# Patient Record
Sex: MALE | Race: WHITE | NOT HISPANIC OR LATINO | Employment: OTHER | URBAN - METROPOLITAN AREA
[De-identification: names, ages, dates, MRNs, and addresses within clinical notes are randomized per-mention and may not be internally consistent; named-entity substitution may affect disease eponyms.]

---

## 2019-01-10 PROBLEM — Z98.890 S/P COLONOSCOPIC POLYPECTOMY: Chronic | Status: ACTIVE | Noted: 2019-01-10

## 2020-02-19 PROBLEM — E11.9 NON-INSULIN DEPENDENT TYPE 2 DIABETES MELLITUS: Chronic | Status: ACTIVE | Noted: 2020-02-19

## 2020-03-03 ENCOUNTER — TELEPHONE (OUTPATIENT)
Dept: DIABETES | Facility: CLINIC | Age: 75
End: 2020-03-03

## 2020-03-03 NOTE — TELEPHONE ENCOUNTER
Spoke with patients wife and informed her that a diabetes referral coordinator should be contacting her to schedule an appointment with Janette Huerta, the DM Educator.

## 2020-03-03 NOTE — TELEPHONE ENCOUNTER
----- Message from Randall Langston sent at 3/2/2020 12:33 PM CST -----  Contact: pt: [phone]  Pt would like to speak with staff to receive information on the diabetes education class      Please contact pt: [phone]

## 2020-11-24 PROBLEM — Z01.818 PREOP TESTING: Status: ACTIVE | Noted: 2020-11-24

## 2020-11-24 PROBLEM — Z85.038 HISTORY OF COLON CANCER: Status: ACTIVE | Noted: 2020-11-24

## 2021-01-09 ENCOUNTER — IMMUNIZATION (OUTPATIENT)
Dept: PRIMARY CARE CLINIC | Facility: CLINIC | Age: 76
End: 2021-01-09
Payer: MEDICARE

## 2021-01-09 DIAGNOSIS — Z23 NEED FOR VACCINATION: ICD-10-CM

## 2021-01-09 PROCEDURE — 91300 COVID-19, MRNA, LNP-S, PF, 30 MCG/0.3 ML DOSE VACCINE: CPT | Mod: PBBFAC | Performed by: FAMILY MEDICINE

## 2021-01-30 ENCOUNTER — IMMUNIZATION (OUTPATIENT)
Dept: PRIMARY CARE CLINIC | Facility: CLINIC | Age: 76
End: 2021-01-30
Payer: MEDICARE

## 2021-01-30 DIAGNOSIS — Z23 NEED FOR VACCINATION: Primary | ICD-10-CM

## 2021-01-30 PROCEDURE — 91300 COVID-19, MRNA, LNP-S, PF, 30 MCG/0.3 ML DOSE VACCINE: CPT | Mod: PBBFAC | Performed by: EMERGENCY MEDICINE

## 2021-01-30 PROCEDURE — 0002A COVID-19, MRNA, LNP-S, PF, 30 MCG/0.3 ML DOSE VACCINE: CPT | Mod: PBBFAC | Performed by: EMERGENCY MEDICINE

## 2021-02-15 PROBLEM — N18.30 STAGE 3 CHRONIC KIDNEY DISEASE: Status: ACTIVE | Noted: 2021-02-15

## 2021-02-22 ENCOUNTER — TELEPHONE (OUTPATIENT)
Dept: PODIATRY | Facility: CLINIC | Age: 76
End: 2021-02-22

## 2021-10-22 ENCOUNTER — IMMUNIZATION (OUTPATIENT)
Dept: PRIMARY CARE CLINIC | Facility: CLINIC | Age: 76
End: 2021-10-22
Payer: MEDICARE

## 2021-10-22 DIAGNOSIS — Z23 NEED FOR VACCINATION: Primary | ICD-10-CM

## 2021-10-22 PROCEDURE — 0003A COVID-19, MRNA, LNP-S, PF, 30 MCG/0.3 ML DOSE VACCINE: CPT | Mod: PBBFAC | Performed by: EMERGENCY MEDICINE

## 2021-10-22 PROCEDURE — 91300 COVID-19, MRNA, LNP-S, PF, 30 MCG/0.3 ML DOSE VACCINE: CPT | Mod: PBBFAC | Performed by: EMERGENCY MEDICINE

## 2022-02-17 ENCOUNTER — TELEPHONE (OUTPATIENT)
Dept: ORTHOPEDICS | Facility: CLINIC | Age: 77
End: 2022-02-17
Payer: MEDICARE

## 2022-02-17 DIAGNOSIS — M79.642 BILATERAL HAND PAIN: Primary | ICD-10-CM

## 2022-02-17 DIAGNOSIS — M79.641 BILATERAL HAND PAIN: Primary | ICD-10-CM

## 2022-02-17 NOTE — TELEPHONE ENCOUNTER
Spoke with pt. Advised pt his appt would need to be rescheduled due to Dr Edward being out of clinic. appt rescheduled with PA. Advised pt that he will need xrays prior to his appt. Images ordered and scheduled. All questions answered. Pt verbalized understanding.

## 2022-02-17 NOTE — TELEPHONE ENCOUNTER
----- Message from Mya Chao sent at 2/17/2022 10:20 AM CST -----  Contact: pt  Pt returning call to [facility].    Confirmed contact below:  Contact Name:Nael Mistry  Phone Number: [phone]

## 2022-02-22 ENCOUNTER — OFFICE VISIT (OUTPATIENT)
Dept: ORTHOPEDICS | Facility: CLINIC | Age: 77
End: 2022-02-22
Payer: MEDICARE

## 2022-02-22 VITALS
SYSTOLIC BLOOD PRESSURE: 144 MMHG | RESPIRATION RATE: 18 BRPM | HEART RATE: 75 BPM | WEIGHT: 251.63 LBS | BODY MASS INDEX: 36.02 KG/M2 | HEIGHT: 70 IN | DIASTOLIC BLOOD PRESSURE: 83 MMHG

## 2022-02-22 DIAGNOSIS — M79.642 BILATERAL HAND PAIN: ICD-10-CM

## 2022-02-22 DIAGNOSIS — R20.0 FINGER NUMBNESS: Primary | ICD-10-CM

## 2022-02-22 DIAGNOSIS — M79.641 BILATERAL HAND PAIN: ICD-10-CM

## 2022-02-22 PROCEDURE — 3008F PR BODY MASS INDEX (BMI) DOCUMENTED: ICD-10-PCS | Mod: CPTII,S$GLB,, | Performed by: PHYSICIAN ASSISTANT

## 2022-02-22 PROCEDURE — 3077F SYST BP >= 140 MM HG: CPT | Mod: CPTII,S$GLB,, | Performed by: PHYSICIAN ASSISTANT

## 2022-02-22 PROCEDURE — 1101F PT FALLS ASSESS-DOCD LE1/YR: CPT | Mod: CPTII,S$GLB,, | Performed by: PHYSICIAN ASSISTANT

## 2022-02-22 PROCEDURE — 1125F AMNT PAIN NOTED PAIN PRSNT: CPT | Mod: CPTII,S$GLB,, | Performed by: PHYSICIAN ASSISTANT

## 2022-02-22 PROCEDURE — 3288F FALL RISK ASSESSMENT DOCD: CPT | Mod: CPTII,S$GLB,, | Performed by: PHYSICIAN ASSISTANT

## 2022-02-22 PROCEDURE — 3044F HG A1C LEVEL LT 7.0%: CPT | Mod: CPTII,S$GLB,, | Performed by: PHYSICIAN ASSISTANT

## 2022-02-22 PROCEDURE — 3079F PR MOST RECENT DIASTOLIC BLOOD PRESSURE 80-89 MM HG: ICD-10-PCS | Mod: CPTII,S$GLB,, | Performed by: PHYSICIAN ASSISTANT

## 2022-02-22 PROCEDURE — 1160F PR REVIEW ALL MEDS BY PRESCRIBER/CLIN PHARMACIST DOCUMENTED: ICD-10-PCS | Mod: CPTII,S$GLB,, | Performed by: PHYSICIAN ASSISTANT

## 2022-02-22 PROCEDURE — 1160F RVW MEDS BY RX/DR IN RCRD: CPT | Mod: CPTII,S$GLB,, | Performed by: PHYSICIAN ASSISTANT

## 2022-02-22 PROCEDURE — 1159F MED LIST DOCD IN RCRD: CPT | Mod: CPTII,S$GLB,, | Performed by: PHYSICIAN ASSISTANT

## 2022-02-22 PROCEDURE — 3044F PR MOST RECENT HEMOGLOBIN A1C LEVEL <7.0%: ICD-10-PCS | Mod: CPTII,S$GLB,, | Performed by: PHYSICIAN ASSISTANT

## 2022-02-22 PROCEDURE — 3288F PR FALLS RISK ASSESSMENT DOCUMENTED: ICD-10-PCS | Mod: CPTII,S$GLB,, | Performed by: PHYSICIAN ASSISTANT

## 2022-02-22 PROCEDURE — 99999 PR PBB SHADOW E&M-EST. PATIENT-LVL V: ICD-10-PCS | Mod: PBBFAC,,, | Performed by: PHYSICIAN ASSISTANT

## 2022-02-22 PROCEDURE — 99203 PR OFFICE/OUTPT VISIT, NEW, LEVL III, 30-44 MIN: ICD-10-PCS | Mod: S$GLB,,, | Performed by: PHYSICIAN ASSISTANT

## 2022-02-22 PROCEDURE — 3008F BODY MASS INDEX DOCD: CPT | Mod: CPTII,S$GLB,, | Performed by: PHYSICIAN ASSISTANT

## 2022-02-22 PROCEDURE — 1159F PR MEDICATION LIST DOCUMENTED IN MEDICAL RECORD: ICD-10-PCS | Mod: CPTII,S$GLB,, | Performed by: PHYSICIAN ASSISTANT

## 2022-02-22 PROCEDURE — 1125F PR PAIN SEVERITY QUANTIFIED, PAIN PRESENT: ICD-10-PCS | Mod: CPTII,S$GLB,, | Performed by: PHYSICIAN ASSISTANT

## 2022-02-22 PROCEDURE — 99999 PR PBB SHADOW E&M-EST. PATIENT-LVL V: CPT | Mod: PBBFAC,,, | Performed by: PHYSICIAN ASSISTANT

## 2022-02-22 PROCEDURE — 1101F PR PT FALLS ASSESS DOC 0-1 FALLS W/OUT INJ PAST YR: ICD-10-PCS | Mod: CPTII,S$GLB,, | Performed by: PHYSICIAN ASSISTANT

## 2022-02-22 PROCEDURE — 99203 OFFICE O/P NEW LOW 30 MIN: CPT | Mod: S$GLB,,, | Performed by: PHYSICIAN ASSISTANT

## 2022-02-22 PROCEDURE — 3079F DIAST BP 80-89 MM HG: CPT | Mod: CPTII,S$GLB,, | Performed by: PHYSICIAN ASSISTANT

## 2022-02-22 PROCEDURE — 3077F PR MOST RECENT SYSTOLIC BLOOD PRESSURE >= 140 MM HG: ICD-10-PCS | Mod: CPTII,S$GLB,, | Performed by: PHYSICIAN ASSISTANT

## 2022-02-22 NOTE — PROGRESS NOTES
Subjective:      Patient ID: Nael Mistry is a 76 y.o. male.    Chief Complaint: Pain and Numbness of the Left Hand and Pain and Numbness of the Right Hand    77 yo RHD M presents to clinic with c/o intermittent bilateral wrist pain and numbness x years.  Patient denies any trauma or injury   Patient states the pain and numbness are worse at night, he is having difficulty sleeping and is waking up in the middle of the night with pain.  Patient is right-handed, and states his right wrist is slightly worse than his left.  States numbness and tingling radiates to the elbow at times.  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  Patient has been taking meloxicam once daily as prescribed by PCP, with minimal improvement in his symptoms.    Pt states that he read an article about carpal tunnel written by Dr. Moralez in a magazine that he received in the mail.  He has similar sx to what was mentioned in the article, so he thinks that he has carpal tunnel.      Review of Systems   Constitutional: Negative for chills and fever.   HENT: Negative for ear pain.    Eyes: Negative for pain.   Cardiovascular: Negative for chest pain.   Respiratory: Negative for cough and shortness of breath.    Skin: Negative for color change and rash.   Musculoskeletal: Positive for joint pain (bilateral wrists). Negative for back pain, joint swelling and neck pain.   Gastrointestinal: Negative for abdominal pain.   Neurological: Positive for numbness (bilateral hands) and paresthesias (bilateral hands).         Objective:            General    Constitutional: He is oriented to person, place, and time. He appears well-developed and well-nourished. No distress.   HENT:   Head: Normocephalic and atraumatic.   Eyes: EOM are normal.   Neck: Neck supple.   Cardiovascular: Normal rate.    Pulmonary/Chest: Effort normal. No respiratory distress.   Neurological: He is alert and oriented to person, place, and time.    Psychiatric: He has a normal mood and affect. His behavior is normal. Judgment and thought content normal.             Right Hand/Wrist Exam     Inspection   Scars: Wrist - absent Hand -  absent  Effusion: Wrist - absent Hand -  absent  Bruising: Wrist - absent Hand -  absent  Deformity: Wrist - deformity Hand -  deformity    Range of Motion     Wrist   Extension: normal   Flexion: normal     Tests   Phalens Sign: negative  Tinel's sign (median nerve): negative  Finkelstein's test: negative  Carpal Tunnel Compression Test: negative  Cubital Tunnel Compression Test: negative      Other     Neuorologic Exam    Median Distribution: normal  Ulnar Distribution: normal  Radial Distribution: normal      Left Hand/Wrist Exam     Inspection   Scars: Wrist - absent Hand -  absent  Effusion: Wrist - absent Hand -  absent  Bruising: Wrist - absent Hand -  absent  Deformity: Wrist - absent Hand -  absent    Range of Motion     Wrist   Extension: normal   Flexion: normal     Tests   Phalens Sign: negative  Tinel's sign (median nerve): negative  Finkelstein's test: negative  Carpal Tunnel Compression Test: negative  Cubital Tunnel Compression Test: negative      Other     Sensory Exam  Median Distribution: normal  Ulnar Distribution: normal  Radial Distribution: normal      Right Elbow Exam     Tests   Tinel's sign (cubital tunnel): negative      Left Elbow Exam     Tests   Tinel's sign (cubital tunnel): negative        Muscle Strength   Right Upper Extremity   Wrist extension: 5/5   Wrist flexion: 5/5   Left Upper Extremity  Wrist extension: 5/5   Wrist flexion: 5/5     Vascular Exam       Capillary Refill  Right Hand: normal capillary refill  Left Hand: normal capillary refill              Assessment:       Encounter Diagnoses   Name Primary?    Bilateral hand pain     Finger numbness Yes          Plan:       Nael was seen today for pain, numbness, pain and numbness.    Diagnoses and all orders for this visit:    Finger  numbness  -     EMG W/ ULTRASOUND AND NERVE CONDUCTION TEST 2 Extremities; Future    Bilateral hand pain  -     Ambulatory referral/consult to Orthopedics  -     EMG W/ ULTRASOUND AND NERVE CONDUCTION TEST 2 Extremities; Future        I made the decision to obtain old records of the patient including previous notes and imaging. New imaging was ordered today of the extremity or extremities evaluated. I independently reviewed and interpreted the radiographs today as well as prior imaging.    The total face-to-face encounter time with this patient was 30 minutes and greater than 50% of of the encounter time was spent counseling the patient, coordinating care, and education regarding the pathology of his/her diagnosis. We have discussed a variety of treatment options including medications, bracing, nerve glide exercises, injections, occupational therapy and surgery. Pt is requesting braces and home exercises at this time. He would also like to proceed with EMG.    1. Continue Mobic as prescribed by PCP as needed.  2. Carpal tunnel nerve glide exercises as demonstrated. [facility] handout of exercises provided to patient.  3. Wrist braces to be worn while sleeping and as needed during the day.  [id number] - Belinda Sage LPN, performed a custom orthotic / brace adjustment, fitting and training with the patient. The patient demonstrated understanding and proper care. This was performed for 10 minutes.  4. Ice compress to the affected area 2-3x a day for 15-20 minutes as needed for pain management.  5. RTC after EMG for follow up, sooner if needed.      Patient voices understanding of and agreement with treatment plan. All of the patient's questions were answered and the patient will contact us if he has any questions or concerns in the interim.

## 2022-04-06 ENCOUNTER — PROCEDURE VISIT (OUTPATIENT)
Dept: PHYSICAL MEDICINE AND REHAB | Facility: CLINIC | Age: 77
End: 2022-04-06
Payer: MEDICARE

## 2022-04-06 DIAGNOSIS — M79.641 BILATERAL HAND PAIN: ICD-10-CM

## 2022-04-06 DIAGNOSIS — M79.642 BILATERAL HAND PAIN: ICD-10-CM

## 2022-04-06 DIAGNOSIS — R20.0 FINGER NUMBNESS: ICD-10-CM

## 2022-04-06 PROCEDURE — 95911 PR NERVE CONDUCTION STUDY; 9-10 STUDIES: ICD-10-PCS | Mod: S$GLB,,, | Performed by: PHYSICAL MEDICINE & REHABILITATION

## 2022-04-06 PROCEDURE — 95885 PR MUSC TST DONE W/NERV TST LIM: ICD-10-PCS | Mod: 59,S$GLB,, | Performed by: PHYSICAL MEDICINE & REHABILITATION

## 2022-04-06 PROCEDURE — 95886 MUSC TEST DONE W/N TEST COMP: CPT | Mod: S$GLB,,, | Performed by: PHYSICAL MEDICINE & REHABILITATION

## 2022-04-06 PROCEDURE — 95885 MUSC TST DONE W/NERV TST LIM: CPT | Mod: 59,S$GLB,, | Performed by: PHYSICAL MEDICINE & REHABILITATION

## 2022-04-06 PROCEDURE — 95911 NRV CNDJ TEST 9-10 STUDIES: CPT | Mod: S$GLB,,, | Performed by: PHYSICAL MEDICINE & REHABILITATION

## 2022-04-06 PROCEDURE — 95886 PR EMG COMPLETE, W/ NERVE CONDUCTION STUDIES, 5+ MUSCLES: ICD-10-PCS | Mod: S$GLB,,, | Performed by: PHYSICAL MEDICINE & REHABILITATION

## 2022-04-06 NOTE — PROCEDURES
Test Date:  2022    Patient: Nael Mistry : 1945 Physician: Enrique Van D.O.   ID#:  SEX: Male Ref. Phys: Rosalva Lozoya PA-C     HPI: Nael Mistry is a 76 y.o.male who presents for NCS/EMG to evaluate CTS bilaterally.      NCV & EMG Findings:   Evaluation of the left median motor and the right median motor nerves showed prolonged distal onset latency.   The left median sensory and the right median sensory nerves showed no response.   All remaining nerves (as indicated in the following tables) were within normal limits.   Needle evaluation of the left Triceps Brachii muscle showed diminished recruitment.   All remaining muscles (as indicated in the following table) showed no evidence of electrical instability.    Impression:  1.  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 Carpal tunnel syndrome).  There is no motor axonal loss.  There is no active denervation.  This is graded as Severe in severity bilaterally.    2. On the left, the median nerve response was significantly smaller at the elbow (95% smaller than the more distal wrist response).  There is no evidence of an anomalous innervation to explain this.  Needle EMG showed normal proximal median muscles.  I suspect this to be technical, but a second median mononeuropathy in the forearm on the left cannot be excluded either.     3. The left triceps muscle showed reduced recruitment.  In isolation to one muscle, this is of limited diagnostic significance.    ___________________________  Enrique Van D.O.        NCS+  Motor Nerve Results      Latency Amplitude F-Lat Segment Distance CV Comment   Site (ms) Norm (mV) Norm (ms)  (cm) (m/s) Norm    Left Median (APB)   Palm 1.75 - 7.6 -         Wrist *6.9  < 4.7 4.1  > 3.8  Wrist-Palm 8 15 -    Wrist (U) 3.0 - 2.4 -         Elbow 10.3 - 0.20 -  Elbow-Wrist 23 68  > 47    Elbow (U) 7.3 - 2.4 -  Elbow (U)-Wrist  (U) - - -    Right Median (APB)   Wrist *7.5  < 4.7 4.6  > 3.8  Wrist-Palm - - -    Elbow 12.3 - 4.1 -  Elbow-Wrist 24 50  > 47    Left Ulnar (ADM)   Wrist 3.2  < 3.7 9.8  > 3.0         Bel Elbow 7.6 - 6.4 -  Bel Elbow-Wrist 25 57  > 52    Abv Elbow 9.0 - 7.6 -  Abv Elbow-Bel Elbow 11 79  > 43    Right Ulnar (ADM)   Wrist 3.5  < 3.7 9.1  > 3.0         Bel Elbow 7.8 - 6.2 -  Bel Elbow-Wrist 24 56  > 52    Abv Elbow 9.5 - 5.9 -  Abv Elbow-Bel Elbow 11 65  > 43      Sensory Nerve Results      Latency (Peak) Amplitude (P-P) Segment Distance CV Comment   Site (ms) Norm (µV) Norm  (cm) (m/s) Norm    Left Median   Wrist-Dig II *NR  < 4.0 *NR  > 8 Wrist-Dig II 14 *NR  > 39    Right Median   Wrist-Dig II *NR  < 4.0 *NR  > 8 Wrist-Dig II 14 *NR  > 39    Left Ulnar   Wrist-Dig V 3.6  < 4.0 30  > 4 Wrist-Dig V 14 39  > 38    Right Ulnar   Wrist-Dig V 3.5  < 4.0 22  > 4 Wrist-Dig V 14 40  > 38    Right Radial   Forearm-Wrist 2.2  < 2.8 19  > 11 Forearm-Wrist 10 45 -      EMG+     Side Muscle Nerve Root Ins Act Fibs Psw Amp Dur Poly Recrt Int Pat Comment   Left Deltoid Axillary C5-C6 Nml Nml Nml Nml Nml 0 Nml Nml    Left FCR Median C6-C7 Nml Nml Nml Nml Nml 0 Nml Nml    Left Pronator Teres Median C6-C7 Nml Nml Nml Nml Nml 0 Nml Nml    Left Triceps Radial C6-C8 Nml Nml Nml Nml Nml 0 *Reduced Nml 1 unit at 20 hz   Left EDC Radial,  Posterior In... C7-C8 Nml Nml Nml Nml Nml 0 Nml Nml    Left Cervical Parasp (Lower) Rami C7-C8 Nml Nml Nml         Left APB Median C8-T1 Nml Nml Nml Nml Nml 0 Nml Nml    Right APB Median C8-T1 Nml Nml Nml Nml Nml 0 Nml Nml            Waveforms:    Motor              Sensory

## 2022-04-13 ENCOUNTER — OFFICE VISIT (OUTPATIENT)
Dept: ORTHOPEDICS | Facility: CLINIC | Age: 77
End: 2022-04-13
Payer: MEDICARE

## 2022-04-13 VITALS
HEART RATE: 85 BPM | SYSTOLIC BLOOD PRESSURE: 146 MMHG | BODY MASS INDEX: 35.63 KG/M2 | DIASTOLIC BLOOD PRESSURE: 74 MMHG | WEIGHT: 248.88 LBS | HEIGHT: 70 IN

## 2022-04-13 DIAGNOSIS — G56.03 CARPAL TUNNEL SYNDROME, BILATERAL: Primary | ICD-10-CM

## 2022-04-13 PROCEDURE — 99999 PR PBB SHADOW E&M-EST. PATIENT-LVL III: ICD-10-PCS | Mod: PBBFAC,,, | Performed by: ORTHOPAEDIC SURGERY

## 2022-04-13 PROCEDURE — 99213 OFFICE O/P EST LOW 20 MIN: CPT | Mod: PBBFAC,PN | Performed by: ORTHOPAEDIC SURGERY

## 2022-04-13 PROCEDURE — 99999 PR PBB SHADOW E&M-EST. PATIENT-LVL III: CPT | Mod: PBBFAC,,, | Performed by: ORTHOPAEDIC SURGERY

## 2022-04-13 PROCEDURE — 99213 OFFICE O/P EST LOW 20 MIN: CPT | Mod: S$GLB,,, | Performed by: ORTHOPAEDIC SURGERY

## 2022-04-13 PROCEDURE — 99213 PR OFFICE/OUTPT VISIT, EST, LEVL III, 20-29 MIN: ICD-10-PCS | Mod: S$GLB,,, | Performed by: ORTHOPAEDIC SURGERY

## 2022-04-13 NOTE — PROGRESS NOTES
"Subjective:    Patient ID:  Nael Mistry is a 76 y.o. y.o. male who presents for f/u visit for Results of the Left Hand and Results of the Right Hand      Patient returns to review recent BUE EMG/NCV study results. He eas seen previously by CARLY Mcfarland and his history is well-documented in the 2/22/2022 office note. Notes symptom improvement with nocturnal bracing.         Objective:     BP (!) 146/74 (BP Location: Left arm, Patient Position: Sitting, BP Method: Large (Automatic))   Pulse 85   Ht 5' 10" (1.778 m)   Wt 112.9 kg (248 lb 14.4 oz)   BMI 35.71 kg/m²     Ortho Exam     77 yo male in NAD; alert, oriented x 3    Head: atraumatic  Eyes: EOM are normal. Right eye exhibits no discharge. Left eye exhibits no discharge  Cardiovascular: normal rate    Pulmonary/Chest: effort normal; no respiratory distress  Abdominal: soft    Bilateral hand/wrist: N/V intact; no swelling, gross motor atrophy or focal tenderness; FROM; positive Durkan's        Assessment & Plan:     1. Carpal tunnel syndrome, bilateral      1.  BUE EMG/NCV study results reviewed. Treatment options discussed. Patient elects to continue non-operative management at this time.  2.  Follow-up prn  "

## 2022-04-20 ENCOUNTER — OFFICE VISIT (OUTPATIENT)
Dept: PRIMARY CARE CLINIC | Facility: CLINIC | Age: 77
End: 2022-04-20
Payer: MEDICARE

## 2022-04-20 VITALS
DIASTOLIC BLOOD PRESSURE: 80 MMHG | OXYGEN SATURATION: 99 % | RESPIRATION RATE: 18 BRPM | WEIGHT: 248.56 LBS | SYSTOLIC BLOOD PRESSURE: 124 MMHG | BODY MASS INDEX: 35.58 KG/M2 | HEART RATE: 79 BPM | HEIGHT: 70 IN

## 2022-04-20 DIAGNOSIS — N18.31 STAGE 3A CHRONIC KIDNEY DISEASE: ICD-10-CM

## 2022-04-20 DIAGNOSIS — E78.5 TYPE 2 DIABETES MELLITUS WITH HYPERLIPIDEMIA: Primary | ICD-10-CM

## 2022-04-20 DIAGNOSIS — Z23 NEED FOR VACCINATION: ICD-10-CM

## 2022-04-20 DIAGNOSIS — Z85.038 HISTORY OF COLON CANCER: ICD-10-CM

## 2022-04-20 DIAGNOSIS — E11.69 TYPE 2 DIABETES MELLITUS WITH HYPERLIPIDEMIA: Primary | ICD-10-CM

## 2022-04-20 DIAGNOSIS — E78.1 HYPERTRIGLYCERIDEMIA: ICD-10-CM

## 2022-04-20 DIAGNOSIS — E03.8 SUBCLINICAL HYPOTHYROIDISM: ICD-10-CM

## 2022-04-20 DIAGNOSIS — Z85.51 HISTORY OF BLADDER CANCER: ICD-10-CM

## 2022-04-20 DIAGNOSIS — E55.9 VITAMIN D DEFICIENCY: ICD-10-CM

## 2022-04-20 PROBLEM — R79.89 ELEVATED TSH: Status: ACTIVE | Noted: 2022-04-20

## 2022-04-20 PROCEDURE — 3079F PR MOST RECENT DIASTOLIC BLOOD PRESSURE 80-89 MM HG: ICD-10-PCS | Mod: CPTII,S$GLB,, | Performed by: FAMILY MEDICINE

## 2022-04-20 PROCEDURE — 99214 OFFICE O/P EST MOD 30 MIN: CPT | Mod: S$GLB,,, | Performed by: FAMILY MEDICINE

## 2022-04-20 PROCEDURE — 1126F AMNT PAIN NOTED NONE PRSNT: CPT | Mod: CPTII,S$GLB,, | Performed by: FAMILY MEDICINE

## 2022-04-20 PROCEDURE — 1160F PR REVIEW ALL MEDS BY PRESCRIBER/CLIN PHARMACIST DOCUMENTED: ICD-10-PCS | Mod: CPTII,S$GLB,, | Performed by: FAMILY MEDICINE

## 2022-04-20 PROCEDURE — 3044F PR MOST RECENT HEMOGLOBIN A1C LEVEL <7.0%: ICD-10-PCS | Mod: CPTII,S$GLB,, | Performed by: FAMILY MEDICINE

## 2022-04-20 PROCEDURE — 99999 PR PBB SHADOW E&M-EST. PATIENT-LVL III: CPT | Mod: PBBFAC,,, | Performed by: FAMILY MEDICINE

## 2022-04-20 PROCEDURE — 3008F PR BODY MASS INDEX (BMI) DOCUMENTED: ICD-10-PCS | Mod: CPTII,S$GLB,, | Performed by: FAMILY MEDICINE

## 2022-04-20 PROCEDURE — 99999 PR PBB SHADOW E&M-EST. PATIENT-LVL III: ICD-10-PCS | Mod: PBBFAC,,, | Performed by: FAMILY MEDICINE

## 2022-04-20 PROCEDURE — 1160F RVW MEDS BY RX/DR IN RCRD: CPT | Mod: CPTII,S$GLB,, | Performed by: FAMILY MEDICINE

## 2022-04-20 PROCEDURE — 3074F SYST BP LT 130 MM HG: CPT | Mod: CPTII,S$GLB,, | Performed by: FAMILY MEDICINE

## 2022-04-20 PROCEDURE — 3008F BODY MASS INDEX DOCD: CPT | Mod: CPTII,S$GLB,, | Performed by: FAMILY MEDICINE

## 2022-04-20 PROCEDURE — 3288F FALL RISK ASSESSMENT DOCD: CPT | Mod: CPTII,S$GLB,, | Performed by: FAMILY MEDICINE

## 2022-04-20 PROCEDURE — 1101F PR PT FALLS ASSESS DOC 0-1 FALLS W/OUT INJ PAST YR: ICD-10-PCS | Mod: CPTII,S$GLB,, | Performed by: FAMILY MEDICINE

## 2022-04-20 PROCEDURE — 3074F PR MOST RECENT SYSTOLIC BLOOD PRESSURE < 130 MM HG: ICD-10-PCS | Mod: CPTII,S$GLB,, | Performed by: FAMILY MEDICINE

## 2022-04-20 PROCEDURE — 1101F PT FALLS ASSESS-DOCD LE1/YR: CPT | Mod: CPTII,S$GLB,, | Performed by: FAMILY MEDICINE

## 2022-04-20 PROCEDURE — 1159F MED LIST DOCD IN RCRD: CPT | Mod: CPTII,S$GLB,, | Performed by: FAMILY MEDICINE

## 2022-04-20 PROCEDURE — 1159F PR MEDICATION LIST DOCUMENTED IN MEDICAL RECORD: ICD-10-PCS | Mod: CPTII,S$GLB,, | Performed by: FAMILY MEDICINE

## 2022-04-20 PROCEDURE — 99214 PR OFFICE/OUTPT VISIT, EST, LEVL IV, 30-39 MIN: ICD-10-PCS | Mod: S$GLB,,, | Performed by: FAMILY MEDICINE

## 2022-04-20 PROCEDURE — 3288F PR FALLS RISK ASSESSMENT DOCUMENTED: ICD-10-PCS | Mod: CPTII,S$GLB,, | Performed by: FAMILY MEDICINE

## 2022-04-20 PROCEDURE — 3079F DIAST BP 80-89 MM HG: CPT | Mod: CPTII,S$GLB,, | Performed by: FAMILY MEDICINE

## 2022-04-20 PROCEDURE — 1126F PR PAIN SEVERITY QUANTIFIED, NO PAIN PRESENT: ICD-10-PCS | Mod: CPTII,S$GLB,, | Performed by: FAMILY MEDICINE

## 2022-04-20 PROCEDURE — 3044F HG A1C LEVEL LT 7.0%: CPT | Mod: CPTII,S$GLB,, | Performed by: FAMILY MEDICINE

## 2022-04-20 RX ORDER — ZOSTER VACCINE RECOMBINANT, ADJUVANTED 50 MCG/0.5
0.5 KIT INTRAMUSCULAR ONCE
Qty: 1 EACH | Refills: 1 | Status: SHIPPED | OUTPATIENT
Start: 2022-04-20 | End: 2022-04-20

## 2022-04-20 NOTE — PROGRESS NOTES
"Subjective:       Patient ID: Nael Mistry is a 76 y.o. male.    Chief Complaint: Establish Care and Results    Here to establish care.  Recent labs confirm diagnosis of new onset type 2 diabetes.  TSH also minimally elevated.  Has never been on any oral hypoglycemic, and would like to avoid medication if at all possible.  Has never had formal diabetes education.  Admits that he eats a moderate amount of carbohydrates in his diet.    Review of Systems   Constitutional: Negative for chills, fatigue and fever.   HENT: Negative for congestion.    Eyes: Negative for visual disturbance.   Respiratory: Negative for cough and shortness of breath.    Cardiovascular: Negative for chest pain.   Gastrointestinal: Negative for abdominal pain, nausea and vomiting.   Endocrine: Negative for polydipsia and polyuria.   Genitourinary: Negative for difficulty urinating.   Musculoskeletal: Negative for arthralgias.   Skin: Negative for rash.   Allergic/Immunologic: Negative for immunocompromised state.   Neurological: Negative for dizziness.   Psychiatric/Behavioral: Negative for sleep disturbance.       Objective:      Vitals:    04/20/22 0917   BP: 124/80   BP Location: Right arm   Patient Position: Sitting   BP Method: Large (Manual)   Pulse: 79   Resp: 18   SpO2: 99%   Weight: 112.8 kg (248 lb 9.1 oz)   Height: 5' 10" (1.778 m)     Physical Exam  Vitals and nursing note reviewed.   Constitutional:       Appearance: He is well-developed.   HENT:      Head: Normocephalic and atraumatic.   Neck:      Vascular: No carotid bruit.   Cardiovascular:      Rate and Rhythm: Normal rate and regular rhythm.      Pulses:           Dorsalis pedis pulses are 1+ on the right side and 1+ on the left side.      Heart sounds: Normal heart sounds.   Pulmonary:      Effort: Pulmonary effort is normal.      Breath sounds: Normal breath sounds.   Feet:      Right foot:      Protective Sensation: 10 sites tested. 10 sites sensed.      Skin " integrity: Skin integrity normal.      Left foot:      Protective Sensation: 10 sites tested. 10 sites sensed.      Skin integrity: Skin integrity normal.   Skin:     General: Skin is warm and dry.   Neurological:      Mental Status: He is alert and oriented to person, place, and time.         Lab Results   Component Value Date    WBC 6.3 02/08/2022    HGB 12.9 (L) 02/08/2022    HCT 38.3 (L) 02/08/2022     02/08/2022    CHOL 162 02/08/2022    TRIG 178 (H) 02/08/2022    HDL 49 02/08/2022    ALT 30 02/08/2022    AST 23 02/08/2022     02/08/2022    K 4.6 02/08/2022     02/08/2022    CREATININE 1.23 (H) 02/08/2022    BUN 16 02/08/2022    CO2 27 02/08/2022    TSH 8.36 (H) 02/08/2022    PSA 1.6 02/01/2019    HGBA1C 6.8 (H) 02/08/2022    MICROALBUR 0.3 08/09/2021      Assessment:       1. Type 2 diabetes mellitus with hyperlipidemia    2. Hypertriglyceridemia    3. Stage 3a chronic kidney disease    4. Vitamin D deficiency    5. Subclinical hypothyroidism    6. History of bladder cancer    7. History of colon cancer    8. Need for vaccination        Plan:       Type 2 diabetes mellitus with hyperlipidemia  -     Foot Exam Performed  -     Ambulatory referral/consult to Diabetes Education; Future; Expected date: 04/27/2022  Patient would like to try to control with diet, avoid medications.  Needs to meet with diabetes educator.  Reduce carbohydrate intake.  Will need repeat A1c in 3 months  Hypertriglyceridemia  Stable on Vytorin  Stage 3a chronic kidney disease  Avoid nephrotoxins  Vitamin D deficiency  -     PTH, intact; Future; Expected date: 04/20/2022    Subclinical hypothyroidism  -     TSH; Future; Expected date: 04/20/2022  -     T4, Free; Future; Expected date: 04/20/2022  -     T3; Future; Expected date: 04/20/2022  Full TFTs today  History of bladder cancer  Has been released by Urology  History of colon cancer  Needs colonoscopy every 5 years  Need for vaccination  -     varicella-zoster  [id number]-[id number], PF, (SHINGRIX, PF,) 50 mcg/0.5 mL injection; Inject 0.5 mLs into the muscle once. for 1 dose  Dispense: 1 each; Refill: 1      Medication List with Changes/Refills   New Medications    VARICELLA-ZOSTER GE-AS01B, PF, (SHINGRIX, PF,) 50 MCG/0.5 ML INJECTION    Inject 0.5 mLs into the muscle once. for 1 dose   Current Medications    ASPIRIN (ECOTRIN) 81 MG EC TABLET    Take 1 tablet (81 mg total) by mouth once daily.    EZETIMIBE-SIMVASTATIN 10-40 MG (VYTORIN) 10-40 MG PER TABLET    Take 1 tablet by mouth every evening.

## 2022-04-21 LAB
PTH-INTACT SERPL-MCNC: 35 PG/ML (ref 16–77)
T3 SERPL-MCNC: 102 NG/DL (ref 76–181)
T4 FREE SERPL-MCNC: 0.9 NG/DL (ref 0.8–1.8)
TSH SERPL-ACNC: 3.82 MIU/L (ref 0.4–4.5)

## 2022-05-02 ENCOUNTER — CLINICAL SUPPORT (OUTPATIENT)
Dept: DIABETES | Facility: CLINIC | Age: 77
End: 2022-05-02
Payer: MEDICARE

## 2022-05-02 DIAGNOSIS — E11.69 TYPE 2 DIABETES MELLITUS WITH HYPERLIPIDEMIA: ICD-10-CM

## 2022-05-02 DIAGNOSIS — E78.5 TYPE 2 DIABETES MELLITUS WITH HYPERLIPIDEMIA: ICD-10-CM

## 2022-05-02 PROCEDURE — G0108 DIAB MANAGE TRN  PER INDIV: HCPCS | Mod: S$GLB,,, | Performed by: DIETITIAN, REGISTERED

## 2022-05-02 PROCEDURE — G0108 PR DIAB MANAGE TRN  PER INDIV: ICD-10-PCS | Mod: S$GLB,,, | Performed by: DIETITIAN, REGISTERED

## 2022-05-02 PROCEDURE — 99999 PR PBB SHADOW E&M-EST. PATIENT-LVL III: ICD-10-PCS | Mod: PBBFAC,,, | Performed by: DIETITIAN, REGISTERED

## 2022-05-02 PROCEDURE — 99999 PR PBB SHADOW E&M-EST. PATIENT-LVL III: CPT | Mod: PBBFAC,,, | Performed by: DIETITIAN, REGISTERED

## 2022-05-03 NOTE — PROGRESS NOTES
Diabetes Care Specialist Progress Note  Author: Janette Huerta RD, CDE  Date: 5/3/2022    Program Intake  Reason for Diabetes Program Visit:: Initial Diabetes Assessment  Current diabetes risk level:: low (HgbA1c 6.8 - new diagnosis)  In the last 12 months, have you:: none  Permission to speak with others about care:: no    Lab Results   Component Value Date    LABA1C 5.7 (H) 01/31/2018    HGBA1C 6.8 (H) 02/08/2022       Clinical    Patient Health Rating  Compared to other people your age, how would you rate your health?: Good    Problem Review  Reviewed Problem List with Patient: yes  Active comorbidities affecting diabetes self-care.: yes  Comorbidities: Chronic Kidney Disease, Cancer, Cardiovascular Disease  Reviewed health maintenance: yes    Clinical Assessment  Current Diabetes Treatment: Diet, Exercise  Have you ever experienced hypoglycemia (low blood sugar)?: no  Have you ever experienced hyperglycemia (high blood sugar)?: no    Medication Information  How do you obtain your medications?: Patient drives  How many days a week do you miss your medications?: Never  Do you use a pill box or medication chart to help you manage your medications?: No  Do you sometimes have difficulty refilling your medications?: No  Medication adherence impacting ability to self-manage diabetes?: No    Labs  Do you have regular lab work to monitor your medications?: Yes  Type of Regular Lab Work: A1c, Cholesterol, Microalbumin, CBC, BMP  Where do you get your labs drawn?: [facility]  Lab Compliance Barriers: No    Nutritional Status  Diet: Regular  Meal Plan 24 Hour Recall: Breakfast, Lunch, Dinner, Snack  Meal Plan 24 Hour Recall - Breakfast: 1/2 serving of grits, coffee with 1 tsp sugar  Meal Plan 24 Hour Recall - Lunch: 1/2 serving of grits  Meal Plan 24 Hour Recall - Dinner: Red beans, no rice or chicken breast with sweet potato butter, 1 tsp sugar and cinnamon  Meal Plan 24 Hour Recall - Snack: chocolate, beverage: crystal  light, water, and coffee with sugar  Change in appetite?: Yes  Dentation:: Intact  Recent Changes in Weight: Weight Loss  Was weight loss intentional or unintentional?: Intentional  Current nutritional status an area of need that is impacting patient's ability to self-manage diabetes?: Yes    Additional Social History    Support  Does anyone support you with your diabetes care?: yes  Who supports you?: spouse, self  Who takes you to your medical appointments?: self  Does the current support meet the patient's needs?: Yes  Is Support an area impacting ability to self-manage diabetes?: No    Access to Mass Media & Technology  Does the patient have access to any of the following devices or technologies?: Smart phone  Media or technology needs impacting ability to self-manage diabetes?: No    Cognitive/Behavioral Health  Alert and Oriented: Yes  Difficulty Thinking: No  Requires Prompting: No  Requires assistance for routine expression?: No  Cognitive or behavioral barriers impacting ability to self-manage diabetes?: No    Culture/Scientologist  Culture or Synagogue beliefs that may impact ability to access healthcare: No    Communication  Language preference: English  Hearing Problems: Yes  Vision Problems: Yes  Vision problem type:: Decreased Vision  Vision Assistance: Glasses  Communication needs impacting ability to self-manage diabetes?: No    Health Literacy  Preferred Learning Method: Face to Face, Reading Materials  How often do you need to have someone help you read instructions, pamphlets, or written material from your doctor or pharmacy?: Never  Health literacy needs impacting ability to self-manage diabetes?: No      Diabetes Self-Management Skills Assessment    Diabetes Disease Process/Treatment Options  Patient/caregiver able to state what happens when someone has diabetes.: no  Patient/caregiver knows what type of diabetes they have.: no  Patient/caregiver able to identify at least three signs and symptoms of  diabetes.: no  Patient able to identify at least three risk factors for diabetes.: no  Diabetes Disease Process/Treatment Options: Skills Assessment Completed: Yes  Assessment indicates:: Instruction Needed  Area of need?: Yes    Nutrition/Healthy Eating  Challenges to healthy eating:: snacking between meals and at night, portion control (has improved)  Method of carbohydrate measurement:: measuring cups/spoons, plate method  Patient can identify foods that impact blood sugar.: yes  Patient-identified foods:: sweets, starches (bread, pasta, rice, cereal)  Nutrition/Healthy Eating Skills Assessment Completed:: Yes  Assessment indicates:: Instruction Needed  Area of need?: Yes    Physical Activity/Exercise  Patient's daily activity level:: moderately active  Patient formally exercises outside of work.: yes  Exercise Type: walking  Intensity: Moderate  Frequency: four or more times a week  Duration: 45 min  Patient can identify forms of physical activity.: yes  Stated forms of physical activity:: manual activity at work, yardwork, moving to burn calories  Patient can identify reasons why exercise/physical activity is important in diabetes management.: yes  Identified reasons:: strengthens heart, muscles, and bones, tones muscles, lowers risk of heart disease and stroke, lowers blood glucose, blood pressure, and cholesterol  Physical Activity/Exercise Skills Assessment Completed: : Yes  Assessment indicates:: Adequate understanding  Area of need?: No    Medications  Medication Skills Assessment Completed:: No  Deffered due to:: Other (comment) (patient currently diet and exercise controlled)  Area of need?: No    Home Blood Glucose Monitoring  Patient states that blood sugar is checked at home daily.: no  Reasons for not monitoring:: new diabetes diagnosis  Home Blood Glucose Monitoring Skills Assessment Completed: : Yes  Assessment indicates:: Instruction Needed  Area of need?: Yes    Acute Complications  Patient is  able to identify types of acute complications: No  Acute Complications Skills Assessment Completed: : Yes  Assessment indicates:: Instruction Needed  Area of need?: Yes    Chronic Complications  Patient can identify major chronic complications of diabetes.: no  Patient can identify ways to prevent or delay diabetes complications.: no  Patient is aware that having diabetes increases risk of heart disease?: No  Patient is aware that heart disease is the leading cause of death and disability in people with diabetes?: No  Patient able to state risk factors for heart disease?: Yes  Patient stated risk factors for heart disease:: High blood pressure, High cholesterol  Patient is taking statin?: Yes  Chronic Complications Skills Assessment Completed: : Yes  Assessment indicates:: Instruction Needed  Area of need?: Yes    Psychosocial/Coping  Patient can identify ways of coping with chronic disease.: yes  Patient-stated ways of coping with chronic disease:: support from loved ones  Psychosocial/Coping Skills Assessment Completed: : Yes  Assessment indicates:: Adequate understanding  Area of need?: No      Diabetes Self Support Plan         Assessment Summary and Plan    Based on today's diabetes care assessment, the following areas of need were identified:      Social 5/2/2022   Support No   Access to Mass Media/Tech No   Cognitive/Behavioral Health No   Culture/Anabaptism No   Communication No   Health Literacy No        Clinical 5/2/2022   Medication Adherence No   Lab Compliance No   Nutritional Status Yes        Diabetes Self-Management Skills 5/2/2022   Diabetes Disease Process/Treatment Options Yes   Nutrition/Healthy Eating Yes   Physical Activity/Exercise No   Medication No   Home Blood Glucose Monitoring Yes   Acute Complications Yes   Chronic Complications Yes   Psychosocial/Coping No          Today's interventions were provided through individual discussion, instruction, and written materials were provided.       Patient verbalized understanding of instruction and written materials.  Pt was able to return back demonstration of instructions today. Patient understood key points, needs reinforcement and further instruction.     Diabetes Self-Management Care Plan:    Today's Diabetes Self-Management Care Plan was developed with Nael's input. Nael has agreed to work toward the following goal(s) to improve his/her overall diabetes control.      Care Plan: Diabetes Management   Updates made since 4/3/2022 12:00 AM      Problem: Healthy Eating       Goal: Eat 3 meals daily with 30-60g/2-4 servings of Carbohydrate per meal.    Start Date: 5/2/2022   Expected End Date: 8/2/2022   This Visit's Progress: Deferred   Priority: High   Barriers: No Barriers Identified   Note:    Reviewed carb counting, portion control, importance of spacing meals throughout the day to prevent post prandial elevations.  Recommended low saturated fat, low sodium diet to aid in control of hypertension and cholesterol. Reviewed plate method and portion control, dining out tips, meal planning, reading a food label, healthy snack options, benefits of physical activity       Task: Reviewed the sources and role of Carbohydrate, Protein, and Fat and how each nutrient impacts blood sugar. Completed 5/3/2022      Task: Provided visual examples using dry measuring cups, food models, and other familiar objects such as computer mouse, deck or cards, tennis ball etc. to help with visualization of portions. Completed 5/3/2022      Task: Explained how to count carbohydrates using the food label and the use of dry measuring cups for accurate carb counting. Completed 5/3/2022      Task: Discussed strategies for choosing healthier menu options when dining out. Completed 5/3/2022      Task: Recommended replacing beverages containing high sugar content with noncaloric/sugar free options and/or water. Completed 5/3/2022      Task: Review the importance of balancing  carbohydrates with each meal using portion control techniques to count servings of carbohydrate and label reading to identify serving size and amount of total carbs per serving. Completed 5/3/2022      Task: Provided Sample plate method and reviewed the use of the plate to estimate amounts of carbohydrate per meal. Completed 5/3/2022      Problem: Acute Complications       Goal: Patient agrees to identify and manage signs and symptoms of high/low blood sugar (hyper/hypoglycemia) by keeping a log of events and using proper treatment.    Start Date: 5/2/2022   Expected End Date: 5/2/2023   This Visit's Progress: Deferred   Priority: Medium   Barriers: Lack of Supplies   Note:    Hyperglycemia  ABC's of Diabetes    Discussed importance of A1c less than 6.0 to reduce risk of micro and macro complications, controlled Blood Pressure 130/80 and Cholesterol Lab Values--Total Cholesterol <200mg, LDL < 100, HDL >45 men and >50 women, Triglycerides <150mg for prevention heart disease, heart attack, stroke.   how to use a glucometer   reviewed understanding diabetes distress   reviewed current level and goal level for HgbA1c, blood glucose, microalbumin, and lipids   reviewed signs/symptoms of hyperglycemia   reviewed what level blood sugar is considered high    reviewed at what level to contact the doctor and/or go to the emergency room.    Reviewed what patient can do to decrease blood sugar (ie drink more water, exercise, take medication as prescribed, monitor blood glucose)     Hypoglycemia  Reviewed blood glucose goals, prevention, detection, and treatment of hypoglycemia, and when to contact the clinic.   reviewed signs/symptoms of hypoglycemia   reviewed what level blood sugar is considered low    reviewed at what level to contact the doctor and/or go to the emergency room.    Reviewed what patient can do to increase blood sugar (ie drink juice or ½ can soda, eat 5-6 pieces of candy, 4 glucose tablets, 1 tbsp  sugar or honey, glucagon)   Reviewed when glucagon should be used   Reviewed how to use glucagon device (injections and inhaled)       Task: Reviewed proper treatment of hypoglycemia with the rule of 15--patient to eat 15g simple carbohydrate (4 glucose tablets, 1 glucose gel, 5 pieces hard candy, ½ cup fruit juice, ½ can regular soda, etc) and wait 15 minutes and recheck home glucose.       Task: Reviewed common causes and precautions to help prevent hyper/hypoglycemic events. Completed 5/3/2022      Task: Reviewed signs and symptoms of hyper/hypoglycemia, what range is considered to be hyper/hypoglycemia, and when to seek further medical attention. Completed 5/3/2022      Task: Discussed, sick day planning, natural disaster planning, and/or travel planning to prevent hyper/hypoglycemia.       Task: Discussed risk factors for developing diabetic ketoacidosis (DKA), strategies for reducing risk, testing with ketone test strips if BG is >240mg/dl, basic protocol for managing DKA, and when to seek further medical attention.           Follow Up Plan     Follow up in about 4 weeks (around 5/30/2022) for General Follow-up, Blood Sugar Log Review and F/U MNT.    Today's care plan and follow up schedule was discussed with patient.  Nael verbalized understanding of the care plan, goals, and agrees to follow up plan.        The patient was encouraged to communicate with his/her health care provider/physician and care team regarding his/her condition(s) and treatment.  I provided the patient with my contact information today and encouraged to contact me via phone or [facility] Patient Portal as needed.     Length of Visit   Total Time: 60 Minutes

## 2022-05-24 DIAGNOSIS — E78.5 TYPE 2 DIABETES MELLITUS WITH HYPERLIPIDEMIA: Primary | ICD-10-CM

## 2022-05-24 DIAGNOSIS — E11.69 TYPE 2 DIABETES MELLITUS WITH HYPERLIPIDEMIA: Primary | ICD-10-CM

## 2022-05-24 RX ORDER — LANCETS 33 GAUGE
1 EACH MISCELLANEOUS 2 TIMES DAILY
Qty: 100 EACH | Refills: 11 | Status: SHIPPED | OUTPATIENT
Start: 2022-05-24 | End: 2022-06-07 | Stop reason: SDUPTHER

## 2022-05-24 RX ORDER — BLOOD-GLUCOSE METER
1 EACH MISCELLANEOUS 2 TIMES DAILY
Qty: 100 STRIP | Refills: 11 | Status: SHIPPED | OUTPATIENT
Start: 2022-05-24 | End: 2022-06-07 | Stop reason: SDUPTHER

## 2022-05-30 ENCOUNTER — TELEPHONE (OUTPATIENT)
Dept: ORTHOPEDICS | Facility: CLINIC | Age: 77
End: 2022-05-30
Payer: MEDICARE

## 2022-05-30 NOTE — TELEPHONE ENCOUNTER
Spoke to Pt @ 1:48pm to make him a sooner appointment. Appointment is scheduled for Jessa 10 th @ 8:30am

## 2022-06-03 ENCOUNTER — TELEPHONE (OUTPATIENT)
Dept: ORTHOPEDICS | Facility: CLINIC | Age: 77
End: 2022-06-03
Payer: MEDICARE

## 2022-06-03 NOTE — TELEPHONE ENCOUNTER
CALL TO RESCHEDULE PT TIME FOR HIS APPT ON June 21 WITH DR. AYERS DIDN'T GET AN ANSWER LVM FOR PT TO RETURN MY CALL.

## 2022-06-07 ENCOUNTER — PATIENT MESSAGE (OUTPATIENT)
Dept: PRIMARY CARE CLINIC | Facility: CLINIC | Age: 77
End: 2022-06-07
Payer: MEDICARE

## 2022-06-07 DIAGNOSIS — E78.5 TYPE 2 DIABETES MELLITUS WITH HYPERLIPIDEMIA: ICD-10-CM

## 2022-06-07 DIAGNOSIS — E11.69 TYPE 2 DIABETES MELLITUS WITH HYPERLIPIDEMIA: ICD-10-CM

## 2022-06-07 RX ORDER — LANCETS 33 GAUGE
1 EACH MISCELLANEOUS 2 TIMES DAILY
Qty: 100 EACH | Refills: 11 | Status: SHIPPED | OUTPATIENT
Start: 2022-06-07

## 2022-06-07 RX ORDER — BLOOD-GLUCOSE METER
1 EACH MISCELLANEOUS 2 TIMES DAILY
Qty: 100 STRIP | Refills: 11 | Status: SHIPPED | OUTPATIENT
Start: 2022-06-07

## 2022-06-07 NOTE — TELEPHONE ENCOUNTER
No new care gaps identified.  [facility] Embedded Care Gaps. Reference number: [id number]. 6/07/2022   1:45:15 PM CDT

## 2022-06-07 NOTE — TELEPHONE ENCOUNTER
[facility] doesn't bill under medicare part B. Patients insurance will not cover it unless billed under that. [place] told me that [facility] in [place] does bill under medicare part B. I pended the glucose meter and supplies for you to sign and send to that pharmacy.

## 2022-06-22 DIAGNOSIS — E11.69 TYPE 2 DIABETES MELLITUS WITH HYPERLIPIDEMIA: Primary | ICD-10-CM

## 2022-06-22 DIAGNOSIS — E78.5 TYPE 2 DIABETES MELLITUS WITH HYPERLIPIDEMIA: Primary | ICD-10-CM

## 2022-07-26 ENCOUNTER — PATIENT MESSAGE (OUTPATIENT)
Dept: PRIMARY CARE CLINIC | Facility: CLINIC | Age: 77
End: 2022-07-26
Payer: MEDICARE

## 2022-07-26 DIAGNOSIS — E78.00 HYPERCHOLESTEREMIA: Chronic | ICD-10-CM

## 2022-07-26 RX ORDER — EZETIMIBE AND SIMVASTATIN 10; 40 MG/1; MG/1
1 TABLET ORAL NIGHTLY
Qty: 90 TABLET | Refills: 3 | Status: SHIPPED | OUTPATIENT
Start: 2022-07-26 | End: 2023-06-28

## 2022-07-26 NOTE — TELEPHONE ENCOUNTER
No new care gaps identified.  [facility] Embedded Care Gaps. Reference number: [id number]. 7/26/2022   3:14:16 PM CDT

## 2022-07-27 DIAGNOSIS — E11.9 TYPE 2 DIABETES MELLITUS WITHOUT COMPLICATION, UNSPECIFIED WHETHER LONG TERM INSULIN USE: ICD-10-CM

## 2022-08-11 LAB
BUN SERPL-MCNC: 23 MG/DL (ref 7–25)
BUN/CREAT SERPL: ABNORMAL (CALC) (ref 6–22)
CALCIUM SERPL-MCNC: 9.2 MG/DL (ref 8.6–10.3)
CHLORIDE SERPL-SCNC: 105 MMOL/L (ref 98–110)
CO2 SERPL-SCNC: 29 MMOL/L (ref 20–32)
CREAT SERPL-MCNC: 1.27 MG/DL (ref 0.7–1.28)
EGFR: 59 ML/MIN/1.73M2
GLUCOSE SERPL-MCNC: 133 MG/DL (ref 65–99)
HBA1C MFR BLD: 6.3 % OF TOTAL HGB
POTASSIUM SERPL-SCNC: 4.8 MMOL/L (ref 3.5–5.3)
SODIUM SERPL-SCNC: 140 MMOL/L (ref 135–146)

## 2022-08-19 ENCOUNTER — OFFICE VISIT (OUTPATIENT)
Dept: PRIMARY CARE CLINIC | Facility: CLINIC | Age: 77
End: 2022-08-19
Payer: MEDICARE

## 2022-08-19 VITALS
TEMPERATURE: 98 F | HEART RATE: 74 BPM | OXYGEN SATURATION: 97 % | HEIGHT: 70 IN | DIASTOLIC BLOOD PRESSURE: 66 MMHG | RESPIRATION RATE: 16 BRPM | SYSTOLIC BLOOD PRESSURE: 114 MMHG | WEIGHT: 230.81 LBS | BODY MASS INDEX: 33.04 KG/M2

## 2022-08-19 DIAGNOSIS — E78.5 TYPE 2 DIABETES MELLITUS WITH HYPERLIPIDEMIA: ICD-10-CM

## 2022-08-19 DIAGNOSIS — E03.8 SUBCLINICAL HYPOTHYROIDISM: ICD-10-CM

## 2022-08-19 DIAGNOSIS — M54.2 CERVICALGIA: Primary | ICD-10-CM

## 2022-08-19 DIAGNOSIS — Z23 NEED FOR VACCINATION: ICD-10-CM

## 2022-08-19 DIAGNOSIS — E78.1 HYPERTRIGLYCERIDEMIA: ICD-10-CM

## 2022-08-19 DIAGNOSIS — E11.69 TYPE 2 DIABETES MELLITUS WITH HYPERLIPIDEMIA: ICD-10-CM

## 2022-08-19 DIAGNOSIS — Z12.5 PROSTATE CANCER SCREENING: ICD-10-CM

## 2022-08-19 DIAGNOSIS — N18.31 STAGE 3A CHRONIC KIDNEY DISEASE: ICD-10-CM

## 2022-08-19 PROCEDURE — 90670 PNEUMOCOCCAL CONJUGATE VACCINE 13-VALENT LESS THAN 5YO & GREATER THAN: ICD-10-PCS | Mod: S$GLB,,, | Performed by: FAMILY MEDICINE

## 2022-08-19 PROCEDURE — 99999 PR PBB SHADOW E&M-EST. PATIENT-LVL IV: CPT | Mod: PBBFAC,,, | Performed by: FAMILY MEDICINE

## 2022-08-19 PROCEDURE — 90670 PCV13 VACCINE IM: CPT | Mod: S$GLB,,, | Performed by: FAMILY MEDICINE

## 2022-08-19 PROCEDURE — 3288F PR FALLS RISK ASSESSMENT DOCUMENTED: ICD-10-PCS | Mod: CPTII,S$GLB,, | Performed by: FAMILY MEDICINE

## 2022-08-19 PROCEDURE — 3008F BODY MASS INDEX DOCD: CPT | Mod: CPTII,S$GLB,, | Performed by: FAMILY MEDICINE

## 2022-08-19 PROCEDURE — 99999 PR PBB SHADOW E&M-EST. PATIENT-LVL IV: ICD-10-PCS | Mod: PBBFAC,,, | Performed by: FAMILY MEDICINE

## 2022-08-19 PROCEDURE — 1159F PR MEDICATION LIST DOCUMENTED IN MEDICAL RECORD: ICD-10-PCS | Mod: CPTII,S$GLB,, | Performed by: FAMILY MEDICINE

## 2022-08-19 PROCEDURE — 3288F FALL RISK ASSESSMENT DOCD: CPT | Mod: CPTII,S$GLB,, | Performed by: FAMILY MEDICINE

## 2022-08-19 PROCEDURE — 99214 PR OFFICE/OUTPT VISIT, EST, LEVL IV, 30-39 MIN: ICD-10-PCS | Mod: S$GLB,,, | Performed by: FAMILY MEDICINE

## 2022-08-19 PROCEDURE — 1101F PR PT FALLS ASSESS DOC 0-1 FALLS W/OUT INJ PAST YR: ICD-10-PCS | Mod: CPTII,S$GLB,, | Performed by: FAMILY MEDICINE

## 2022-08-19 PROCEDURE — 3044F HG A1C LEVEL LT 7.0%: CPT | Mod: CPTII,S$GLB,, | Performed by: FAMILY MEDICINE

## 2022-08-19 PROCEDURE — 3078F PR MOST RECENT DIASTOLIC BLOOD PRESSURE < 80 MM HG: ICD-10-PCS | Mod: CPTII,S$GLB,, | Performed by: FAMILY MEDICINE

## 2022-08-19 PROCEDURE — 1160F PR REVIEW ALL MEDS BY PRESCRIBER/CLIN PHARMACIST DOCUMENTED: ICD-10-PCS | Mod: CPTII,S$GLB,, | Performed by: FAMILY MEDICINE

## 2022-08-19 PROCEDURE — 3008F PR BODY MASS INDEX (BMI) DOCUMENTED: ICD-10-PCS | Mod: CPTII,S$GLB,, | Performed by: FAMILY MEDICINE

## 2022-08-19 PROCEDURE — 1126F PR PAIN SEVERITY QUANTIFIED, NO PAIN PRESENT: ICD-10-PCS | Mod: CPTII,S$GLB,, | Performed by: FAMILY MEDICINE

## 2022-08-19 PROCEDURE — 1101F PT FALLS ASSESS-DOCD LE1/YR: CPT | Mod: CPTII,S$GLB,, | Performed by: FAMILY MEDICINE

## 2022-08-19 PROCEDURE — 3074F PR MOST RECENT SYSTOLIC BLOOD PRESSURE < 130 MM HG: ICD-10-PCS | Mod: CPTII,S$GLB,, | Performed by: FAMILY MEDICINE

## 2022-08-19 PROCEDURE — 99214 OFFICE O/P EST MOD 30 MIN: CPT | Mod: S$GLB,,, | Performed by: FAMILY MEDICINE

## 2022-08-19 PROCEDURE — 1126F AMNT PAIN NOTED NONE PRSNT: CPT | Mod: CPTII,S$GLB,, | Performed by: FAMILY MEDICINE

## 2022-08-19 PROCEDURE — 3078F DIAST BP <80 MM HG: CPT | Mod: CPTII,S$GLB,, | Performed by: FAMILY MEDICINE

## 2022-08-19 PROCEDURE — G0009 PNEUMOCOCCAL CONJUGATE VACCINE 13-VALENT LESS THAN 5YO & GREATER THAN: ICD-10-PCS | Mod: S$GLB,,, | Performed by: FAMILY MEDICINE

## 2022-08-19 PROCEDURE — 3044F PR MOST RECENT HEMOGLOBIN A1C LEVEL <7.0%: ICD-10-PCS | Mod: CPTII,S$GLB,, | Performed by: FAMILY MEDICINE

## 2022-08-19 PROCEDURE — 3074F SYST BP LT 130 MM HG: CPT | Mod: CPTII,S$GLB,, | Performed by: FAMILY MEDICINE

## 2022-08-19 PROCEDURE — 1160F RVW MEDS BY RX/DR IN RCRD: CPT | Mod: CPTII,S$GLB,, | Performed by: FAMILY MEDICINE

## 2022-08-19 PROCEDURE — G0009 ADMIN PNEUMOCOCCAL VACCINE: HCPCS | Mod: S$GLB,,, | Performed by: FAMILY MEDICINE

## 2022-08-19 PROCEDURE — 1159F MED LIST DOCD IN RCRD: CPT | Mod: CPTII,S$GLB,, | Performed by: FAMILY MEDICINE

## 2022-08-19 RX ORDER — DICLOFENAC SODIUM 75 MG/1
75 TABLET, DELAYED RELEASE ORAL 2 TIMES DAILY PRN
Qty: 60 TABLET | Refills: 1 | Status: SHIPPED | OUTPATIENT
Start: 2022-08-19 | End: 2022-10-13

## 2022-08-19 NOTE — PROGRESS NOTES
"Subjective:       Patient ID: Nael Mistry is a 76 y.o. male.    Chief Complaint: Shoulder Pain (Left side)    Pain sometimes radiates to left upper arm. Pain keeps him up at night. No relief from  meloxicam  DM - A1C down to 6.3 with diet changes    Shoulder Pain   The pain is present in the left shoulder and neck. This is a new problem. The current episode started more than 1 month ago. There has been no history of extremity trauma. The problem occurs daily. The problem has been gradually worsening. The quality of the pain is described as burning. The pain is mild. Pertinent negatives include no fever, headaches, numbness or tingling. He has tried NSAIDS for the symptoms. The treatment provided no relief. His past medical history is significant for diabetes. There is no history of Injuries to Extremity.     Review of Systems   Constitutional: Negative for fever.   Respiratory: Negative for shortness of breath.    Cardiovascular: Negative for chest pain.   Musculoskeletal: Positive for arthralgias and neck pain.   Neurological: Negative for tingling, numbness and headaches.   Psychiatric/Behavioral: Positive for sleep disturbance. Negative for agitation and confusion.       Objective:      Vitals:    22 0950   BP: 114/66   BP Location: Left arm   Patient Position: Sitting   BP Method: Large (Manual)   Pulse: 74   Resp: 16   Temp: 97.5 °F (36.4 °C)   TempSrc: Oral   SpO2: 97%   Weight: 104.7 kg (230 lb 13.2 oz)   Height: 5' 10" (1.778 m)     Physical Exam  Vitals and nursing note reviewed.   Constitutional:       Appearance: He is well-developed.   HENT:      Head: Normocephalic and atraumatic.   Cardiovascular:      Rate and Rhythm: Normal rate and regular rhythm.      Pulses:           Radial pulses are 2+ on the right side and 2+ on the left side.      Heart sounds: Normal heart sounds.   Pulmonary:      Effort: Pulmonary effort is normal.      Breath sounds: Normal breath sounds. "   Musculoskeletal:      Left shoulder: Normal. No swelling, deformity, tenderness or bony tenderness. Normal range of motion. Normal strength.      Cervical back: No swelling, deformity, tenderness, bony tenderness or crepitus. Decreased range of motion.   Skin:     General: Skin is warm and dry.   Neurological:      Mental Status: He is alert and oriented to person, place, and time.      Motor: Motor function is intact.   Psychiatric:         Mood and Affect: Mood normal.         Behavior: Behavior normal.         Lab Results   Component Value Date    WBC 6.3 02/08/2022    HGB 12.9 (L) 02/08/2022    HCT 38.3 (L) 02/08/2022     02/08/2022    CHOL 162 02/08/2022    TRIG 178 (H) 02/08/2022    HDL 49 02/08/2022    ALT 30 02/08/2022    AST 23 02/08/2022     08/10/2022    K 4.8 08/10/2022     08/10/2022    CREATININE 1.27 08/10/2022    BUN 23 08/10/2022    CO2 29 08/10/2022    TSH 3.82 04/20/2022    PSA 1.6 02/01/2019    HGBA1C 6.3 (H) 08/10/2022    MICROALBUR 0.3 08/09/2021      Assessment:       1. Cervicalgia    2. Stage 3a chronic kidney disease    3. Type 2 diabetes mellitus with hyperlipidemia    4. Prostate cancer screening    5. Hypertriglyceridemia    6. Subclinical hypothyroidism    7. Need for vaccination        Plan:       Cervicalgia  -     X-Ray Cervical Spine AP And Lateral; Future; Expected date: 08/19/2022  -     diclofenac (VOLTAREN) 75 MG EC tablet; Take 1 tablet (75 mg total) by mouth 2 (two) times daily as needed (pain).  Dispense: 60 tablet; Refill: 1  Likely arthritis. Might benefit from PT, but pt wants to hold of for now  Stage 3a chronic kidney disease  -     CBC Auto Differential; Future; Expected date: 02/19/2023  stable  Type 2 diabetes mellitus with hyperlipidemia  -     Comprehensive Metabolic Panel; Future; Expected date: 02/19/2023  -     Hemoglobin A1C; Future; Expected date: 02/19/2023  -     Microalbumin/Creatinine Ratio, Urine; Future; Expected date:  02/19/2023  Well controlled on diet  Prostate cancer screening  -     PSA, Screening; Future; Expected date: 02/19/2023    Hypertriglyceridemia  -     Comprehensive Metabolic Panel; Future; Expected date: 02/19/2023  -     Lipid Panel; Future; Expected date: 02/19/2023    Subclinical hypothyroidism  -     TSH; Future; Expected date: 02/19/2023      Medication List with Changes/Refills   New Medications    DICLOFENAC (VOLTAREN) 75 MG EC TABLET    Take 1 tablet (75 mg total) by mouth 2 (two) times daily as needed (pain).   Current Medications    ASPIRIN (ECOTRIN) 81 MG EC TABLET    Take 1 tablet (81 mg total) by mouth once daily.    EZETIMIBE-SIMVASTATIN 10-40 MG (VYTORIN) 10-40 MG PER TABLET    Take 1 tablet by mouth every evening.    ONETOUCH DELICA PLUS LANCET 33 GAUGE MISC    1 lancet by Misc.(Non-Drug; Combo Route) route 2 (two) times daily.    ONETOUCH VERIO IQ METER MISC    1 kit by Misc.(Non-Drug; Combo Route) route 2 (two) times daily.    ONETOUCH VERIO TEST STRIPS STRP    1 strip by Misc.(Non-Drug; Combo Route) route 2 (two) times daily.

## 2022-11-04 ENCOUNTER — PATIENT OUTREACH (OUTPATIENT)
Dept: ADMINISTRATIVE | Facility: HOSPITAL | Age: 77
End: 2022-11-04
Payer: MEDICARE

## 2022-11-04 ENCOUNTER — PATIENT MESSAGE (OUTPATIENT)
Dept: ADMINISTRATIVE | Facility: HOSPITAL | Age: 77
End: 2022-11-04
Payer: MEDICARE

## 2023-02-06 ENCOUNTER — PATIENT MESSAGE (OUTPATIENT)
Dept: ORTHOPEDICS | Facility: CLINIC | Age: 78
End: 2023-02-06
Payer: MEDICARE

## 2023-02-28 ENCOUNTER — PATIENT OUTREACH (OUTPATIENT)
Dept: ADMINISTRATIVE | Facility: HOSPITAL | Age: 78
End: 2023-02-28
Payer: MEDICARE

## 2023-02-28 NOTE — PROGRESS NOTES
Health Maintenance Due   Topic Date Due    Eye Exam  Never done    TETANUS VACCINE  Never done    COVID-19 Vaccine (4 - Booster for Pfizer series) 12/17/2021    Diabetes Urine Screening  08/09/2022    Lipid Panel  02/08/2023    Hemoglobin A1c  02/10/2023    Foot Exam  04/20/2023        Chart review done.   HM updated.   Immunizations reviewed & updated.   Care Everywhere updated.   [facility] reviewed

## 2023-03-02 ENCOUNTER — PATIENT MESSAGE (OUTPATIENT)
Dept: PRIMARY CARE CLINIC | Facility: CLINIC | Age: 78
End: 2023-03-02
Payer: MEDICARE

## 2023-03-02 NOTE — TELEPHONE ENCOUNTER
I called and spoke to the pts' wife who adv the pt has been having problems for several weeks with hip and lower leg pain and that this week he is also now having groin pain and burning. She denies and fever, lumps/masses, rash or discoloration to any of the areas. I am able to work him in sooner on 3/7/23, pt would like to know if you have any recommendations  until he is able to be seen.

## 2023-03-03 ENCOUNTER — PATIENT MESSAGE (OUTPATIENT)
Dept: PRIMARY CARE CLINIC | Facility: CLINIC | Age: 78
End: 2023-03-03
Payer: MEDICARE

## 2023-03-04 LAB
ALBUMIN SERPL-MCNC: 4.3 G/DL (ref 3.6–5.1)
ALBUMIN/GLOB SERPL: 1.8 (CALC) (ref 1–2.5)
ALP SERPL-CCNC: 57 U/L (ref 35–144)
ALT SERPL-CCNC: 28 U/L (ref 9–46)
AST SERPL-CCNC: 20 U/L (ref 10–35)
BASOPHILS # BLD AUTO: 29 CELLS/UL (ref 0–200)
BASOPHILS NFR BLD AUTO: 0.4 %
BILIRUB SERPL-MCNC: 0.9 MG/DL (ref 0.2–1.2)
BUN SERPL-MCNC: 23 MG/DL (ref 7–25)
BUN/CREAT SERPL: ABNORMAL (CALC) (ref 6–22)
CALCIUM SERPL-MCNC: 8.9 MG/DL (ref 8.6–10.3)
CHLORIDE SERPL-SCNC: 102 MMOL/L (ref 98–110)
CHOLEST SERPL-MCNC: 135 MG/DL
CHOLEST/HDLC SERPL: 3.5 (CALC)
CO2 SERPL-SCNC: 27 MMOL/L (ref 20–32)
CREAT SERPL-MCNC: 1.17 MG/DL (ref 0.7–1.28)
EGFR: 65 ML/MIN/1.73M2
EOSINOPHIL # BLD AUTO: 117 CELLS/UL (ref 15–500)
EOSINOPHIL NFR BLD AUTO: 1.6 %
ERYTHROCYTE [DISTWIDTH] IN BLOOD BY AUTOMATED COUNT: 12.8 % (ref 11–15)
GLOBULIN SER CALC-MCNC: 2.4 G/DL (CALC) (ref 1.9–3.7)
GLUCOSE SERPL-MCNC: 123 MG/DL (ref 65–99)
HBA1C MFR BLD: 5.9 % OF TOTAL HGB
HCT VFR BLD AUTO: 36.1 % (ref 38.5–50)
HDLC SERPL-MCNC: 39 MG/DL
HGB BLD-MCNC: 12.2 G/DL (ref 13.2–17.1)
LDLC SERPL CALC-MCNC: 71 MG/DL (CALC)
LYMPHOCYTES # BLD AUTO: 2168 CELLS/UL (ref 850–3900)
LYMPHOCYTES NFR BLD AUTO: 29.7 %
MCH RBC QN AUTO: 29 PG (ref 27–33)
MCHC RBC AUTO-ENTMCNC: 33.8 G/DL (ref 32–36)
MCV RBC AUTO: 86 FL (ref 80–100)
MONOCYTES # BLD AUTO: 745 CELLS/UL (ref 200–950)
MONOCYTES NFR BLD AUTO: 10.2 %
NEUTROPHILS # BLD AUTO: 4241 CELLS/UL (ref 1500–7800)
NEUTROPHILS NFR BLD AUTO: 58.1 %
NONHDLC SERPL-MCNC: 96 MG/DL (CALC)
PLATELET # BLD AUTO: 170 THOUSAND/UL (ref 140–400)
PMV BLD REES-ECKER: 10.1 FL (ref 7.5–12.5)
POTASSIUM SERPL-SCNC: 4.6 MMOL/L (ref 3.5–5.3)
PROT SERPL-MCNC: 6.7 G/DL (ref 6.1–8.1)
PSA SERPL-MCNC: 0.75 NG/ML
RBC # BLD AUTO: 4.2 MILLION/UL (ref 4.2–5.8)
SODIUM SERPL-SCNC: 137 MMOL/L (ref 135–146)
TRIGL SERPL-MCNC: 171 MG/DL
TSH SERPL-ACNC: 6.55 MIU/L (ref 0.4–4.5)
WBC # BLD AUTO: 7.3 THOUSAND/UL (ref 3.8–10.8)

## 2023-03-07 ENCOUNTER — OFFICE VISIT (OUTPATIENT)
Dept: PRIMARY CARE CLINIC | Facility: CLINIC | Age: 78
End: 2023-03-07
Payer: MEDICARE

## 2023-03-07 VITALS
WEIGHT: 234.69 LBS | DIASTOLIC BLOOD PRESSURE: 72 MMHG | OXYGEN SATURATION: 99 % | HEART RATE: 68 BPM | RESPIRATION RATE: 18 BRPM | SYSTOLIC BLOOD PRESSURE: 124 MMHG | TEMPERATURE: 98 F | HEIGHT: 70 IN | BODY MASS INDEX: 33.6 KG/M2

## 2023-03-07 DIAGNOSIS — S76.211A STRAIN OF GROIN, RIGHT, INITIAL ENCOUNTER: ICD-10-CM

## 2023-03-07 DIAGNOSIS — E03.8 SUBCLINICAL HYPOTHYROIDISM: ICD-10-CM

## 2023-03-07 DIAGNOSIS — E78.5 TYPE 2 DIABETES MELLITUS WITH HYPERLIPIDEMIA: Primary | ICD-10-CM

## 2023-03-07 DIAGNOSIS — E78.1 HYPERTRIGLYCERIDEMIA: ICD-10-CM

## 2023-03-07 DIAGNOSIS — N18.31 STAGE 3A CHRONIC KIDNEY DISEASE: ICD-10-CM

## 2023-03-07 DIAGNOSIS — E11.69 TYPE 2 DIABETES MELLITUS WITH HYPERLIPIDEMIA: Primary | ICD-10-CM

## 2023-03-07 PROCEDURE — 1125F PR PAIN SEVERITY QUANTIFIED, PAIN PRESENT: ICD-10-PCS | Mod: CPTII,S$GLB,, | Performed by: FAMILY MEDICINE

## 2023-03-07 PROCEDURE — 1101F PR PT FALLS ASSESS DOC 0-1 FALLS W/OUT INJ PAST YR: ICD-10-PCS | Mod: CPTII,S$GLB,, | Performed by: FAMILY MEDICINE

## 2023-03-07 PROCEDURE — 99999 PR PBB SHADOW E&M-EST. PATIENT-LVL IV: CPT | Mod: PBBFAC,,, | Performed by: FAMILY MEDICINE

## 2023-03-07 PROCEDURE — 99999 PR PBB SHADOW E&M-EST. PATIENT-LVL IV: ICD-10-PCS | Mod: PBBFAC,,, | Performed by: FAMILY MEDICINE

## 2023-03-07 PROCEDURE — 3044F HG A1C LEVEL LT 7.0%: CPT | Mod: CPTII,S$GLB,, | Performed by: FAMILY MEDICINE

## 2023-03-07 PROCEDURE — 3044F PR MOST RECENT HEMOGLOBIN A1C LEVEL <7.0%: ICD-10-PCS | Mod: CPTII,S$GLB,, | Performed by: FAMILY MEDICINE

## 2023-03-07 PROCEDURE — 3074F PR MOST RECENT SYSTOLIC BLOOD PRESSURE < 130 MM HG: ICD-10-PCS | Mod: CPTII,S$GLB,, | Performed by: FAMILY MEDICINE

## 2023-03-07 PROCEDURE — 1160F RVW MEDS BY RX/DR IN RCRD: CPT | Mod: CPTII,S$GLB,, | Performed by: FAMILY MEDICINE

## 2023-03-07 PROCEDURE — 1160F PR REVIEW ALL MEDS BY PRESCRIBER/CLIN PHARMACIST DOCUMENTED: ICD-10-PCS | Mod: CPTII,S$GLB,, | Performed by: FAMILY MEDICINE

## 2023-03-07 PROCEDURE — 99214 OFFICE O/P EST MOD 30 MIN: CPT | Mod: S$GLB,,, | Performed by: FAMILY MEDICINE

## 2023-03-07 PROCEDURE — 3078F PR MOST RECENT DIASTOLIC BLOOD PRESSURE < 80 MM HG: ICD-10-PCS | Mod: CPTII,S$GLB,, | Performed by: FAMILY MEDICINE

## 2023-03-07 PROCEDURE — 1159F MED LIST DOCD IN RCRD: CPT | Mod: CPTII,S$GLB,, | Performed by: FAMILY MEDICINE

## 2023-03-07 PROCEDURE — 99214 PR OFFICE/OUTPT VISIT, EST, LEVL IV, 30-39 MIN: ICD-10-PCS | Mod: S$GLB,,, | Performed by: FAMILY MEDICINE

## 2023-03-07 PROCEDURE — 3078F DIAST BP <80 MM HG: CPT | Mod: CPTII,S$GLB,, | Performed by: FAMILY MEDICINE

## 2023-03-07 PROCEDURE — 3074F SYST BP LT 130 MM HG: CPT | Mod: CPTII,S$GLB,, | Performed by: FAMILY MEDICINE

## 2023-03-07 PROCEDURE — 1125F AMNT PAIN NOTED PAIN PRSNT: CPT | Mod: CPTII,S$GLB,, | Performed by: FAMILY MEDICINE

## 2023-03-07 PROCEDURE — 3288F PR FALLS RISK ASSESSMENT DOCUMENTED: ICD-10-PCS | Mod: CPTII,S$GLB,, | Performed by: FAMILY MEDICINE

## 2023-03-07 PROCEDURE — 3288F FALL RISK ASSESSMENT DOCD: CPT | Mod: CPTII,S$GLB,, | Performed by: FAMILY MEDICINE

## 2023-03-07 PROCEDURE — 1159F PR MEDICATION LIST DOCUMENTED IN MEDICAL RECORD: ICD-10-PCS | Mod: CPTII,S$GLB,, | Performed by: FAMILY MEDICINE

## 2023-03-07 PROCEDURE — 1101F PT FALLS ASSESS-DOCD LE1/YR: CPT | Mod: CPTII,S$GLB,, | Performed by: FAMILY MEDICINE

## 2023-03-07 RX ORDER — KETOROLAC TROMETHAMINE 5 MG/ML
1 SOLUTION OPHTHALMIC 4 TIMES DAILY
COMMUNITY
Start: 2023-03-02 | End: 2023-09-13

## 2023-03-07 RX ORDER — DICLOFENAC SODIUM 75 MG/1
75 TABLET, DELAYED RELEASE ORAL 2 TIMES DAILY PRN
Qty: 180 TABLET | Refills: 0 | Status: SHIPPED | OUTPATIENT
Start: 2023-03-07 | End: 2024-03-25 | Stop reason: SDUPTHER

## 2023-03-07 RX ORDER — PREDNISOLONE ACETATE 10 MG/ML
1 SUSPENSION/ DROPS OPHTHALMIC 4 TIMES DAILY
COMMUNITY
Start: 2023-03-02 | End: 2023-09-13

## 2023-03-07 RX ORDER — MOXIFLOXACIN 5 MG/ML
1 SOLUTION/ DROPS OPHTHALMIC 4 TIMES DAILY
COMMUNITY
Start: 2023-02-01 | End: 2023-09-13

## 2023-03-08 NOTE — PROGRESS NOTES
"Subjective:       Patient ID: Nael Mistry is a 77 y.o. male.    Chief Complaint: Leg Pain (Pt in for evaluation of right leg pain )    Recent labs all reviewed.  Has gotten A1c down to 5.9% without medications.  Would like to avoid as many medications as possible.  Lipid panel looks good.  No chest pain or shortness of breath.  Has been having right upper, inner thigh/groin pain for the past several weeks.  No known injury.  Worse with lateral movements.  No loss of strength.    Review of Systems   Constitutional:  Negative for unexpected weight change.   Respiratory:  Negative for shortness of breath.    Cardiovascular:  Negative for chest pain.   Genitourinary:  Negative for difficulty urinating.   Musculoskeletal:  Positive for arthralgias and myalgias.   Neurological:  Negative for weakness.   Psychiatric/Behavioral:  Negative for agitation.      Objective:      Vitals:    03/07/23 1628   BP: 124/72   BP Location: Left arm   Patient Position: Sitting   BP Method: Medium (Manual)   Pulse: 68   Resp: 18   Temp: 97.8 °F (36.6 °C)   TempSrc: Temporal   SpO2: 99%   Weight: 106.4 kg (234 lb 10.9 oz)   Height: 5' 10" (1.778 m)     Physical Exam  Vitals and nursing note reviewed.   Constitutional:       General: He is not in acute distress.     Appearance: Normal appearance. He is well-developed.   HENT:      Head: Normocephalic and atraumatic.   Cardiovascular:      Rate and Rhythm: Normal rate and regular rhythm.      Heart sounds: Normal heart sounds.   Pulmonary:      Effort: Pulmonary effort is normal.      Breath sounds: Normal breath sounds.   Musculoskeletal:      Right hip: Normal.      Right upper leg: Tenderness present. No deformity or bony tenderness.      Right lower leg: No edema.      Left lower leg: No edema.        Legs:    Skin:     General: Skin is warm and dry.   Neurological:      Mental Status: He is alert and oriented to person, place, and time.   Psychiatric:         Mood and Affect: " Mood normal.         Behavior: Behavior normal.       Lab Results   Component Value Date    WBC 7.3 03/03/2023    HGB 12.2 (L) 03/03/2023    HCT 36.1 (L) 03/03/2023     03/03/2023    CHOL 135 03/03/2023    TRIG 171 (H) 03/03/2023    HDL 39 (L) 03/03/2023    ALT 28 03/03/2023    AST 20 03/03/2023     03/03/2023    K 4.6 03/03/2023     03/03/2023    CREATININE 1.17 03/03/2023    BUN 23 03/03/2023    CO2 27 03/03/2023    TSH 6.55 (H) 03/03/2023    PSA 1.6 02/01/2019    HGBA1C 5.9 (H) 03/03/2023    MICROALBUR 0.3 08/09/2021      Assessment:       1. Type 2 diabetes mellitus with hyperlipidemia    2. Stage 3a chronic kidney disease    3. Subclinical hypothyroidism    4. Hypertriglyceridemia    5. Strain of groin, right, initial encounter          Plan:       Type 2 diabetes mellitus with hyperlipidemia  -     Comprehensive Metabolic Panel; Future; Expected date: 09/07/2023  -     Hemoglobin A1C; Future; Expected date: 09/07/2023  -     Microalbumin/Creatinine Ratio, Urine; Future; Expected date: 09/07/2023  Excellent glycemic control via lifestyle modification  Stage 3a chronic kidney disease  -     Comprehensive Metabolic Panel; Future; Expected date: 09/07/2023  Stable, avoid nephrotoxins  Subclinical hypothyroidism  -     TSH; Future; Expected date: 09/07/2023  -     T4, Free; Future; Expected date: 09/07/2023  Continue to monitor periodically  Hypertriglyceridemia  Continue statin  Strain of groin, right, initial encounter  Symptomatic treatment  Other orders  -     diclofenac (VOLTAREN) 75 MG EC tablet; Take 1 tablet (75 mg total) by mouth 2 (two) times daily as needed (pain). for pain  Dispense: 180 tablet; Refill: 0      Medication List with Changes/Refills   Current Medications    ASPIRIN (ECOTRIN) 81 MG EC TABLET    Take 1 tablet (81 mg total) by mouth once daily.    EZETIMIBE-SIMVASTATIN 10-40 MG (VYTORIN) 10-40 MG PER TABLET    Take 1 tablet by mouth every evening.    KETOROLAC 0.5% (ACULAR)  0.5 % DROP    Place 1 drop into the right eye 4 (four) times daily.    MOXIFLOXACIN (VIGAMOX) 0.5 % OPHTHALMIC SOLUTION    Place 1 drop into the right eye 4 (four) times daily.    ONETOUCH DELICA PLUS LANCET 33 GAUGE MISC    1 lancet by Misc.(Non-Drug; Combo Route) route 2 (two) times daily.    ONETOUCH VERIO IQ METER MISC    1 kit by Misc.(Non-Drug; Combo Route) route 2 (two) times daily.    ONETOUCH VERIO TEST STRIPS STRP    1 strip by Misc.(Non-Drug; Combo Route) route 2 (two) times daily.    PREDNISOLONE ACETATE (PRED FORTE) 1 % DRPS    Place 1 drop into the right eye 4 (four) times daily.   Changed and/or Refilled Medications    Modified Medication Previous Medication    DICLOFENAC (VOLTAREN) 75 MG EC TABLET diclofenac (VOLTAREN) 75 MG EC tablet       Take 1 tablet (75 mg total) by mouth 2 (two) times daily as needed (pain). for pain    TAKE ONE TABLET BY MOUTH TWICE DAILY AS NEEDED FOR PAIN

## 2023-04-06 LAB
LEFT EYE DM RETINOPATHY: NEGATIVE
RIGHT EYE DM RETINOPATHY: NEGATIVE

## 2023-06-27 DIAGNOSIS — E78.00 HYPERCHOLESTEREMIA: Chronic | ICD-10-CM

## 2023-06-28 RX ORDER — EZETIMIBE AND SIMVASTATIN 10; 40 MG/1; MG/1
TABLET ORAL
Qty: 90 TABLET | Refills: 2 | Status: SHIPPED | OUTPATIENT
Start: 2023-06-28 | End: 2024-03-25 | Stop reason: SDUPTHER

## 2023-06-28 NOTE — TELEPHONE ENCOUNTER
Refill Decision Note   Nael Mistry  is requesting a refill authorization.  Brief Assessment and Rationale for Refill:  Approve     Medication Therapy Plan:         Comments:     Note composed:4:29 AM 06/28/2023

## 2023-06-28 NOTE — TELEPHONE ENCOUNTER
No care due was identified.  [facility] Embedded Care Due Messages. Reference number: [id number].   6/27/2023 11:31:57 PM CDT

## 2023-08-30 ENCOUNTER — PATIENT OUTREACH (OUTPATIENT)
Dept: ADMINISTRATIVE | Facility: HOSPITAL | Age: 78
End: 2023-08-30
Payer: MEDICARE

## 2023-08-30 NOTE — PROGRESS NOTES
Health Maintenance Due   Topic Date Due    Eye Exam  Never done    TETANUS VACCINE  Never done    COVID-19 Vaccine (4 - Pfizer series) 12/17/2021    Diabetes Urine Screening  08/09/2022    Foot Exam  04/20/2023    Hemoglobin A1c  09/03/2023        Chart review done.    updated.   Immunizations reviewed & updated.   Care Everywhere updated.

## 2023-09-09 LAB
ALBUMIN SERPL-MCNC: 4.6 G/DL (ref 3.6–5.1)
ALBUMIN/CREAT UR: NORMAL MCG/MG CREAT
ALBUMIN/GLOB SERPL: 1.8 (CALC) (ref 1–2.5)
ALP SERPL-CCNC: 59 U/L (ref 35–144)
ALT SERPL-CCNC: 65 U/L (ref 9–46)
AST SERPL-CCNC: 38 U/L (ref 10–35)
BILIRUB SERPL-MCNC: 0.6 MG/DL (ref 0.2–1.2)
BUN SERPL-MCNC: 21 MG/DL (ref 7–25)
BUN/CREAT SERPL: 16 (CALC) (ref 6–22)
CALCIUM SERPL-MCNC: 9.4 MG/DL (ref 8.6–10.3)
CHLORIDE SERPL-SCNC: 102 MMOL/L (ref 98–110)
CO2 SERPL-SCNC: 27 MMOL/L (ref 20–32)
CREAT SERPL-MCNC: 1.34 MG/DL (ref 0.7–1.28)
CREAT UR-MCNC: 70 MG/DL (ref 20–320)
EGFR: 55 ML/MIN/1.73M2
GLOBULIN SER CALC-MCNC: 2.5 G/DL (CALC) (ref 1.9–3.7)
GLUCOSE SERPL-MCNC: 133 MG/DL (ref 65–99)
HBA1C MFR BLD: 6.1 % OF TOTAL HGB
MICROALBUMIN UR-MCNC: <0.2 MG/DL
POTASSIUM SERPL-SCNC: 4.7 MMOL/L (ref 3.5–5.3)
PROT SERPL-MCNC: 7.1 G/DL (ref 6.1–8.1)
SODIUM SERPL-SCNC: 138 MMOL/L (ref 135–146)
T4 FREE SERPL-MCNC: 1.1 NG/DL (ref 0.8–1.8)
TSH SERPL-ACNC: 4.28 MIU/L (ref 0.4–4.5)

## 2023-09-13 ENCOUNTER — OFFICE VISIT (OUTPATIENT)
Dept: PRIMARY CARE CLINIC | Facility: CLINIC | Age: 78
End: 2023-09-13
Payer: MEDICARE

## 2023-09-13 VITALS
BODY MASS INDEX: 32.93 KG/M2 | DIASTOLIC BLOOD PRESSURE: 76 MMHG | TEMPERATURE: 98 F | HEIGHT: 70 IN | SYSTOLIC BLOOD PRESSURE: 136 MMHG | HEART RATE: 80 BPM | WEIGHT: 230.06 LBS | RESPIRATION RATE: 18 BRPM | OXYGEN SATURATION: 96 %

## 2023-09-13 DIAGNOSIS — R74.8 ELEVATED LIVER ENZYMES: ICD-10-CM

## 2023-09-13 DIAGNOSIS — Z23 NEED FOR VACCINATION: ICD-10-CM

## 2023-09-13 DIAGNOSIS — M16.11 PRIMARY OSTEOARTHRITIS OF RIGHT HIP: ICD-10-CM

## 2023-09-13 DIAGNOSIS — E03.8 SUBCLINICAL HYPOTHYROIDISM: ICD-10-CM

## 2023-09-13 DIAGNOSIS — N18.31 STAGE 3A CHRONIC KIDNEY DISEASE: ICD-10-CM

## 2023-09-13 DIAGNOSIS — E78.5 TYPE 2 DIABETES MELLITUS WITH HYPERLIPIDEMIA: Primary | ICD-10-CM

## 2023-09-13 DIAGNOSIS — E11.69 TYPE 2 DIABETES MELLITUS WITH HYPERLIPIDEMIA: Primary | ICD-10-CM

## 2023-09-13 PROCEDURE — 1101F PR PT FALLS ASSESS DOC 0-1 FALLS W/OUT INJ PAST YR: ICD-10-PCS | Mod: CPTII,S$GLB,, | Performed by: FAMILY MEDICINE

## 2023-09-13 PROCEDURE — 1159F MED LIST DOCD IN RCRD: CPT | Mod: CPTII,S$GLB,, | Performed by: FAMILY MEDICINE

## 2023-09-13 PROCEDURE — 90694 VACC AIIV4 NO PRSRV 0.5ML IM: CPT | Mod: S$GLB,,, | Performed by: FAMILY MEDICINE

## 2023-09-13 PROCEDURE — 3044F HG A1C LEVEL LT 7.0%: CPT | Mod: CPTII,S$GLB,, | Performed by: FAMILY MEDICINE

## 2023-09-13 PROCEDURE — 99214 OFFICE O/P EST MOD 30 MIN: CPT | Mod: 25,S$GLB,, | Performed by: FAMILY MEDICINE

## 2023-09-13 PROCEDURE — 1160F PR REVIEW ALL MEDS BY PRESCRIBER/CLIN PHARMACIST DOCUMENTED: ICD-10-PCS | Mod: CPTII,S$GLB,, | Performed by: FAMILY MEDICINE

## 2023-09-13 PROCEDURE — 1160F RVW MEDS BY RX/DR IN RCRD: CPT | Mod: CPTII,S$GLB,, | Performed by: FAMILY MEDICINE

## 2023-09-13 PROCEDURE — 1125F PR PAIN SEVERITY QUANTIFIED, PAIN PRESENT: ICD-10-PCS | Mod: CPTII,S$GLB,, | Performed by: FAMILY MEDICINE

## 2023-09-13 PROCEDURE — 3288F PR FALLS RISK ASSESSMENT DOCUMENTED: ICD-10-PCS | Mod: CPTII,S$GLB,, | Performed by: FAMILY MEDICINE

## 2023-09-13 PROCEDURE — G0008 ADMIN INFLUENZA VIRUS VAC: HCPCS | Mod: S$GLB,,, | Performed by: FAMILY MEDICINE

## 2023-09-13 PROCEDURE — 99999 PR PBB SHADOW E&M-EST. PATIENT-LVL V: ICD-10-PCS | Mod: PBBFAC,,, | Performed by: FAMILY MEDICINE

## 2023-09-13 PROCEDURE — 3075F SYST BP GE 130 - 139MM HG: CPT | Mod: CPTII,S$GLB,, | Performed by: FAMILY MEDICINE

## 2023-09-13 PROCEDURE — 3075F PR MOST RECENT SYSTOLIC BLOOD PRESS GE 130-139MM HG: ICD-10-PCS | Mod: CPTII,S$GLB,, | Performed by: FAMILY MEDICINE

## 2023-09-13 PROCEDURE — G0008 FLU VACCINE - QUADRIVALENT - ADJUVANTED: ICD-10-PCS | Mod: S$GLB,,, | Performed by: FAMILY MEDICINE

## 2023-09-13 PROCEDURE — 3044F PR MOST RECENT HEMOGLOBIN A1C LEVEL <7.0%: ICD-10-PCS | Mod: CPTII,S$GLB,, | Performed by: FAMILY MEDICINE

## 2023-09-13 PROCEDURE — 3078F DIAST BP <80 MM HG: CPT | Mod: CPTII,S$GLB,, | Performed by: FAMILY MEDICINE

## 2023-09-13 PROCEDURE — 3078F PR MOST RECENT DIASTOLIC BLOOD PRESSURE < 80 MM HG: ICD-10-PCS | Mod: CPTII,S$GLB,, | Performed by: FAMILY MEDICINE

## 2023-09-13 PROCEDURE — 99999 PR PBB SHADOW E&M-EST. PATIENT-LVL V: CPT | Mod: PBBFAC,,, | Performed by: FAMILY MEDICINE

## 2023-09-13 PROCEDURE — 1159F PR MEDICATION LIST DOCUMENTED IN MEDICAL RECORD: ICD-10-PCS | Mod: CPTII,S$GLB,, | Performed by: FAMILY MEDICINE

## 2023-09-13 PROCEDURE — 90694 FLU VACCINE - QUADRIVALENT - ADJUVANTED: ICD-10-PCS | Mod: S$GLB,,, | Performed by: FAMILY MEDICINE

## 2023-09-13 PROCEDURE — 3288F FALL RISK ASSESSMENT DOCD: CPT | Mod: CPTII,S$GLB,, | Performed by: FAMILY MEDICINE

## 2023-09-13 PROCEDURE — 1101F PT FALLS ASSESS-DOCD LE1/YR: CPT | Mod: CPTII,S$GLB,, | Performed by: FAMILY MEDICINE

## 2023-09-13 PROCEDURE — 99214 PR OFFICE/OUTPT VISIT, EST, LEVL IV, 30-39 MIN: ICD-10-PCS | Mod: 25,S$GLB,, | Performed by: FAMILY MEDICINE

## 2023-09-13 PROCEDURE — 1125F AMNT PAIN NOTED PAIN PRSNT: CPT | Mod: CPTII,S$GLB,, | Performed by: FAMILY MEDICINE

## 2023-09-13 RX ORDER — TRAMADOL HYDROCHLORIDE 50 MG/1
50 TABLET ORAL 2 TIMES DAILY PRN
COMMUNITY
Start: 2023-08-03 | End: 2023-11-22

## 2023-09-13 NOTE — PROGRESS NOTES
Verified pt by name and . NKDA. Per physician orders pt was administered Influenza Vaccine Adjuvanted 0.5 mL  IM to right deltoid using aseptic technique. Pt tolerated well. No adverse effects or pain reported. MD notified.

## 2023-09-13 NOTE — PROGRESS NOTES
"Subjective:       Patient ID: Nael Mistry is a 77 y.o. male.    Chief Complaint: Follow-up (6 month follow up/regular checkup)    Diabetes remains well controlled off medications.  Liver enzymes slightly elevated.  Denies abdominal pain, nausea or vomiting.  Drinks 3-4 beers at a bar several days a week, thinks he can cut back.  Has been eating a little more carbs than usual, as well.  No known history of liver disease.  Has been having progressively worsening right-sided knee and hip pain.  Was evaluated by ortho, advised that he will likely need a hip replacement.  Only got about a week of pain relief from intra-articular steroid injection    Follow-up  Associated symptoms include arthralgias. Pertinent negatives include no chest pain, headaches, joint swelling, neck pain, vomiting or weakness.     Review of Systems   Constitutional:  Negative for activity change and unexpected weight change.   HENT:  Positive for hearing loss. Negative for rhinorrhea and trouble swallowing.    Eyes:  Positive for visual disturbance. Negative for discharge.   Respiratory:  Negative for chest tightness and wheezing.    Cardiovascular:  Negative for chest pain and palpitations.   Gastrointestinal:  Positive for constipation. Negative for blood in stool, diarrhea and vomiting.   Endocrine: Negative for polydipsia.   Genitourinary:  Negative for hematuria and urgency.   Musculoskeletal:  Positive for arthralgias. Negative for joint swelling and neck pain.   Allergic/Immunologic: Negative for immunocompromised state.   Neurological:  Negative for weakness and headaches.   Psychiatric/Behavioral:  Negative for confusion and dysphoric mood.        Objective:      Vitals:    09/13/23 0851   BP: 136/76   BP Location: Right arm   Patient Position: Sitting   BP Method: Medium (Manual)   Pulse: 80   Resp: 18   Temp: 97.5 °F (36.4 °C)   TempSrc: Temporal   SpO2: 96%   Weight: 104.3 kg (230 lb 0.8 oz)   Height: 5' 10" (1.778 m)     BP " Readings from Last 5 Encounters:   09/13/23 136/76   03/07/23 124/72   08/19/22 114/66   04/20/22 124/80   04/13/22 (!) 146/74     Wt Readings from Last 5 Encounters:   09/13/23 104.3 kg (230 lb 0.8 oz)   03/07/23 106.4 kg (234 lb 10.9 oz)   08/19/22 104.7 kg (230 lb 13.2 oz)   04/20/22 112.8 kg (248 lb 9.1 oz)   04/13/22 112.9 kg (248 lb 14.4 oz)     Physical Exam  Vitals and nursing note reviewed.   Constitutional:       General: He is not in acute distress.     Appearance: Normal appearance. He is well-developed.   HENT:      Head: Normocephalic and atraumatic.   Cardiovascular:      Rate and Rhythm: Normal rate and regular rhythm.      Heart sounds: Normal heart sounds.   Pulmonary:      Effort: Pulmonary effort is normal.      Breath sounds: Normal breath sounds.   Musculoskeletal:      Right lower leg: No edema.      Left lower leg: No edema.   Feet:      Right foot:      Protective Sensation: 10 sites tested.  10 sites sensed.      Skin integrity: Skin integrity normal. No ulcer or skin breakdown.      Left foot:      Protective Sensation: 10 sites tested.  10 sites sensed.      Skin integrity: Skin integrity normal. No ulcer or skin breakdown.   Skin:     General: Skin is warm and dry.   Neurological:      Mental Status: He is alert and oriented to person, place, and time.   Psychiatric:         Mood and Affect: Mood normal.         Behavior: Behavior normal.         Lab Results   Component Value Date    WBC 7.3 03/03/2023    HGB 12.2 (L) 03/03/2023    HCT 36.1 (L) 03/03/2023     03/03/2023    CHOL 135 03/03/2023    TRIG 171 (H) 03/03/2023    HDL 39 (L) 03/03/2023    ALT 65 (H) 09/08/2023    AST 38 (H) 09/08/2023     09/08/2023    K 4.7 09/08/2023     09/08/2023    CREATININE 1.34 (H) 09/08/2023    BUN 21 09/08/2023    CO2 27 09/08/2023    TSH 4.28 09/08/2023    PSA 1.6 02/01/2019    HGBA1C 6.1 (H) 09/08/2023    MICROALBUR <0.2 09/08/2023      Assessment:       1. Type 2 diabetes mellitus  with hyperlipidemia    2. Stage 3a chronic kidney disease    3. Subclinical hypothyroidism    4. Elevated liver enzymes    5. Primary osteoarthritis of right hip    6. Need for vaccination        Plan:       Type 2 diabetes mellitus with hyperlipidemia  -     HM DIABETES FOOT EXAM  Excellent glycemic control off meds  Stage 3a chronic kidney disease  Stable, avoid nephrotoxins  Subclinical hypothyroidism  TFTs now within normal limits  Elevated liver enzymes  -     Hepatic Function Panel; Future; Expected date: 10/25/2023  Try to limit alcohol and carbohydrate intake, repeat LFTs in 4-6 weeks  Primary osteoarthritis of right hip  -     Ambulatory referral/consult to Orthopedics; Future; Expected date: 09/20/2023    Need for vaccination  -     Influenza (FLUAD) - Quadrivalent (Adjuvanted) *Preferred* (65+) (PF)      Medication List with Changes/Refills   Current Medications    ASPIRIN (ECOTRIN) 81 MG EC TABLET    Take 1 tablet (81 mg total) by mouth once daily.    DICLOFENAC (VOLTAREN) 75 MG EC TABLET    Take 1 tablet (75 mg total) by mouth 2 (two) times daily as needed (pain). for pain    EZETIMIBE-SIMVASTATIN 10-40 MG (VYTORIN) 10-40 MG PER TABLET    TAKE 1 TABLET EVERY EVENING    ONETOUCH DELICA PLUS LANCET 33 GAUGE MISC    1 lancet by Misc.(Non-Drug; Combo Route) route 2 (two) times daily.    ONETOUCH VERIO IQ METER MISC    1 kit by Misc.(Non-Drug; Combo Route) route 2 (two) times daily.    ONETOUCH VERIO TEST STRIPS STRP    1 strip by Misc.(Non-Drug; Combo Route) route 2 (two) times daily.    TRAMADOL (ULTRAM) 50 MG TABLET    Take 50 mg by mouth 2 (two) times daily as needed. for pain   Discontinued Medications    KETOROLAC 0.5% (ACULAR) 0.5 % DROP    Place 1 drop into the right eye 4 (four) times daily.    MOXIFLOXACIN (VIGAMOX) 0.5 % OPHTHALMIC SOLUTION    Place 1 drop into the right eye 4 (four) times daily.    PREDNISOLONE ACETATE (PRED FORTE) 1 % DRPS    Place 1 drop into the right eye 4 (four) times daily.

## 2023-09-18 ENCOUNTER — PATIENT MESSAGE (OUTPATIENT)
Dept: PRIMARY CARE CLINIC | Facility: CLINIC | Age: 78
End: 2023-09-18
Payer: MEDICARE

## 2023-10-17 ENCOUNTER — OFFICE VISIT (OUTPATIENT)
Dept: ORTHOPEDICS | Facility: CLINIC | Age: 78
End: 2023-10-17
Payer: MEDICARE

## 2023-10-17 VITALS — HEIGHT: 70 IN | BODY MASS INDEX: 32.35 KG/M2 | WEIGHT: 226 LBS

## 2023-10-17 DIAGNOSIS — M54.16 LUMBAR RADICULOPATHY: ICD-10-CM

## 2023-10-17 DIAGNOSIS — M16.11 PRIMARY OSTEOARTHRITIS OF RIGHT HIP: Primary | ICD-10-CM

## 2023-10-17 PROCEDURE — 1160F PR REVIEW ALL MEDS BY PRESCRIBER/CLIN PHARMACIST DOCUMENTED: ICD-10-PCS | Mod: CPTII,S$GLB,, | Performed by: ORTHOPAEDIC SURGERY

## 2023-10-17 PROCEDURE — 1125F PR PAIN SEVERITY QUANTIFIED, PAIN PRESENT: ICD-10-PCS | Mod: CPTII,S$GLB,, | Performed by: ORTHOPAEDIC SURGERY

## 2023-10-17 PROCEDURE — 1101F PR PT FALLS ASSESS DOC 0-1 FALLS W/OUT INJ PAST YR: ICD-10-PCS | Mod: CPTII,S$GLB,, | Performed by: ORTHOPAEDIC SURGERY

## 2023-10-17 PROCEDURE — 3288F FALL RISK ASSESSMENT DOCD: CPT | Mod: CPTII,S$GLB,, | Performed by: ORTHOPAEDIC SURGERY

## 2023-10-17 PROCEDURE — 1160F RVW MEDS BY RX/DR IN RCRD: CPT | Mod: CPTII,S$GLB,, | Performed by: ORTHOPAEDIC SURGERY

## 2023-10-17 PROCEDURE — 99203 PR OFFICE/OUTPT VISIT, NEW, LEVL III, 30-44 MIN: ICD-10-PCS | Mod: S$GLB,,, | Performed by: ORTHOPAEDIC SURGERY

## 2023-10-17 PROCEDURE — 1125F AMNT PAIN NOTED PAIN PRSNT: CPT | Mod: CPTII,S$GLB,, | Performed by: ORTHOPAEDIC SURGERY

## 2023-10-17 PROCEDURE — 99203 OFFICE O/P NEW LOW 30 MIN: CPT | Mod: S$GLB,,, | Performed by: ORTHOPAEDIC SURGERY

## 2023-10-17 PROCEDURE — 1101F PT FALLS ASSESS-DOCD LE1/YR: CPT | Mod: CPTII,S$GLB,, | Performed by: ORTHOPAEDIC SURGERY

## 2023-10-17 PROCEDURE — 1159F MED LIST DOCD IN RCRD: CPT | Mod: CPTII,S$GLB,, | Performed by: ORTHOPAEDIC SURGERY

## 2023-10-17 PROCEDURE — 1159F PR MEDICATION LIST DOCUMENTED IN MEDICAL RECORD: ICD-10-PCS | Mod: CPTII,S$GLB,, | Performed by: ORTHOPAEDIC SURGERY

## 2023-10-17 PROCEDURE — 3288F PR FALLS RISK ASSESSMENT DOCUMENTED: ICD-10-PCS | Mod: CPTII,S$GLB,, | Performed by: ORTHOPAEDIC SURGERY

## 2023-10-17 NOTE — PROGRESS NOTES
[facility]    Subjective:     Chief Complaint:   Chief Complaint   Patient presents with    Lumbar Spine - Pain     Little lumbar pain, pain at bilateral groin, pain down right leg to calf, no numbness       Past Medical History:   Diagnosis Date    Cancer 01/01/1992    colon    Cancer of bladder 2005    Hyperlipidemia     Pre-diabetes        Past Surgical History:   Procedure Laterality Date    BLADDER SURGERY      CATARACT EXTRACTION Bilateral 02/2023    COLONOSCOPY      COLONOSCOPY N/A 11/24/2020    Procedure: COLONOSCOPY;  Surgeon: Andrew oHran MD;  Location: [facility];  Service: Endoscopy;  Laterality: N/A;    EYE SURGERY      HERNIA REPAIR      left inguinal        Current Outpatient Medications   Medication Sig    aspirin (ECOTRIN) 81 MG EC tablet Take 1 tablet (81 mg total) by mouth once daily. (Patient taking differently: Take 81 mg by mouth once a week.)    diclofenac (VOLTAREN) 75 MG EC tablet Take 1 tablet (75 mg total) by mouth 2 (two) times daily as needed (pain). for pain    ezetimibe-simvastatin 10-40 mg (VYTORIN) 10-40 mg per tablet TAKE 1 TABLET EVERY EVENING    ONETOUCH DELICA PLUS LANCET 33 gauge Misc 1 lancet by Misc.(Non-Drug; Combo Route) route 2 (two) times daily.    ONETOUCH VERIO IQ METER Misc 1 kit by Misc.(Non-Drug; Combo Route) route 2 (two) times daily.    ONETOUCH VERIO TEST STRIPS Strp 1 strip by Misc.(Non-Drug; Combo Route) route 2 (two) times daily.    traMADoL (ULTRAM) 50 mg tablet Take 50 mg by mouth 2 (two) times daily as needed. for pain     No current facility-administered medications for this visit.       Review of patient's allergies indicates:  No Known Allergies    Family History   Problem Relation Age of Onset    Alzheimer's disease Mother     Dementia Mother     Cancer Father         prostate     Heart disease Father     No Known Problems Brother        Social History     Socioeconomic History    Marital status:    Occupational History    Occupation:  retired [employer]    Tobacco Use    Smoking status: Never    Smokeless tobacco: Never   Substance and Sexual Activity    Alcohol use: Yes     Alcohol/week: 5.0 standard drinks of alcohol     Types: 5 Cans of beer per week     Comment: 4-5 beer per week     Drug use: No    Sexual activity: Yes     Partners: Female     Birth control/protection: None       History of present illness:  78-year-old male, presents to clinic today for evaluation of complaints of right lower extremity pain.  The patient complains of pain in the right hip and the right knee.  And he has some pain in the right calf.  He has been seen by multiple providers now at this point, he endorses being told that he needs a hip replacement.  He also received intra-articular hip injection back in June with minimal to no relief in his symptoms.  Approximally 1 weeks duration.  He is here today for a 3rd medical opinion.      Review of Systems:    Constitution: Negative for chills, fever, and sweats.  Negative for unexplained weight loss.    HENT:  Negative for headaches and blurry vision.    Cardiovascular:Negative for chest pain or irregular heart beat. Negative for hypertension.    Respiratory:  Negative for cough and shortness of breath.    Gastrointestinal: Negative for abdominal pain, heartburn, melena, nausea, and vomitting.    Genitourinary:  Negative bladder incontinence and dysuria.    Musculoskeletal:  See HPI for details.     Neurological: Negative for numbness.    Psychiatric/Behavioral: Negative for depression.  The patient is not nervous/anxious.      Endocrine: Negative for polyuria    Hematologic/Lymphatic: Negative for bleeding problem.  Does not bruise/bleed easily.    Skin: Negative for poor would healing and rash    Objective:      Physical Examination:    Vital Signs:  There were no vitals filed for this visit.    Body mass index is 32.43 kg/m².    This a well-developed, well nourished patient in no acute distress.  They are alert and  oriented and cooperative to examination.        Examination lumbar spine, no significant midline vertebral tenderness no paravertebral spasm, no significant lumbar sacral pain with palpation.  No restrictions in range of motion with flexion extension rotation or lateral bending.  Straight leg raises are negative.    Examination of the right hip, the patient complains of groin pain with any range of motion of the right hip.  He has severely limited internal rotation of the right hip and severe pain with internal rotation of the right hip.    Examination of the right knee, the skin is dry and intact, no erythema or ecchymosis, no signs symptoms of infection.  Range of motion 0-120 degrees.  The knee is stable in both flexion and extension and he has no significant laxity with varus or valgus stresses.  Calf is soft nontender.  Again straight leg raises are negative.  Pertinent New Results:    XRAY Report / Interpretation:   AP and lateral views of the lumbar spine taken today in the office demonstrate some L5-S1 disc degeneration and disc space collapse.  He has facet arthritic changes noted L1-2 at S1.  No significant foraminal stenosis is appreciated on the lateral view however.    AP and frog-leg lateral views of the right hip taken today in the office demonstrate advanced bone-on-bone arthritis with complete obliteration and cystic formation in the femoral acetabular joint.    AP lateral sunrise views of the right knee taken today in the office demonstrate some mild medial compartment collapse.  No other acute osseous abnormalities.  No large osteophyte formation or condyle squaring is appreciated.    Assessment/Plan:      Right leg pain, patient with advanced right hip osteoarthritis.  Patient has already had intra-articular steroid injection which provided him no relief in his symptoms.  The only thing we have to offer him is right total hip arthroplasty.  We spoke about this in great detail, the expected  "preoperative intraoperative and postoperative courses.  Same-day discharge, home health.  He would like to think about it, his wife's going to be going out of town and will help to care for him a course postoperatively.  He will contact us or follow back up in the office on an as-needed basis when he is ready to schedule surgery.    Patient was consented for surgery. Risks and benefits of the procedure were discussed in great detail. Complications may include but are not limited to bleeding, infection, damage to nerves, arteries, blood vessels, bones, tendons, ligaments, stiffness, instability, deep vein thrombus (DVT), pulmonary embolus (PE), altered sensation, continued pain, RSD, loss of motion or a need for additional surgery. As well as general anesthetic complications including seizure, stroke, heart attack and even death.    Hip precautions avoid bending at the hip past 90°, twisting your leg in or out, and crossing your legs. Sit with your hips higher than your knees, sit in a chair with armrests, and sleep on your back with a pillow between your legs. These should be followed for 3 months after surgery.    Damien Garcia, Physician Assistant, served in the capacity as a "scribe" for this patient encounter.  A "face-to-face" encounter occurred with Dr. Ian Jiménez on this date.  The treatment plan and medical decision-making is outlined above. Patient was seen and examined with a chaperone.       This note was created using Dragon voice recognition software that occasionally misinterpreted phrases or words.          "

## 2023-10-18 ENCOUNTER — PATIENT MESSAGE (OUTPATIENT)
Dept: CARDIOLOGY | Facility: CLINIC | Age: 78
End: 2023-10-18
Payer: MEDICARE

## 2023-10-18 ENCOUNTER — PATIENT MESSAGE (OUTPATIENT)
Dept: PRIMARY CARE CLINIC | Facility: CLINIC | Age: 78
End: 2023-10-18
Payer: MEDICARE

## 2023-10-18 DIAGNOSIS — Z85.038 HISTORY OF COLON CANCER: Primary | ICD-10-CM

## 2023-10-18 DIAGNOSIS — Z85.51 HISTORY OF BLADDER CANCER: ICD-10-CM

## 2023-10-19 LAB
ALBUMIN SERPL-MCNC: 4.4 G/DL (ref 3.6–5.1)
ALBUMIN/GLOB SERPL: 1.8 (CALC) (ref 1–2.5)
ALP SERPL-CCNC: 60 U/L (ref 35–144)
ALT SERPL-CCNC: 37 U/L (ref 9–46)
AST SERPL-CCNC: 22 U/L (ref 10–35)
BILIRUB DIRECT SERPL-MCNC: 0.1 MG/DL
BILIRUB INDIRECT SERPL-MCNC: 0.5 MG/DL (CALC) (ref 0.2–1.2)
BILIRUB SERPL-MCNC: 0.6 MG/DL (ref 0.2–1.2)
GLOBULIN SER CALC-MCNC: 2.5 G/DL (CALC) (ref 1.9–3.7)
PROT SERPL-MCNC: 6.9 G/DL (ref 6.1–8.1)

## 2023-10-23 ENCOUNTER — TELEPHONE (OUTPATIENT)
Dept: ORTHOPEDICS | Facility: CLINIC | Age: 78
End: 2023-10-23

## 2023-10-23 NOTE — TELEPHONE ENCOUNTER
----- Message from Karol Morley MA sent at 10/18/2023  3:30 PM CDT -----  Please call to schedule preop appointments and when to sign consents

## 2023-10-24 ENCOUNTER — PATIENT MESSAGE (OUTPATIENT)
Dept: ORTHOPEDICS | Facility: CLINIC | Age: 78
End: 2023-10-24
Payer: MEDICARE

## 2023-11-02 DIAGNOSIS — M16.11 PRIMARY OSTEOARTHRITIS OF RIGHT HIP: Primary | ICD-10-CM

## 2023-11-02 DIAGNOSIS — Z01.818 PRE-OP TESTING: ICD-10-CM

## 2023-11-13 ENCOUNTER — HOSPITAL ENCOUNTER (OUTPATIENT)
Dept: PREADMISSION TESTING | Facility: HOSPITAL | Age: 78
Discharge: HOME | End: 2023-11-13
Attending: ORTHOPAEDIC SURGERY
Payer: MEDICARE

## 2023-11-13 ENCOUNTER — PATIENT MESSAGE (OUTPATIENT)
Dept: ADMINISTRATIVE | Facility: HOSPITAL | Age: 78
End: 2023-11-13
Payer: MEDICARE

## 2023-11-13 DIAGNOSIS — Z01.818 PREOP TESTING: Primary | ICD-10-CM

## 2023-11-13 DIAGNOSIS — Z01.818 PRE-OP TESTING: ICD-10-CM

## 2023-11-13 DIAGNOSIS — M16.11 PRIMARY OSTEOARTHRITIS OF RIGHT HIP: ICD-10-CM

## 2023-11-13 PROCEDURE — 93010 ELECTROCARDIOGRAM REPORT: CPT | Mod: ,,, | Performed by: GENERAL PRACTICE

## 2023-11-13 PROCEDURE — 93010 EKG 12-LEAD: ICD-10-PCS | Mod: ,,, | Performed by: GENERAL PRACTICE

## 2023-11-13 PROCEDURE — 93005 ELECTROCARDIOGRAM TRACING: CPT

## 2023-11-13 NOTE — DISCHARGE INSTRUCTIONS
To confirm, Your doctor has instructed you that surgery is scheduled for: 11/27/23    Please report to [facility], Registration the morning of surgery. You must check-in and receive a wristband before going to your procedure.  [address] [place] [address] [place] [id number]    Pre-Op will call the afternoon prior to surgery between 1:00 and 6:00 PM with the final arrival time.  Phone number: [phone]    PLEASE NOTE:  The surgery schedule has many variables which may affect the time of your surgery case.  Family members should be available if your surgery time changes.  Plan to be here the day of your procedure between 4-6 hours.    MEDICATIONS:  TAKE ONLY THESE MEDICATIONS WITH A SMALL SIP OF WATER THE MORNING OF YOUR PROCEDURE:      SEE MED LIST    DO NOT TAKE THESE MEDICATIONS 5-7 DAYS PRIOR to your procedure or per your surgeon's request:   ASPIRIN, ALEVE, ADVIL, IBUPROFEN, FISH OIL VITAMIN E, HERBALS  (May take Tylenol)    ONLY if you are prescribed any types of blood thinners such as:  Aspirin, Coumadin, Plavix, Pradaxa, Xarelto, Aggrenox, Effient, Eliquis, Savasya, Brilinta, or any other, ask your surgeon whether you should stop taking them and how long before surgery you should stop.  You may also need to verify with the prescribing physician if it is ok to stop your medication.      INSTRUCTIONS IMPORTANT!!  Do not eat or drink anything between midnight and the time of your procedure- this includes gum, mints, and candy.  Do not smoke or drink alcoholic beverages 24 hours prior to your procedure.  Shower the night before AND the morning of your procedure with a Chlorhexidine wash such as Hibiclens or Dial antibacterial soap from the neck down.  Do not get it on your face or in your eyes.  You may use your own shampoo and face wash. This helps your skin to be as bacteria free as possible.    If you wear contact lenses, dentures, hearing aids or glasses, bring a container to put them in during  surgery and give to a family member for safe keeping.  Please leave all jewelry, piercing's and valuables at home. You must remove your false eyelashes prior to surgery.    DO NOT remove hair from the surgery site.  Do not shave the incision site unless you are given specific instructions to do so.    ONLY if you have been diagnosed with sleep apnea please bring your C-PAP machine.  ONLY if you wear home oxygen please bring your portable oxygen tank the day of your procedure.  ONLY if you have a history of OPEN HEART SURGERY you will need a clearance from your Cardiologist per Anesthesia.      ONLY for patients requiring bowel prep, written instructions will be given by your doctor's office.  ONLY if you have a neuro stimulator, please bring the controller with you the morning of surgery  ONLY if a type and screen test is needed before surgery, please return:  If your doctor has scheduled you for an overnight stay, bring a small overnight bag with any personal items you need.  Make arrangements in advance for transportation home by a responsible adult.  It is not safe to drive a vehicle during the 24 hours after anesthesia.          All  facilities and properties are tobacco free.  Smoking is NOT allowed.   If you have any questions about these instructions, call Pre-Op Admit  Nursing at [phone] or the Pre-Op Day Surgery Unit at [phone].

## 2023-11-13 NOTE — PRE ADMISSION SCREENING
Patient Name: Nael Mistry  YOB: 1945   MRN: [id number]     [facility]   Basic Mobility Inpatient Short Form 6 Clicks         How much difficulty does the patient currently have  Unable  A Lot  A Little  None      1. Turning over in bed (including adjusting bedclothes, sheets and blankets)?     1 []    2 []    3 [x]    4 []        2. Sitting down on and standing up from a chair with arms (e.g., wheelchair, bedside commode, etc.)     1 []  2 []  3 [x]     4 []      3. Moving from lying on back to sitting on the side of the bed?     1 []  2 []  3 []    4 [x]    How much help from another person does the patient currently need  Total  A Lot  A Little  None      4. Moving to and from a bed to a chair (including a wheelchair)?    1 []  2 []  3 [x]    4 []      5. Need to walk in hospital room?    1 []  2 []  3 [x]    4 []      6. Climbing 3-5 steps with a railing?    1 []  2 []  3 [x]    4 []       Raw Score:       19           CMS 0-100% Score:     41.77       %   Standardized Score:      45.44         CMS Modifier:         CK                                 [facility]   Basic [facility] Short Form 6 Clicks Score Conversion Table*         *Use this form to convert -PAC Basic Mobility Inpatient Raw Scores.   [facility] Inpatient Basic Mobility Short Form Scoring Example   1. Add the number values associated with the response to each item. For example, items totals yield a Raw Score of 21.   2. Match the raw score to the t-Scale scores (t-Scale score = 50.25, SE = 4.69).   3. Find the associated CMS % (CMS % = 28.97%).   4. Locate the correct CMS Functional Modifier Code, or G Code (G code = CJ)     NOTE: Each -PAC Short Form has a separate conversion table. Make sure that you use the correct conversion table.       Instruction Manual - page 45 contains conversion table

## 2023-11-13 NOTE — PRE ADMISSION SCREENING
"               [facility] Risk Assessment Scale    Patient Name: Nael Mistry  YOB: 1945  MRN: [id number]            RIsk Factor Measure Recommendation Patient Data Scale/Score   BMI >40 Reconsider surgery, weight loss   Estimated body mass index is 32.43 kg/m² as calculated from the following:    Height as of 10/17/23: 5' 10" (1.778 m).    Weight as of 10/17/23: 102.5 kg (226 lb).   [] 0 = 1 - 24.9  [] 1 = 25-29.9  [x] 2 = 30-34.9  [] 3 = 35-39.9  [] 4 = 40-44.9  [] 5 = 45-99.9   Hemoglobin AIC (if applicable) >9 Delay surgery until DM under control  Refer for:  Nutrition Therapy  Exercise   Medication    Lab Results   Component Value Date    LABA1C 5.7 (H) 01/31/2018    HGBA1C 6.1 (H) 09/08/2023       Lab Results   Component Value Date     (H) 11/13/2023      [] 0 = 4.0-5.6  [x] 1 = 5.7-6.4  [] 2 = 6.5-6.9  [] 3 = 7.0-7.9  [] 4 = 8.0-8.9  [] 5 = 9.0-12   Hemoglobin (Anemia) <9 Delay surgery   Correct anemia Lab Results   Component Value Date    HGB 12.2 (L) 11/13/2023    [] 20 - <9.0                    Albumin <3 Delay surgery &Workup Lab Results   Component Value Date    ALBUMIN 4.0 11/13/2023    [] 20 - <3.0   Smoking Cessation >4 Weeks Delay Surgery  Refer to OP Cessation Class    NA [] 20 - current smoker                                _____ PPD                    Hx of MI, PE, Arrhythmia, CVA, DVT <30 Days Delay Surgery    NA [] 20      Infection Variable Delay surgery and re-evaluate   NA [] 20 - recent/current infection     Depression (PHQ) >10 out of 27 Delay Surgery and re-evaluate  Medication  Counseling              [x] 0     []1     []2     []3      []4      [] 5                    (1-4)      (5-9)  (10-14)  (15-19)   (20-27)     Memory Impairment & Memory loss (Mini-Cog Screening Tool) Advanced dementia and/or Parkinson's Reconsider surgery     [x] 0     []1     []2     []3     []4     [] 5     Physical Conditioning (Modified AM-PAC Per Physical Therapy at [facility]) Unable to " ambulate on day of surgery Delay surgery and re-evaluate  Pre-Rehabilitation   (PT evaluation)       []  0   []4       [x]8     []12        []16     []20       (<20%)   (<40%)   (<60%)   (<80% )    (>80%)     Home Environment/Caregiver support  (Per /Navigator Interview)    Availability of basic services and/or approprate assistance during post-operative period Delay surgery and re-evaluate  Safe home environment  Health   1 week post-surgery  Transportation  availability  Ability to obtain DME/Medications post-op    [x] 0     []1     []2     []3     []4     [] 5  [x] 0     []1     []2     []3     []4     [] 5  [x] 0     []1     []2     []3     []4     [] 5  [x] 0     []1     []2     []3     []4     [] 5         MD Contact:  Comments:  Total Score:  11

## 2023-11-13 NOTE — PRE ADMISSION SCREENING
JOINT CAMP ASSESSMENT    Name Nael Mistry   MRN [id number]    Age/Sex 78 y.o. male    Surgeon Finger   Joint Camp Date 11/13/2023   Surgery Date 11/27/23   Procedure Right hip arthroplasty   Insurance Payor: AETNA MANAGED MEDICARE / Plan: AETNA MEDICARE PLAN PPO / Product Type: Medicare Advantage /    Care Team Patient Care Team:  Hardeep Pearl MD as PCP - General (Family Medicine)    Pharmacy   [facility] - [address]  [address] [facility]  [place] [address]  Phone: [phone] Fax: [phone]    [facility] - [place], [place] - [address] [place] [address]  Phone: [phone] Fax: [phone]    [facility] - [place] - [address]  [address]  [place] [address]  Phone: [phone] Fax: [phone]    [facility] - [place], [place] - [address] [place], [place] [address] [facility] [address]  Phone: [phone] Fax: [phone]     AM-PAC Score   19   Risk Assessment Score 11     Past Medical History:   Diagnosis Date    Cancer 01/01/1992    colon    Cancer of bladder 2005    Hyperlipidemia     Pre-diabetes        Past Surgical History:   Procedure Laterality Date    BLADDER SURGERY      CATARACT EXTRACTION Bilateral 02/2023    COLONOSCOPY      COLONOSCOPY N/A 11/24/2020    Procedure: COLONOSCOPY;  Surgeon: Andrew Horan MD;  [facility]: [facility];  Service: Endoscopy;  Laterality: N/A;    EYE SURGERY      HERNIA REPAIR      left inguinal          Home Enviroment     Living Arrangement: Lives with spouse  Home Environment: 1-story house/ trailer, tub-shower  Home Safety Concerns: not applicable    DISCHARGE CAREGIVER/SUPPORT SYSTEM     Identified post-op caregiver: Patient has spouse / significant other.  Patient's caregiver(s) will be able to provide physical assistance. Patient will have someone to assist overnight.      Caregiver  present at pre-op interview:  Yes      PRE-OPERATIVE FUNCTIONAL STATUS     Employment: Retired [employer]    Pre-op Functional Status: Patient is independent with mobility/ambulation, transfers, ADL's, IADL's.    Use of assistive device for ambulation: none  ADL: self care  ADL Limitations: difficulty with walking  Medical Restrictions: Unstable ambulation and Decreased range of motions in extremities    POTENTIAL BARRIERS TO DISCHARGE/POTENTIAL POST-OP COMPLICATIONS   DM, kidney disease    Video: watched at home.  Wife wants to watch video will resend because she cannot find it in my [facility].reviewed educational pamphlet  DISCHARGE PLAN     Expected LOS of 1 days or less for joint replacement discussed with patient. We also discussed a discharge path of HH for approximately two weeks with a transition to outpatient PT on the third week given no post-op complications.  Pt amenable to same day discharge but has reservations    Patient in agreement with discharge plan: Yes    Discharge to: Discharge home with home health (PT/OT) x2 weeks with transition to outpatient PT     HH:  [facility] in [place] Patient disclosure form completed and sent to case management for upload to the medical record.      OP PT: [facility] [address] in [facility] DME: none    Needed DME at D/C: rolling walker he put a elevated toilet seat on toilet and does NOT want BSC - it will not fit in bathroom he has figured out how to use toilet and bath.     Rx: Per Dr. Jiménez at discharge     Meds to Beds: Yes  Patient expected to discharge on Aspirin 81mg by mouth twice daily for DVT prophylaxis. Coumadin Clinic Needed: No

## 2023-11-14 ENCOUNTER — PATIENT MESSAGE (OUTPATIENT)
Dept: ORTHOPEDICS | Facility: CLINIC | Age: 78
End: 2023-11-14
Payer: MEDICARE

## 2023-11-14 ENCOUNTER — TELEPHONE (OUTPATIENT)
Dept: ORTHOPEDICS | Facility: CLINIC | Age: 78
End: 2023-11-14

## 2023-11-14 DIAGNOSIS — M16.11 PRIMARY OSTEOARTHRITIS OF RIGHT HIP: Primary | ICD-10-CM

## 2023-11-15 ENCOUNTER — PATIENT OUTREACH (OUTPATIENT)
Dept: ADMINISTRATIVE | Facility: HOSPITAL | Age: 78
End: 2023-11-15
Payer: MEDICARE

## 2023-11-15 NOTE — LETTER
AUTHORIZATION FOR RELEASE OF   CONFIDENTIAL INFORMATION    Dear Dr. Velazquez,    We are seeing Nael Mistry, date of birth 1945, in the clinic at [facility]. Hardeep Pearl MD is the patient's PCP. Nael Mistry has an outstanding lab/procedure at the time we reviewed his chart. In order to help keep his health information updated, he has authorized us to request the following medical record(s):        (  )  MAMMOGRAM                                      (  )  COLONOSCOPY      (  )  PAP SMEAR                                          (  )  OUTSIDE LAB RESULTS     (  )  DEXA SCAN                                          (X)  EYE EXAM            (  )  FOOT EXAM                                          (  )  ENTIRE RECORD     (  )  OUTSIDE IMMUNIZATIONS                 (  )  _______________        ATTN: BONILLA      Please fax records to Hardeep Pearl MD, [phone]     If you have any questions, please contact [facility] at [phone].           Patient Name: Nael Mistry  : 1945  Patient Phone #: [phone]

## 2023-11-15 NOTE — PROGRESS NOTES
Health Maintenance Due   Topic Date Due    Eye Exam  Never done    TETANUS VACCINE  Never done    RSV Vaccine (Age 60+) (1 - 1-dose 60+ series) Never done    COVID-19 Vaccine (4 - 2023-24 season) 09/01/2023     Immunizations - reviewed and updated   Care Everywhere - triggered   Care Teams - updated   Outreach - [facility] sent to Dr. Velazquez for most recent diabetic eye exam records.

## 2023-11-22 ENCOUNTER — PATIENT OUTREACH (OUTPATIENT)
Dept: ADMINISTRATIVE | Facility: HOSPITAL | Age: 78
End: 2023-11-22
Payer: MEDICARE

## 2023-11-22 DIAGNOSIS — Z96.641 S/P TOTAL RIGHT HIP ARTHROPLASTY: Primary | ICD-10-CM

## 2023-11-22 RX ORDER — OXYCODONE AND ACETAMINOPHEN 7.5; 325 MG/1; MG/1
1 TABLET ORAL EVERY 6 HOURS PRN
Qty: 28 TABLET | Refills: 0 | Status: SHIPPED | OUTPATIENT
Start: 2023-11-22 | End: 2023-12-01

## 2023-11-22 RX ORDER — ONDANSETRON 4 MG/1
4 TABLET, ORALLY DISINTEGRATING ORAL EVERY 6 HOURS PRN
Qty: 10 TABLET | Refills: 0 | Status: SHIPPED | OUTPATIENT
Start: 2023-11-22

## 2023-11-22 RX ORDER — DOCUSATE SODIUM 100 MG/1
100 CAPSULE, LIQUID FILLED ORAL 2 TIMES DAILY
Qty: 60 CAPSULE | Refills: 0 | Status: SHIPPED | OUTPATIENT
Start: 2023-11-22 | End: 2023-12-24

## 2023-11-22 RX ORDER — CELECOXIB 200 MG/1
200 CAPSULE ORAL 2 TIMES DAILY
Qty: 60 CAPSULE | Refills: 0 | Status: SHIPPED | OUTPATIENT
Start: 2023-11-22 | End: 2023-12-24

## 2023-11-22 RX ORDER — GABAPENTIN 300 MG/1
300 CAPSULE ORAL 2 TIMES DAILY
Qty: 60 CAPSULE | Refills: 0 | Status: SHIPPED | OUTPATIENT
Start: 2023-11-22 | End: 2023-12-24

## 2023-11-22 RX ORDER — NAPROXEN SODIUM 220 MG/1
81 TABLET, FILM COATED ORAL 2 TIMES DAILY WITH MEALS
Qty: 60 TABLET | Refills: 0 | Status: SHIPPED | OUTPATIENT
Start: 2023-11-22 | End: 2024-01-09

## 2023-11-22 NOTE — PROGRESS NOTES
Health Maintenance Due   Topic Date Due    TETANUS VACCINE  Never done    RSV Vaccine (Age 60+ and Pregnant patients) (1 - 1-dose 60+ series) Never done    COVID-19 Vaccine (4 - 2023-24 season) 09/01/2023     Immunizations - reviewed and updated   Care Everywhere - triggered   Care Teams - updated   Outreach - Received eye exam done 4/6/2023, uploaded to . HM updated.

## 2023-11-26 ENCOUNTER — ANESTHESIA EVENT (OUTPATIENT)
Dept: SURGERY | Facility: HOSPITAL | Age: 78
End: 2023-11-26
Payer: MEDICARE

## 2023-11-27 ENCOUNTER — ANESTHESIA (OUTPATIENT)
Dept: SURGERY | Facility: HOSPITAL | Age: 78
End: 2023-11-27
Payer: MEDICARE

## 2023-11-27 ENCOUNTER — HOSPITAL ENCOUNTER (OUTPATIENT)
Facility: HOSPITAL | Age: 78
Discharge: HOME | End: 2023-11-27
Attending: ORTHOPAEDIC SURGERY | Admitting: ORTHOPAEDIC SURGERY
Payer: MEDICARE

## 2023-11-27 DIAGNOSIS — M16.11 PRIMARY OSTEOARTHRITIS OF RIGHT HIP: Primary | ICD-10-CM

## 2023-11-27 PROCEDURE — 97116 GAIT TRAINING THERAPY: CPT

## 2023-11-27 PROCEDURE — 76942 PENG SS: ICD-10-PCS | Mod: 26,,, | Performed by: ANESTHESIOLOGY

## 2023-11-27 PROCEDURE — 64450 NJX AA&/STRD OTHER PN/BRANCH: CPT | Mod: 59,RT,, | Performed by: ANESTHESIOLOGY

## 2023-11-27 PROCEDURE — 97535 SELF CARE MNGMENT TRAINING: CPT

## 2023-11-27 PROCEDURE — 94799 UNLISTED PULMONARY SVC/PX: CPT | Mod: XB

## 2023-11-27 PROCEDURE — 63600175 PHARM REV CODE 636 W HCPCS: Performed by: ORTHOPAEDIC SURGERY

## 2023-11-27 PROCEDURE — 25000003 PHARM REV CODE 250: Performed by: ANESTHESIOLOGY

## 2023-11-27 PROCEDURE — 71000033 HC RECOVERY, INTIAL HOUR: Performed by: ORTHOPAEDIC SURGERY

## 2023-11-27 PROCEDURE — 64450 NJX AA&/STRD OTHER PN/BRANCH: CPT | Mod: 59 | Performed by: ANESTHESIOLOGY

## 2023-11-27 PROCEDURE — 37000009 HC ANESTHESIA EA ADD 15 MINS: Performed by: ORTHOPAEDIC SURGERY

## 2023-11-27 PROCEDURE — 36000711: Performed by: ORTHOPAEDIC SURGERY

## 2023-11-27 PROCEDURE — 27201423 OPTIME MED/SURG SUP & DEVICES STERILE SUPPLY: Performed by: ORTHOPAEDIC SURGERY

## 2023-11-27 PROCEDURE — 71000039 HC RECOVERY, EACH ADD'L HOUR: Performed by: ORTHOPAEDIC SURGERY

## 2023-11-27 PROCEDURE — 64450 PENG SS: ICD-10-PCS | Mod: 59,RT,, | Performed by: ANESTHESIOLOGY

## 2023-11-27 PROCEDURE — 27130 TOTAL HIP ARTHROPLASTY: CPT | Mod: RT,,, | Performed by: ORTHOPAEDIC SURGERY

## 2023-11-27 PROCEDURE — D9220A PRA ANESTHESIA: ICD-10-PCS | Mod: ANES,,, | Performed by: ANESTHESIOLOGY

## 2023-11-27 PROCEDURE — D9220A PRA ANESTHESIA: Mod: CRNA,,, | Performed by: NURSE ANESTHETIST, CERTIFIED REGISTERED

## 2023-11-27 PROCEDURE — 71000015 HC POSTOP RECOV 1ST HR: Performed by: ORTHOPAEDIC SURGERY

## 2023-11-27 PROCEDURE — D9220A PRA ANESTHESIA: ICD-10-PCS | Mod: CRNA,,, | Performed by: NURSE ANESTHETIST, CERTIFIED REGISTERED

## 2023-11-27 PROCEDURE — 27130 PR TOTAL HIP ARTHROPLASTY: ICD-10-PCS | Mod: RT,,, | Performed by: ORTHOPAEDIC SURGERY

## 2023-11-27 PROCEDURE — 37000008 HC ANESTHESIA 1ST 15 MINUTES: Performed by: ORTHOPAEDIC SURGERY

## 2023-11-27 PROCEDURE — 25000003 PHARM REV CODE 250: Performed by: NURSE ANESTHETIST, CERTIFIED REGISTERED

## 2023-11-27 PROCEDURE — 27200750 HC INSULATED NEEDLE/ STIMUPLEX: Performed by: ANESTHESIOLOGY

## 2023-11-27 PROCEDURE — D9220A PRA ANESTHESIA: Mod: ANES,,, | Performed by: ANESTHESIOLOGY

## 2023-11-27 PROCEDURE — 63600175 PHARM REV CODE 636 W HCPCS: Performed by: NURSE ANESTHETIST, CERTIFIED REGISTERED

## 2023-11-27 PROCEDURE — 63600175 PHARM REV CODE 636 W HCPCS: Performed by: ANESTHESIOLOGY

## 2023-11-27 PROCEDURE — 97165 OT EVAL LOW COMPLEX 30 MIN: CPT

## 2023-11-27 PROCEDURE — 36000710: Performed by: ORTHOPAEDIC SURGERY

## 2023-11-27 PROCEDURE — 71000016 HC POSTOP RECOV ADDL HR: Performed by: ORTHOPAEDIC SURGERY

## 2023-11-27 PROCEDURE — C1776 JOINT DEVICE (IMPLANTABLE): HCPCS | Performed by: ORTHOPAEDIC SURGERY

## 2023-11-27 PROCEDURE — 76942 ECHO GUIDE FOR BIOPSY: CPT | Mod: 26,,, | Performed by: ANESTHESIOLOGY

## 2023-11-27 PROCEDURE — 97161 PT EVAL LOW COMPLEX 20 MIN: CPT

## 2023-11-27 DEVICE — M-SPEC METAL FEMORAL HEAD 12/14 TAPER DIAMETER 36MM +5: Type: IMPLANTABLE DEVICE | Site: HIP | Status: FUNCTIONAL

## 2023-11-27 DEVICE — SUMMIT FEMORAL STEM 12/14 TAPER TAPER ED W/POROCOAT SIZE 3 HI 135MM
Type: IMPLANTABLE DEVICE | Site: HIP | Status: FUNCTIONAL
Brand: SUMMIT POROCOAT

## 2023-11-27 DEVICE — PINNACLE HIP SOLUTIONS ALTRX POLYETHYLENE ACETABULAR LINER +4 10 DEGREE 36MM ID 56MM OD
Type: IMPLANTABLE DEVICE | Site: HIP | Status: FUNCTIONAL
Brand: PINNACLE ALTRX

## 2023-11-27 DEVICE — PINNACLE GRIPTION ACETABULAR SHELL SECTOR 56MM OD
Type: IMPLANTABLE DEVICE | Site: HIP | Status: FUNCTIONAL
Brand: PINNACLE GRIPTION

## 2023-11-27 RX ORDER — PROPOFOL 10 MG/ML
VIAL (ML) INTRAVENOUS
Status: DISCONTINUED | OUTPATIENT
Start: 2023-11-27 | End: 2023-11-27

## 2023-11-27 RX ORDER — MUPIROCIN 20 MG/G
1 OINTMENT TOPICAL 2 TIMES DAILY
Status: CANCELLED | OUTPATIENT
Start: 2023-11-27 | End: 2023-12-02

## 2023-11-27 RX ORDER — CEFAZOLIN SODIUM 2 G/50ML
2 SOLUTION INTRAVENOUS
Status: COMPLETED | OUTPATIENT
Start: 2023-11-27 | End: 2023-11-27

## 2023-11-27 RX ORDER — HYDROCODONE BITARTRATE AND ACETAMINOPHEN 5; 325 MG/1; MG/1
1 TABLET ORAL EVERY 4 HOURS PRN
Status: CANCELLED | OUTPATIENT
Start: 2023-11-27

## 2023-11-27 RX ORDER — FENTANYL CITRATE 50 UG/ML
25 INJECTION, SOLUTION INTRAMUSCULAR; INTRAVENOUS EVERY 5 MIN PRN
Status: DISCONTINUED | OUTPATIENT
Start: 2023-11-27 | End: 2023-11-27 | Stop reason: HOSPADM

## 2023-11-27 RX ORDER — CEFAZOLIN SODIUM 1 G/50ML
1 SOLUTION INTRAVENOUS
Status: CANCELLED | OUTPATIENT
Start: 2023-11-27 | End: 2023-11-28

## 2023-11-27 RX ORDER — ONDANSETRON 2 MG/ML
4 INJECTION INTRAMUSCULAR; INTRAVENOUS ONCE AS NEEDED
Status: DISCONTINUED | OUTPATIENT
Start: 2023-11-27 | End: 2023-11-27 | Stop reason: HOSPADM

## 2023-11-27 RX ORDER — DEXAMETHASONE SODIUM PHOSPHATE 10 MG/ML
INJECTION INTRAMUSCULAR; INTRAVENOUS
Status: DISCONTINUED | OUTPATIENT
Start: 2023-11-27 | End: 2023-11-27

## 2023-11-27 RX ORDER — TRANEXAMIC ACID 100 MG/ML
INJECTION, SOLUTION INTRAVENOUS
Status: DISCONTINUED | OUTPATIENT
Start: 2023-11-27 | End: 2023-11-27

## 2023-11-27 RX ORDER — OXYCODONE HCL 10 MG/1
10 TABLET, FILM COATED, EXTENDED RELEASE ORAL ONCE
Status: COMPLETED | OUTPATIENT
Start: 2023-11-27 | End: 2023-11-27

## 2023-11-27 RX ORDER — BISACODYL 10 MG
10 SUPPOSITORY, RECTAL RECTAL DAILY
Status: CANCELLED | OUTPATIENT
Start: 2023-11-27 | End: 2023-11-30

## 2023-11-27 RX ORDER — CELECOXIB 100 MG/1
400 CAPSULE ORAL ONCE
Status: COMPLETED | OUTPATIENT
Start: 2023-11-27 | End: 2023-11-27

## 2023-11-27 RX ORDER — SODIUM CHLORIDE 0.9 % (FLUSH) 0.9 %
5 SYRINGE (ML) INJECTION
Status: DISCONTINUED | OUTPATIENT
Start: 2023-11-27 | End: 2023-11-27 | Stop reason: HOSPADM

## 2023-11-27 RX ORDER — NAPROXEN SODIUM 220 MG/1
81 TABLET, FILM COATED ORAL 2 TIMES DAILY
Status: DISCONTINUED | OUTPATIENT
Start: 2023-11-27 | End: 2023-11-27 | Stop reason: HOSPADM

## 2023-11-27 RX ORDER — BUPIVACAINE HYDROCHLORIDE 2.5 MG/ML
INJECTION, SOLUTION EPIDURAL; INFILTRATION; INTRACAUDAL
Status: DISCONTINUED | OUTPATIENT
Start: 2023-11-27 | End: 2023-11-27

## 2023-11-27 RX ORDER — MIDAZOLAM HYDROCHLORIDE 1 MG/ML
2 INJECTION INTRAMUSCULAR; INTRAVENOUS
Status: DISCONTINUED | OUTPATIENT
Start: 2023-11-27 | End: 2023-11-27 | Stop reason: HOSPADM

## 2023-11-27 RX ORDER — GLYCOPYRROLATE 0.2 MG/ML
INJECTION INTRAMUSCULAR; INTRAVENOUS
Status: DISCONTINUED | OUTPATIENT
Start: 2023-11-27 | End: 2023-11-27

## 2023-11-27 RX ORDER — FENTANYL CITRATE 50 UG/ML
25-200 INJECTION, SOLUTION INTRAMUSCULAR; INTRAVENOUS
Status: DISCONTINUED | OUTPATIENT
Start: 2023-11-27 | End: 2023-11-27 | Stop reason: HOSPADM

## 2023-11-27 RX ORDER — ONDANSETRON 2 MG/ML
INJECTION INTRAMUSCULAR; INTRAVENOUS
Status: DISCONTINUED | OUTPATIENT
Start: 2023-11-27 | End: 2023-11-27

## 2023-11-27 RX ORDER — OXYCODONE HYDROCHLORIDE 5 MG/1
5 TABLET ORAL ONCE AS NEEDED
Status: COMPLETED | OUTPATIENT
Start: 2023-11-27 | End: 2023-11-27

## 2023-11-27 RX ORDER — LIDOCAINE HYDROCHLORIDE 20 MG/ML
INJECTION INTRAVENOUS
Status: DISCONTINUED | OUTPATIENT
Start: 2023-11-27 | End: 2023-11-27

## 2023-11-27 RX ORDER — BUPIVACAINE HYDROCHLORIDE 7.5 MG/ML
INJECTION, SOLUTION EPIDURAL; RETROBULBAR
Status: DISCONTINUED | OUTPATIENT
Start: 2023-11-27 | End: 2023-11-27

## 2023-11-27 RX ORDER — DEXTROSE MONOHYDRATE AND SODIUM CHLORIDE 5; .45 G/100ML; G/100ML
INJECTION, SOLUTION INTRAVENOUS CONTINUOUS
Status: CANCELLED | OUTPATIENT
Start: 2023-11-27

## 2023-11-27 RX ORDER — KETAMINE HYDROCHLORIDE 100 MG/ML
INJECTION, SOLUTION INTRAMUSCULAR; INTRAVENOUS
Status: DISCONTINUED | OUTPATIENT
Start: 2023-11-27 | End: 2023-11-27

## 2023-11-27 RX ORDER — LIDOCAINE HYDROCHLORIDE 10 MG/ML
1 INJECTION, SOLUTION EPIDURAL; INFILTRATION; INTRACAUDAL; PERINEURAL ONCE
Status: DISCONTINUED | OUTPATIENT
Start: 2023-11-27 | End: 2023-11-27 | Stop reason: HOSPADM

## 2023-11-27 RX ORDER — ZOLPIDEM TARTRATE 5 MG/1
5 TABLET ORAL NIGHTLY PRN
Status: CANCELLED | OUTPATIENT
Start: 2023-11-27

## 2023-11-27 RX ORDER — PROPOFOL 10 MG/ML
VIAL (ML) INTRAVENOUS CONTINUOUS PRN
Status: DISCONTINUED | OUTPATIENT
Start: 2023-11-27 | End: 2023-11-27

## 2023-11-27 RX ORDER — FAMOTIDINE 20 MG/1
20 TABLET, FILM COATED ORAL 2 TIMES DAILY
Status: CANCELLED | OUTPATIENT
Start: 2023-11-27

## 2023-11-27 RX ORDER — ACETAMINOPHEN 500 MG
1000 TABLET ORAL
Status: COMPLETED | OUTPATIENT
Start: 2023-11-27 | End: 2023-11-27

## 2023-11-27 RX ADMIN — DEXAMETHASONE SODIUM PHOSPHATE 8 MG: 10 INJECTION, SOLUTION INTRAMUSCULAR; INTRAVENOUS at 10:11

## 2023-11-27 RX ADMIN — PROPOFOL 30 MG: 10 INJECTION, EMULSION INTRAVENOUS at 10:11

## 2023-11-27 RX ADMIN — GLYCOPYRROLATE 0.2 MG: 0.2 INJECTION INTRAMUSCULAR; INTRAVENOUS at 10:11

## 2023-11-27 RX ADMIN — FENTANYL CITRATE 50 MCG: 50 INJECTION, SOLUTION INTRAMUSCULAR; INTRAVENOUS at 09:11

## 2023-11-27 RX ADMIN — CEFAZOLIN SODIUM 2 G: 2 SOLUTION INTRAVENOUS at 10:11

## 2023-11-27 RX ADMIN — MIDAZOLAM HYDROCHLORIDE 2 MG: 1 INJECTION INTRAMUSCULAR; INTRAVENOUS at 09:11

## 2023-11-27 RX ADMIN — LIDOCAINE HYDROCHLORIDE 50 MG: 20 INJECTION, SOLUTION INTRAVENOUS at 10:11

## 2023-11-27 RX ADMIN — KETAMINE HYDROCHLORIDE 30 MG: 100 INJECTION, SOLUTION, CONCENTRATE INTRAMUSCULAR; INTRAVENOUS at 10:11

## 2023-11-27 RX ADMIN — SODIUM CHLORIDE, SODIUM GLUCONATE, SODIUM ACETATE, POTASSIUM CHLORIDE, MAGNESIUM CHLORIDE, SODIUM PHOSPHATE, DIBASIC, AND POTASSIUM PHOSPHATE: .53; .5; .37; .037; .03; .012; .00082 INJECTION, SOLUTION INTRAVENOUS at 06:11

## 2023-11-27 RX ADMIN — OXYCODONE HYDROCHLORIDE 5 MG: 5 TABLET ORAL at 01:11

## 2023-11-27 RX ADMIN — CELECOXIB 400 MG: 100 CAPSULE ORAL at 06:11

## 2023-11-27 RX ADMIN — FENTANYL CITRATE 25 MCG: 50 INJECTION, SOLUTION INTRAMUSCULAR; INTRAVENOUS at 11:11

## 2023-11-27 RX ADMIN — BUPIVACAINE HYDROCHLORIDE 1.6 ML: 7.5 INJECTION, SOLUTION EPIDURAL; RETROBULBAR at 10:11

## 2023-11-27 RX ADMIN — TRANEXAMIC ACID 1000 MG: 100 INJECTION, SOLUTION INTRAVENOUS at 10:11

## 2023-11-27 RX ADMIN — ONDANSETRON 4 MG: 2 INJECTION INTRAMUSCULAR; INTRAVENOUS at 10:11

## 2023-11-27 RX ADMIN — BUPIVACAINE HYDROCHLORIDE 30 ML: 2.5 INJECTION, SOLUTION EPIDURAL; INFILTRATION; INTRACAUDAL; PERINEURAL at 09:11

## 2023-11-27 RX ADMIN — OXYCODONE HYDROCHLORIDE 10 MG: 10 TABLET, FILM COATED, EXTENDED RELEASE ORAL at 06:11

## 2023-11-27 RX ADMIN — ACETAMINOPHEN 1000 MG: 500 TABLET, COATED ORAL at 06:11

## 2023-11-27 RX ADMIN — SODIUM CHLORIDE, SODIUM GLUCONATE, SODIUM ACETATE, POTASSIUM CHLORIDE, MAGNESIUM CHLORIDE, SODIUM PHOSPHATE, DIBASIC, AND POTASSIUM PHOSPHATE: .53; .5; .37; .037; .03; .012; .00082 INJECTION, SOLUTION INTRAVENOUS at 11:11

## 2023-11-27 RX ADMIN — PROPOFOL 50 MCG/KG/MIN: 10 INJECTION, EMULSION INTRAVENOUS at 10:11

## 2023-11-27 RX ADMIN — FENTANYL CITRATE 25 MCG: 50 INJECTION, SOLUTION INTRAMUSCULAR; INTRAVENOUS at 12:11

## 2023-11-27 NOTE — DISCHARGE INSTRUCTIONS
"Discharge Instructions: After Your Surgery/Procedure  Youve just had surgery. During surgery you were given medicine called anesthesia to keep you relaxed and free of pain. After surgery you may have some pain or nausea. This is common. Here are some tips for feeling better and getting well after surgery.     Stay on schedule with your medication.   Going home  Your doctor or nurse will show you how to take care of yourself when you go home. He or she will also answer your questions. Have an adult family member or friend drive you home.      For your safety we recommend these precaution for the first 24 hours after your procedure:  Do not drive or use heavy equipment.  Do not make important decisions or sign legal papers.  Do not drink alcohol.  Have someone stay with you, if needed. He or she can watch for problems and help keep you safe.  Your concentration, balance, coordination, and judgement may be impaired for many hours after anesthesia.  Use caution when ambulating or standing up.     You may feel weak and "washed out" after anesthesia and surgery.      Subtle residual effects of general anesthesia or sedation with regional / local anesthesia can last more than 24 hours.  Rest for the remainder of the day or longer if your Doctor/Surgeon has advised you to do so.  Although you may feel normal within the first 24 hours, your reflexes and mental ability may be impaired without you realizing it.  You may feel dizzy, lightheaded or sleepy for 24 hours or longer.      Be sure to go to all follow-up visits with your doctor. And rest after your surgery for as long as your doctor tells you to.  Coping with pain  If you have pain after surgery, pain medicine will help you feel better. Take it as told, before pain becomes severe. Also, ask your doctor or pharmacist about other ways to control pain. This might be with heat, ice, or relaxation. And follow any other instructions your surgeon or nurse gives you.  Tips " for taking pain medicine  To get the best relief possible, remember these points:  Pain medicines can upset your stomach. Taking them with a little food may help.  Most pain relievers taken by mouth need at least 20 to 30 minutes to start to work.  Taking medicine on a schedule can help you remember to take it. Try to time your medicine so that you can take it before starting an activity. This might be before you get dressed, go for a walk, or sit down for dinner.  Constipation is a common side effect of pain medicines. Call your doctor before taking any medicines such as laxatives or stool softeners to help ease constipation. Also ask if you should skip any foods. Drinking lots of fluids and eating foods such as fruits and vegetables that are high in fiber can also help. Remember, do not take laxatives unless your surgeon has prescribed them.  Drinking alcohol and taking pain medicine can cause dizziness and slow your breathing. It can even be deadly. Do not drink alcohol while taking pain medicine.  Pain medicine can make you react more slowly to things. Do not drive or run machinery while taking pain medicine.  Your health care provider may tell you to take acetaminophen to help ease your pain. Ask him or her how much you are supposed to take each day. Acetaminophen or other pain relievers may interact with your prescription medicines or other over-the-counter (OTC) drugs. Some prescription medicines have acetaminophen and other ingredients. Using both prescription and OTC acetaminophen for pain can cause you to overdose. Read the labels on your OTC medicines with care. This will help you to clearly know the list of ingredients, how much to take, and any warnings. It may also help you not take too much acetaminophen. If you have questions or do not understand the information, ask your pharmacist or health care provider to explain it to you before you take the OTC medicine.  Managing nausea  Some people have an  upset stomach after surgery. This is often because of anesthesia, pain, or pain medicine, or the stress of surgery. These tips will help you handle nausea and eat healthy foods as you get better. If you were on a special food plan before surgery, ask your doctor if you should follow it while you get better. These tips may help:  Do not push yourself to eat. Your body will tell you when to eat and how much.  Start off with clear liquids and soup. They are easier to digest.  Next try semi-solid foods, such as mashed potatoes, applesauce, and gelatin, as you feel ready.  Slowly move to solid foods. Dont eat fatty, rich, or spicy foods at first.  Do not force yourself to have 3 large meals a day. Instead eat smaller amounts more often.  Take pain medicines with a small amount of solid food, such as crackers or toast, to avoid nausea.     Call your surgeon if  You still have pain an hour after taking medicine. The medicine may not be strong enough.  You feel too sleepy, dizzy, or groggy. The medicine may be too strong.  You have side effects like nausea, vomiting, or skin changes, such as rash, itching, or hives.       If you have obstructive sleep apnea  You were given anesthesia medicine during surgery to keep you comfortable and free of pain. After surgery, you may have more apnea spells because of this medicine and other medicines you were given. The spells may last longer than usual.   At home:  Keep using the continuous positive airway pressure (CPAP) device when you sleep. Unless your health care provider tells you not to, use it when you sleep, day or night. CPAP is a common device used to treat obstructive sleep apnea.  Talk with your provider before taking any pain medicine, muscle relaxants, or sedatives. Your provider will tell you about the possible dangers of taking these medicines.  © 2471-3781 [employer]. [address] [place] [address], [place] [address]. All rights reserved. This information is  not intended as a substitute for professional medical care. Always follow your healthcare professional's instructions.          Using an Incentive Spirometer    An incentive spirometer is a device that helps you do deep breathing exercises. These exercises expand your lungs, aid in circulation, and help prevent pneumonia. Deep breathing exercises also help you breathe better and improve the function of your lungs by:  Keeping your lungs clear  Strengthening your breathing muscles  Helping prevent respiratory complications or problems  The incentive spirometer gives you a way to take an active part in recover. A nurse or therapist will teach you breathing exercises. To do these exercises, you will breathe in through your mouth and not your nose. The incentive spirometer only works correctly if you breathe in through your mouth.  Steps to clear lungs  Step 1. Exhale normally. Then, inhale normally.  Relax and breathe out.  Step 2. Place your lips tightly around the mouthpiece.  Make sure the device is upright and not tilted.  Step 3. Inhale as much air as you can through the mouthpiece (don't breath through your nose).  Inhale slowly and deeply.  Hold your breath long enough to keep the balls or disk raised for at least 3 to 5 seconds, or as instructed by your healthcare provider.  Some spirometers have an indicator to let you know that you are breathing in too fast. If the indicator goes off, breathe in more slowly.  Step 4. Repeat the exercise regularly.  Do this exercise every hour while you're awake, or as instructed by your healthcare provider.  If you were taught deep breathing and coughing exercises, do them regularly as instructed by your healthcare provider.           Post op instructions for prevention of DVT  What is deep vein thrombosis?  Deep vein thrombosis (DVT) is the medical term for blood clots in the deep veins of the leg.  These blood clots can be dangerous.  A DVT can block a blood vessel and keep  blood from getting where it needs to go.  Another problem is that the clot can travel to other parts of the body such as the lungs.  A clot that travels to the lungs is called a pulmonary embolus (PE) and can cause serious problems with breathing which can lead to death.  Am I at risk for DVT/PE?  If you are not very active, you are at risk of DVT.  Anyone confined to bed, sitting for long periods of time, recovering from surgery, etc. increases the risk of DVT.  Other risk factors are cancer diagnosis, certain medications, estrogen replacement in any form,older age, obesity, pregnancy, smoking, history of clotting disorders, and dehydration.  How will I know if I have a DVT?  Swelling in the lower leg  Pain  Warmth, redness, hardness or bulging of the vein  If you have any of these symptoms, call your doctors office right away.  Some people will not have any symptoms until the clot moves to the lungs.  What are the symptoms of a PE?  Panting, shortness of breath, or trouble breathing  Sharp, knife-like chest pain when you breathe  Coughing or coughing up blood  Rapid heartbeat  If you have any of these symptoms or get worse quickly, call 911 for emergency treatment.  How can I prevent a DVT?  Avoid long periods of inactivity and dont cross your legs--get up and walk around every hour or so.  Stay active--walking after surgery is highly encouraged.  This means you should get out of the house and walk in the neighborhood.  Going up and down stairs will not impair healing (unless advised against such activity by your doctor).    Drink plenty of noncaffeinated, nonalcoholic fluids each day to prevent dehydration.  Wear special support stockings, if they have been advised by your doctor.  If you travel, stop at least once an hour and walk around.  Avoid smoking (assistance with stopping is available through your healthcare provider)  Always notify your doctor if you are not able to follow the post operative  instructions that are given to you at the time of discharge.  It may be necessary to prescribe one of the medications available to prevent DVT.          We hope your stay was comfortable as you heal now, mend and rest.    For we have enjoyed taking care of you by giving your our best.    And as you get better, by regaining your health and strength;   We count it as a privilege to have served you and hope your time at [facility] was well spent.      Thank  You!!!

## 2023-11-27 NOTE — OP NOTE
[facility]  Surgery Department  Operative Note    SUMMARY     Date of Procedure: 11/27/2023     Procedure:  Right total hip arthroplasty    Surgeon(s) and Role:     * Ian Jiménez MD - Primary    Assisting Surgeon:  Charles    Pre-Operative Diagnosis: Primary osteoarthritis of right hip [M16.11]    Post-Operative Diagnosis: Post-Op Diagnosis Codes:     * Primary osteoarthritis of right hip [M16.11]    Anesthesia: Spinal    Intraoperative Findings:  Severe bone-on-bone arthritis right hip    Description of the Findings of the Procedure:  Patient placed on the operative table in supine position.  He was then turned to the lateral decubitus position where he was checked and padded well.  He was now prepped and draped in the usual sterile manner for surgery.  A posterior lateral approach was undertaken to the hip.  It was carried down sharply through the skin.  The bleeding was well meticulously controlled the deep fascia was incised in line with its fibers.  The sciatic nerve was palpated and protected.  The Charnley retractors were placed.  The short external rotators were taken down and tagged for later repair construction.  At this point we split the capsule.  The hip was dislocated.  There was a large effusion.  A preliminary head cut was made and the head was removed.  We now used the cookie cutter then the canal finer and then the lateralize her.  We reamed to a 3 and that gave us excellent chatter we now broached to a 3 and that gave us a very tight fill.  We pulsed and irrigated and turned our attention back to the acetabulum retractors were placed anteriorly and posteriorly.  We cleaned the foveal contents.  We now began reaming with 45 mm Reamer.  We reamed all the way up to 53 and that gave us good bleeding bone and a nice round cup.  We tapped our liner into position we had an excellent Press-Fit.  I tapped the poly liner into position in the construct snapped together well.   We turned our attention back to the femur.  I used the high offset femur because that most naturally matched his anatomy.  The +5 worked perfectly with perfect range of motion excellent stability and symmetric leg lengths.  I pulsed and irrigated.  We removed the trial components since placed the actual components into position.  The construct trialed perfectly.  We pulsed and irrigated.  The Charnley retractor was in good position.  We noted the sciatic nerve to be in good condition.  We closed the short external rotators with 2. FiberWire  We removed the Charnley retractor.  We closed the deep fascia with a 2.  FiberWire and a running Strattice fixed.  We also closed the subcu with 2-0 Vicryl and the skin with 4-0 Monocryl.  Sterile dressings were applied and the patient was noted to be in stable condition    Complications: No    Estimated Blood Loss (EBL): * No values recorded between 11/27/2023 11:10 AM and 11/27/2023 12:16 PM *           Implants:   Implant Name Type Inv. Item Serial No.  Lot No. LRB No. Used Action   CUP ACT PINNACLE SECTOR 56 TIT - [id number]  CUP ACT PINNACLE SECTOR 56 TIT  [employer]  Right 1 Implanted   LINER ALTRX +4 10DEG [id number] - [id number]  LINER ALTRX +4 10DEG [id number]  SYNTHES [id number] Right 1 Implanted   HEAD FEM ART/ANGELA 36MM +5 - [id number]  HEAD FEM ART/ANGELA 36MM +5  [employer] [id number] Right 1 Implanted   STEM FEM HIGH OFFSET SZ 3 - [id number]  STEM FEM HIGH OFFSET SZ 3  [employer] [id number] Right 1 Implanted       Specimens:   Specimen (24h ago, onward)      None                    Condition: Good    Disposition: PACU - hemodynamically stable.              Discharge Note    SUMMARY     Admit Date: 11/27/2023    Discharge Date and Time:  11/27/2023 12:16 PM    Hospital Course (synopsis of major diagnoses, care, treatment, and services provided during the course of the hospital stay): Uneventful      Final Diagnosis: Post-Op Diagnosis Codes:     * Primary osteoarthritis of  right hip [M16.11]    Disposition: Home or Self Care    Follow Up/Patient Instructions:     Medications:  Reconciled Home Medications:   Current Discharge Medication List        CONTINUE these medications which have NOT CHANGED    Details   ezetimibe-simvastatin 10-40 mg (VYTORIN) 10-40 mg per tablet TAKE 1 TABLET EVERY EVENING  Qty: 90 tablet, Refills: 2    Associated Diagnoses: Hypercholesteremia      aspirin 81 MG Chew Take 1 tablet (81 mg total) by mouth 2 (two) times daily with meals. Take as directed twice daily for DVT or deep vein thrombus prophylaxis status post joint replacement surgery.  Qty: 60 tablet, Refills: 0    Associated Diagnoses: S/P total right hip arthroplasty      celecoxib (CELEBREX) 200 MG capsule Take 1 capsule (200 mg total) by mouth 2 (two) times daily. Take as directed twice daily for inflammation postoperatively after joint replacement surgery.  Qty: 60 capsule, Refills: 0    Associated Diagnoses: S/P total right hip arthroplasty      diclofenac (VOLTAREN) 75 MG EC tablet Take 1 tablet (75 mg total) by mouth 2 (two) times daily as needed (pain). for pain  Qty: 180 tablet, Refills: 0    Comments: This prescription was filled on 1/10/2023. Any refills authorized will be placed on file.      docusate sodium (COLACE) 100 MG capsule Take 1 capsule (100 mg total) by mouth 2 (two) times daily.  Qty: 60 capsule, Refills: 0    Associated Diagnoses: S/P total right hip arthroplasty      gabapentin (NEURONTIN) 300 MG capsule Take 1 capsule (300 mg total) by mouth 2 (two) times daily. Take as directed twice daily after joint replacement surgery as an adjunct to pain medication.  Qty: 60 capsule, Refills: 0    Associated Diagnoses: S/P total right hip arthroplasty      ondansetron (ZOFRAN-ODT) 4 MG TbDL Take 1 tablet (4 mg total) by mouth every 6 (six) hours as needed (Postop nausea and vomiting or nausea associated with narcotic pain medication).  Qty: 10 tablet, Refills: 0    Associated Diagnoses:  S/P total right hip arthroplasty      ONETOUCH DELICA PLUS LANCET 33 gauge Misc 1 lancet by Misc.(Non-Drug; Combo Route) route 2 (two) times daily.  Qty: 100 each, Refills: 11    Associated Diagnoses: Type 2 diabetes mellitus with hyperlipidemia      ONETOUCH VERIO IQ METER Misc 1 kit by Misc.(Non-Drug; Combo Route) route 2 (two) times daily.  Qty: 1 each, Refills: 0    Associated Diagnoses: Type 2 diabetes mellitus with hyperlipidemia      ONETOUCH VERIO TEST STRIPS Strp 1 strip by Misc.(Non-Drug; Combo Route) route 2 (two) times daily.  Qty: 100 strip, Refills: 11    Associated Diagnoses: Type 2 diabetes mellitus with hyperlipidemia      oxyCODONE-acetaminophen (PERCOCET) 7.5-325 mg per tablet Take 1 tablet by mouth every 6 (six) hours as needed for Pain. Take as directed for postoperative pain after joint replacement surgery. Date of surgery:  November 27  Qty: 28 tablet, Refills: 0    Comments: Quantity prescribed more than 7 day supply? NO  Associated Diagnoses: S/P total right hip arthroplasty           STOP taking these medications       aspirin (ECOTRIN) 81 MG EC tablet Comments:   Reason for Stopping:             Discharge Procedure Orders   Diet general     Call MD for:  temperature >100.4     Call MD for:  persistent nausea and vomiting     Call MD for:  severe uncontrolled pain     Call MD for:  difficulty breathing, headache or visual disturbances     Call MD for:  redness, tenderness, or signs of infection (pain, swelling, redness, odor or green/yellow discharge around incision site)     Call MD for:  hives     Call MD for:  persistent dizziness or light-headedness     Call MD for:  extreme fatigue      Follow-up Information       [facility] -. Call in 1 day(s).    Specialties: Home Health Services, DME Provider  Why: As needed  Contact information:  [address]  [place] [address] [phone]

## 2023-11-27 NOTE — CARE UPDATE
11/27/23 0709   Patient Assessment/Suction   Level of Consciousness (AVPU) alert   PRE-TX-O2   Device (Oxygen Therapy) room air   Incentive Spirometer   $ Incentive Spirometer Charges preop instruction   Incentive Spirometer Predicted Level (mL) 1840   Administration (IS) mouthpiece utilized;instruction provided, initial   Number of Repetitions (IS) 3   Level Incentive Spirometer (mL) 3500   Patient Tolerance (IS) good;no adverse signs/symptoms present

## 2023-11-27 NOTE — TRANSFER OF CARE
"Anesthesia Transfer of Care Note    Patient: Nael Mistry    Procedure(s) Performed: Procedure(s) (LRB):  ARTHROPLASTY, HIP REPLACEMENT, RIGHT (Right)    Patient location: PACU    Anesthesia Type: spinal    Transport from OR: Transported from OR on 2-3 L/min O2 by NC with adequate spontaneous ventilation    Post pain: adequate analgesia    Post assessment: no apparent anesthetic complications    Post vital signs: stable    Level of consciousness: awake    Nausea/Vomiting: no nausea/vomiting    Complications: none    Transfer of care protocol was followed      Last vitals: Visit Vitals  BP (!) 143/67 (BP Location: Left arm, Patient Position: Lying)   Pulse 76   Temp 36.6 °C (97.9 °F) (Skin)   Resp 20   Ht 5' 10" (1.778 m)   Wt 102.5 kg (225 lb 15.5 oz)   SpO2 100%   BMI 32.42 kg/m²     "

## 2023-11-27 NOTE — ANESTHESIA POSTPROCEDURE EVALUATION
Anesthesia Post Evaluation    Patient: Nael Mistry    Procedure(s) Performed: Procedure(s) (LRB):  ARTHROPLASTY, HIP REPLACEMENT, RIGHT (Right)    Final Anesthesia Type: general      Patient location during evaluation: PACU  Patient participation: Yes- Able to Participate  Level of consciousness: awake and alert and oriented  Post-procedure vital signs: reviewed and stable  Pain management: adequate  Airway patency: patent  MARQUITA mitigation strategies: Multimodal analgesia  PONV status at discharge: No PONV  Anesthetic complications: no      Cardiovascular status: blood pressure returned to baseline  Respiratory status: unassisted, spontaneous ventilation and room air  Hydration status: euvolemic  Follow-up not needed.          Vitals Value Taken Time   /60 11/27/23 1237   Temp 36.6 11/27/23 1239   Pulse 78 11/27/23 1238   Resp 23 11/27/23 1238   SpO2 99 % 11/27/23 1238   Vitals shown include unvalidated device data.      No case tracking events are documented in the log.      Pain/John Score: Pain Rating Prior to Med Admin: 3 (11/27/2023  6:45 AM)

## 2023-11-27 NOTE — PT/OT/SLP EVAL
Physical Therapy Evaluation and Discharge Note    Patient Name:  Nael Mistry   MRN:  [id number]    Recommendations:     Discharge Recommendations: Low Intensity Therapy  Discharge Equipment Recommendations: walker, rolling   Barriers to discharge: None    Assessment:     Nael Mistry is a 78 y.o. male admitted with a medical diagnosis of right INDY .  At this time, patient is scheduled for discharge home and appears will be safe with mobility in the home.  Patient would benefit though from continued PT with HHPT to work on strengthening to further increase safety with mobility.       Recent Surgery: Procedure(s) (LRB):  ARTHROPLASTY, HIP REPLACEMENT, RIGHT (Right) Day of Surgery    Plan:     During this hospitalization, patient does not require further acute PT services.  Please re-consult if situation changes.      Subjective     Chief Complaint: weakness  Patient/Family Comments/goals: go home when ready  Pain/Comfort:  Pain Rating 1: 3/10  Location - Side 1: Right  Location - Orientation 1: lower  Location 1: hip  Pain Addressed 1: Reposition, Cessation of Activity  Pain Rating Post-Intervention 1: 5/10    Patients cultural, spiritual, Sabianist conflicts given the current situation:      Living Environment:  Currently lives with spouse in [address] home.  Prior to admission, patients level of function was independent.  Equipment used at home: none.  DME owned (not currently used): none.  Upon discharge, patient will have assistance from family.    Objective:     Communicated with nurse prior to session.  Patient found supine with cryotherapy, hip abduction pillow upon PT entry to room.    General Precautions: Standard, fall    Orthopedic Precautions:RLE weight bearing as tolerated, RLE posterior precautions   Braces:    Respiratory Status: Room air    Exams:  RLE ROM: hip not tested, knee and ankle WFL  RLE Strength: hip not tested, knee and ankle 4+/5 overall  LLE ROM: WFL  LLE Strength:  WNL    Functional Mobility:  Bed Mobility:     Supine to Sit: stand by assistance  Transfers:     Sit to Stand:  contact guard assistance with rolling walker  Gait: x 200 feet rW  CGA    AM-PAC 6 CLICK MOBILITY  Total Score:20       Treatment and Education:  Gait training x 200 feet, x 250 feet RW cGA, x 200 feet, x 300 feet RW SBA  Up/down 1 4 inch step x 4 times RW CGA    AM-PAC 6 CLICK MOBILITY  Total Score:20     Patient left up in chair with call button in reach, nurse notified, and family present.    GOALS:   Multidisciplinary Problems       Physical Therapy Goals       Not on file                    History:     Past Medical History:   Diagnosis Date    Cancer 01/01/1992    colon    Cancer of bladder 2005    Hyperlipidemia     Pre-diabetes        Past Surgical History:   Procedure Laterality Date    BLADDER SURGERY      CATARACT EXTRACTION Bilateral 02/2023    COLONOSCOPY      COLONOSCOPY N/A 11/24/2020    Procedure: COLONOSCOPY;  Surgeon: Andrew Horan MD;  Location: [facility];  Service: Endoscopy;  Laterality: N/A;    EYE SURGERY      HERNIA REPAIR      left inguinal        Time Tracking:     PT Received On: 11/27/23  PT Start Time: 1550     PT Stop Time: 1626  PT Total Time (min): 36 min     Billable Minutes: Evaluation 20 and Gait Training 16        11/27/2023

## 2023-11-27 NOTE — PLAN OF CARE
PT at bedside.      1522--OT at bedside.      1551--PT at bedside again.      1600--Pt ambulating in hallway per PT.      1636--OT at bedside again.

## 2023-11-27 NOTE — ANESTHESIA PROCEDURE NOTES
Spinal    Diagnosis: right hip  Patient location during procedure: OR  Start time: 11/27/2023 10:14 AM  Timeout: 11/27/2023 10:13 AM  End time: 11/27/2023 10:16 AM    Staffing  Authorizing Provider: Blanco Jordan MD  Performing Provider: Blanco Jordan MD    Staffing  Performed by: Blanco Jordan MD  Authorized by: Blanco Jordan MD    Preanesthetic Checklist  Completed: patient identified, IV checked, site marked, risks and benefits discussed, surgical consent, monitors and equipment checked, pre-op evaluation and timeout performed  Spinal Block  Patient position: sitting  Prep: ChloraPrep  Patient monitoring: heart rate, cardiac monitor and continuous pulse ox  Approach: midline  Location: L4-5  Injection technique: single shot  CSF Fluid: clear free-flowing CSF  Needle  Needle type: pencil-tip   Needle gauge: 25 G  Needle length: 3.5 in  Additional Documentation: incremental injection, negative aspiration for heme and no paresthesia on injection  Needle localization: anatomical landmarks  Assessment  Ease of block: easy  Patient's tolerance of the procedure: no complaints and comfortable throughout block  Medications:    Medications: bupivacaine (pf) (MARCAINE) injection 0.75% - Intraspinal   1.6 mL - 11/27/2023 10:16:00 AM

## 2023-11-27 NOTE — H&P
CC/Indication for Procedure: 78 y.o. male with Primary osteoarthritis of right hip [M16.11].    Patient scheduled for [place] TOTAL HIP ARTHROPLASTY [[id number]] (ARTHROPLASTY, HIP REPLACEMENT, RIGHT)  [place] TOTAL HIP ARTHROPLASTY [[id number]].    Past Medical History:   Diagnosis Date    Cancer 01/01/1992    colon    Cancer of bladder 2005    Hyperlipidemia     Pre-diabetes      Past Surgical History:   Procedure Laterality Date    BLADDER SURGERY      CATARACT EXTRACTION Bilateral 02/2023    COLONOSCOPY      COLONOSCOPY N/A 11/24/2020    Procedure: COLONOSCOPY;  Surgeon: Andrew Horan MD;  Location: [facility];  Service: Endoscopy;  Laterality: N/A;    EYE SURGERY      HERNIA REPAIR      left inguinal      Family History   Problem Relation Age of Onset    Alzheimer's disease Mother     Dementia Mother     Cancer Father         prostate     Heart disease Father     No Known Problems Brother      Social History     Socioeconomic History    Marital status:    Occupational History    Occupation: retired [employer]    Tobacco Use    Smoking status: Never    Smokeless tobacco: Never   Substance and Sexual Activity    Alcohol use: Yes     Alcohol/week: 5.0 standard drinks of alcohol     Types: 5 Cans of beer per week     Comment: 4-5 beer per week     Drug use: No    Sexual activity: Yes     Partners: Female     Birth control/protection: None       Review of patient's allergies indicates:  No Known Allergies      Current Facility-Administered Medications:     acetaminophen tablet 1,000 mg, 1,000 mg, Oral, Once Pre-Op, Mickey Gutierrez MD    cefazolin (ANCEF) 2 gram in dextrose 5% 50 mL IVPB (premix), 2 g, Intravenous, On Call Procedure, Finger, MD Ian    celecoxib capsule 400 mg, 400 mg, Oral, Once, Mickey Gutierrez MD    electrolyte-S (ISOLYTE), , Intravenous, Continuous, Mickey Gutierrez MD    fentaNYL 50 mcg/mL injection  mcg,  mcg, Intravenous, PRN, Mickey Gutierrez MD    LIDOcaine (PF) 10 mg/ml (1%)  injection 10 mg, 1 mL, Intradermal, Once, Mickey Gutierrez MD    midazolam (VERSED) 1 mg/mL injection 2 mg, 2 mg, Intravenous, PRN, Mickey Gutierrez MD    oxyCODONE 12 hr tablet 10 mg, 10 mg, Oral, Once, Mickey Gutierrez MD    tranexamic acid (CYKLOKAPRON) 1,000 mg in sodium chloride 0.9 % 100 mL IVPB (MB+), 1,000 mg, Intravenous, On Call Procedure, FingerIan MD    ROS:    Denies chest pain or palpitations  Denies shortness of breath  Denies fevers or chills  Denies chest pain  Denies abdominal pain    PE:    General Appearance: Well nourished  Orientation: Oriented to time, place, person  Mental Status: Alert  Heart: RRR  Lungs: CTA  Abdomen: Soft and non-tender    Anesthesia/Surgery risks, benefits and alternative options discussed and understood by patient/family.    This note was created using Dragon voice recognition software that occasionally misinterpreted phrases or words.

## 2023-11-27 NOTE — PT/OT/SLP EVAL
Occupational Therapy   Evaluation and Discharge Note    Name: Nael Mistry  MRN: [id number]  Admitting Diagnosis: <principal problem not specified>  Recent Surgery: Procedure(s) (LRB):  ARTHROPLASTY, HIP REPLACEMENT, RIGHT (Right) Day of Surgery    Recommendations:     Discharge Recommendations: Low Intensity Therapy  Discharge Equipment Recommendations: bath bench  Barriers to discharge:  None    Assessment:     Nael Mistry is a 78 y.o. male with a medical diagnosis of R INDY. Pt was agreeable to participate in OT. Pt was given education on hip precautions including no hip flexion greater than 90 degrees, no IR of hip and no adduction of hip. Pt verbalized understanding. Pt required CGA and verbal cues for hand placement with sit <> stand transfer using a RW. Pt required set up A with UBD. Pt was given instruction and demonstration of a reacher to increase his independence with LBD. Pt verbalized understanding and required max A to don his pants. Pt declined instruction of a dressing stick and sock aid. Pt was given education on use of DME including a raised toilet seat and a bath bench to maintain precautions during functional transfers. Pt is scheduled for discharge today and would benefit from skilled home health OT services to increase his independence with ADLs and functional transfer skills to return to his PLOF. At this time, patient does not require further acute OT services.     Plan:     During this hospitalization, patient does not require further acute OT services.  Please re-consult if situation changes.      Subjective     Chief Complaint: None stated  Patient/Family Comments/goals: Patient agreed to participate in OT.    Occupational Profile:  Living Environment: Pt lives with his wife.   Previous level of function: Independent  Equipment Used at home: none  Assistance upon Discharge: Pt will have assistance from his wife.    Pain/Comfort:  Pain Rating 1: 0/10    Patients cultural,  spiritual, Buddhism conflicts given the current situation: yes    Objective:     Communicated with: nurse prior to session.  Patient found HOB elevated upon OT entry to room.    General Precautions: Standard, fall  Orthopedic Precautions: R LE weight bearing as tolerated, R LE posterior precautions  Braces: Hip abduction brace  Respiratory Status: Room air     Occupational Performance:    Functional Mobility/Transfers:  Patient completed Sit <> Stand Transfer with contact guard assistance with rolling walker     Activities of Daily Living:  Upper Body Dressing: set up A  Lower Body Dressing: maximal assistance    Cognitive/Visual Perceptual:  Cognitive/Psychosocial Skills:  -       Oriented to: Person, Place, Time, and Situation   -       Follows Commands/attention: Follows multistep commands  -       Communication: clear/fluent    Physical Exam:  Upper Extremity Range of Motion:  -       Right Upper Extremity: WFL  -       Left Upper Extremity: WFL  Upper Extremity Strength: -       Right Upper Extremity: WFL  -       Left Upper Extremity: WFL    AMPAC 6 Click ADL:  AMPAC Total Score: 18    Treatment & Education:  Educated on Posterior hip precautions - patient recalled hip precautions   Educated on use of a reacher for LB dressing   Educated on safety with functional mobility; hand placement to ensure safe transfers to various surfaces in prep for ADLs    Patient left up in chair with all lines intact, nurse notified and patient's wife present.    GOALS:   Multidisciplinary Problems       Occupational Therapy Goals       Not on file                    History:     Past Medical History:   Diagnosis Date    Cancer 01/01/1992    colon    Cancer of bladder 2005    Hyperlipidemia     Pre-diabetes          Past Surgical History:   Procedure Laterality Date    BLADDER SURGERY      CATARACT EXTRACTION Bilateral 02/2023    COLONOSCOPY      COLONOSCOPY N/A 11/24/2020    Procedure: COLONOSCOPY;  Surgeon: Andrew Horan MD;   Location: [facility];  Service: Endoscopy;  Laterality: N/A;    EYE SURGERY      HERNIA REPAIR      left inguinal        Time Tracking:     OT Date of Treatment: 11/27/23  OT Start Time: 1520  OT Stop Time: 1545  OT Start Time: 1635  OT Stop Time: 1650  OT Total Time (min): 40 min    Billable Minutes:Evaluation 15  Self Care/Home Management 25    11/27/2023

## 2023-11-27 NOTE — ANESTHESIA PROCEDURE NOTES
WINSTON STALLINGS    Patient location during procedure: pre-op   Block not for primary anesthetic.  Reason for block: at surgeon's request and post-op pain management   Post-op Pain Location: right hip   Start time: 11/27/2023 9:31 AM  Timeout: 11/27/2023 9:30 AM   End time: 11/27/2023 9:34 AM    Staffing  Authorizing Provider: Blanco Jordan MD  Performing Provider: Blanco Jordan MD    Staffing  Other anesthesia staff: Mark Khan Jr., MD  Performed by: Blanco Jordan MD  Authorized by: Blanco Jordan MD    Preanesthetic Checklist  Completed: patient identified, IV checked, site marked, risks and benefits discussed, surgical consent, monitors and equipment checked, pre-op evaluation and timeout performed  Peripheral Block  Patient position: supine  Prep: ChloraPrep  Patient monitoring: heart rate, continuous pulse ox and cardiac monitor  Block type: PENG  Laterality: right  Injection technique: single shot  Needle  Needle type: Stimuplex   Needle gauge: 21 G  Needle length: 4 in  Needle localization: ultrasound guidance   -ultrasound image captured on disc.  Assessment  Injection assessment: negative aspiration and negative parasthesia  Paresthesia pain: none  Heart rate change: no  Slow fractionated injection: yes  Pain Tolerance: comfortable throughout block and no complaints  Medications:    Medications: bupivacaine (pf) (MARCAINE) injection 0.25% - Perineural   30 mL - 11/27/2023 9:32:00 AM

## 2023-11-27 NOTE — PLAN OF CARE
VSS. NAD. Resp E/U. Calm and cooperative. Speech appropriate. Tolerating clear liquids. Denies nausea. Denies pain. IV patent. No swelling or redness. Dressing to Rt hip CDI. Ice applied. Abduction pillow in place. Brief placed on pt. Due to void. Family notified of patient status. All safety measures taken. Bed in low position. Bedrails up x2. Bed locked. Cleared by Anesthesia.

## 2023-11-27 NOTE — ANESTHESIA PREPROCEDURE EVALUATION
11/27/2023  Nael Mistry is a 78 y.o., male.      Pre-op Assessment          Review of Systems  Anesthesia Hx:  No problems with previous Anesthesia                Cardiovascular:  Exercise tolerance: good                                           Renal/:  Chronic Renal Disease        Kidney Function/Disease             Musculoskeletal:  Arthritis        Arthritis          Neurological:  Neurology Normal              Arthritis                           Endocrine:  Diabetes Hypothyroidism   Diabetes                   Hypothyroidism              Physical Exam  General: Well nourished    Airway:  Mallampati: II   Mouth Opening: Normal  TM Distance: Normal  Tongue: Normal  Neck ROM: Normal ROM    Dental:  Intact    Chest/Lungs:  Clear to auscultation, Normal Respiratory Rate        Anesthesia Plan  Type of Anesthesia, risks & benefits discussed:    Anesthesia Type: Spinal  Intra-op Monitoring Plan: Standard ASA Monitors  Post Op Pain Control Plan: multimodal analgesia, IV/PO Opioids PRN and peripheral nerve block  Induction:  IV  Informed Consent: Patient consented to blood products? Yes  ASA Score: 3  Day of Surgery Review of History & Physical: H&P Update referred to the surgeon/provider.    Ready For Surgery From Anesthesia Perspective.     .

## 2023-11-28 ENCOUNTER — OFFICE VISIT (OUTPATIENT)
Dept: ORTHOPEDICS | Facility: CLINIC | Age: 78
End: 2023-11-28
Payer: MEDICARE

## 2023-11-28 VITALS — BODY MASS INDEX: 32.21 KG/M2 | WEIGHT: 225 LBS | HEIGHT: 70 IN

## 2023-11-28 VITALS
BODY MASS INDEX: 32.35 KG/M2 | WEIGHT: 226 LBS | OXYGEN SATURATION: 100 % | HEART RATE: 72 BPM | DIASTOLIC BLOOD PRESSURE: 70 MMHG | TEMPERATURE: 97 F | RESPIRATION RATE: 15 BRPM | SYSTOLIC BLOOD PRESSURE: 147 MMHG | HEIGHT: 70 IN

## 2023-11-28 DIAGNOSIS — Z96.641 S/P TOTAL RIGHT HIP ARTHROPLASTY: Primary | ICD-10-CM

## 2023-11-28 PROCEDURE — 1159F MED LIST DOCD IN RCRD: CPT | Mod: CPTII,S$GLB,, | Performed by: ORTHOPAEDIC SURGERY

## 2023-11-28 PROCEDURE — 1101F PR PT FALLS ASSESS DOC 0-1 FALLS W/OUT INJ PAST YR: ICD-10-PCS | Mod: CPTII,S$GLB,, | Performed by: ORTHOPAEDIC SURGERY

## 2023-11-28 PROCEDURE — 1101F PT FALLS ASSESS-DOCD LE1/YR: CPT | Mod: CPTII,S$GLB,, | Performed by: ORTHOPAEDIC SURGERY

## 2023-11-28 PROCEDURE — 99024 POSTOP FOLLOW-UP VISIT: CPT | Mod: S$GLB,,, | Performed by: ORTHOPAEDIC SURGERY

## 2023-11-28 PROCEDURE — 1160F PR REVIEW ALL MEDS BY PRESCRIBER/CLIN PHARMACIST DOCUMENTED: ICD-10-PCS | Mod: CPTII,S$GLB,, | Performed by: ORTHOPAEDIC SURGERY

## 2023-11-28 PROCEDURE — 99024 PR POST-OP FOLLOW-UP VISIT: ICD-10-PCS | Mod: S$GLB,,, | Performed by: ORTHOPAEDIC SURGERY

## 2023-11-28 PROCEDURE — 3288F PR FALLS RISK ASSESSMENT DOCUMENTED: ICD-10-PCS | Mod: CPTII,S$GLB,, | Performed by: ORTHOPAEDIC SURGERY

## 2023-11-28 PROCEDURE — G0180 MD CERTIFICATION HHA PATIENT: HCPCS | Mod: ,,, | Performed by: ORTHOPAEDIC SURGERY

## 2023-11-28 PROCEDURE — 3288F FALL RISK ASSESSMENT DOCD: CPT | Mod: CPTII,S$GLB,, | Performed by: ORTHOPAEDIC SURGERY

## 2023-11-28 PROCEDURE — 1159F PR MEDICATION LIST DOCUMENTED IN MEDICAL RECORD: ICD-10-PCS | Mod: CPTII,S$GLB,, | Performed by: ORTHOPAEDIC SURGERY

## 2023-11-28 PROCEDURE — 1160F RVW MEDS BY RX/DR IN RCRD: CPT | Mod: CPTII,S$GLB,, | Performed by: ORTHOPAEDIC SURGERY

## 2023-11-28 NOTE — PLAN OF CARE
Pt cleared for discharge per PT and OT. Pt due to void. Pt denies urge to void at this time. Pt in recliner, wheels locked. Call light within reach. Water provided to pt per pt request. Pt's spouse at bedside. Pt instructed to call for assistance with mobility. Pt verbalized understanding.       1810--Pt still due to void. Pt unable to void. Bladder scan shows 867 mL. Pt requesting in/out cath. Dr. Jordan notified per SARAH Ortega. Per Dr. Jordan, in/out cath pt then OK to discharge pt, educate pt to return to ER if unable to void within 8 hours. Pt and pt's spouse updated, pt reports he is not agreeable to returning to ER within 8 hours if unable to void. Pt reports he is agreeable to in/out cath and then waiting to see if he is able to urinate before discharge. Dr. Jordan updated.      1830--In/out cath performed per SARAH Ortega. 825 mL clear yellow urine noted. Pt tolerated well. Pt in recliner, wheels locked. Call light within reach. Water and juice provided to pt per pt request. Pt instructed to call for assistance with mobility. Pt verbalized understanding. Updated Charge RN.      1920--Pt has not yet voided. Pt reports he wants to go home, and stated he will go to ER if unable to void within 8 hours. SARAH Ortega discussed with pt potential complications if pt does not go to ER within 8 hours, pt and pt's spouse verbalized understanding. Updated Dr. Jordan, OK to discharge per MD.

## 2023-11-28 NOTE — PLAN OF CARE
"Pt awake, alert, oriented. Vital signs stable. Pt reports he is "ready to go". Cleared for discharge by PT and OT. Pt reports having all necessary DME. Discharge instructions reviewed with pt and pt's spouse. Pt and pt's spouse verbalized understanding. IV removed, catheter intact. Dressing to R hip clean, dry, and intact. All belongings returned to patient. Pt transferred to car via wheelchair per SARAH Ortega. Safety maintained.   "

## 2023-11-28 NOTE — PROGRESS NOTES
[facility] ORTHOPEDICS POST-OP NOTE    Subjective:           Chief Complaint:   Chief Complaint   Patient presents with    Right Hip - Post-op Evaluation     POD 1 Right INDY. States he is doing       Past Medical History:   Diagnosis Date    Cancer 01/01/1992    colon    Cancer of bladder 2005    Hyperlipidemia     Pre-diabetes        Past Surgical History:   Procedure Laterality Date    BLADDER SURGERY      CATARACT EXTRACTION Bilateral 02/2023    COLONOSCOPY      COLONOSCOPY N/A 11/24/2020    Procedure: COLONOSCOPY;  Surgeon: Andrew Horan MD;  Location: [facility];  Service: Endoscopy;  Laterality: N/A;    EYE SURGERY      HERNIA REPAIR      left inguinal        Current Outpatient Medications   Medication Sig    aspirin 81 MG Chew Take 1 tablet (81 mg total) by mouth 2 (two) times daily with meals. Take as directed twice daily for DVT or deep vein thrombus prophylaxis status post joint replacement surgery.    celecoxib (CELEBREX) 200 MG capsule Take 1 capsule (200 mg total) by mouth 2 (two) times daily. Take as directed twice daily for inflammation postoperatively after joint replacement surgery.    diclofenac (VOLTAREN) 75 MG EC tablet Take 1 tablet (75 mg total) by mouth 2 (two) times daily as needed (pain). for pain    docusate sodium (COLACE) 100 MG capsule Take 1 capsule (100 mg total) by mouth 2 (two) times daily.    ezetimibe-simvastatin 10-40 mg (VYTORIN) 10-40 mg per tablet TAKE 1 TABLET EVERY EVENING    gabapentin (NEURONTIN) 300 MG capsule Take 1 capsule (300 mg total) by mouth 2 (two) times daily. Take as directed twice daily after joint replacement surgery as an adjunct to pain medication.    ondansetron (ZOFRAN-ODT) 4 MG TbDL Take 1 tablet (4 mg total) by mouth every 6 (six) hours as needed (Postop nausea and vomiting or nausea associated with narcotic pain medication).    ONETOUCH DELICA PLUS LANCET 33 gauge Misc 1 lancet by Misc.(Non-Drug; Combo Route) route 2 (two) times daily.    ONETOUCH VERIO  TEST STRIPS Strp 1 strip by Misc.(Non-Drug; Combo Route) route 2 (two) times daily.    oxyCODONE-acetaminophen (PERCOCET) 7.5-325 mg per tablet Take 1 tablet by mouth every 6 (six) hours as needed for Pain. Take as directed for postoperative pain after joint replacement surgery. Date of surgery:  November 27    ONETOUCH VERIO IQ METER Misc 1 kit by Misc.(Non-Drug; Combo Route) route 2 (two) times daily.     No current facility-administered medications for this visit.       Review of patient's allergies indicates:  No Known Allergies    Family History   Problem Relation Age of Onset    Alzheimer's disease Mother     Dementia Mother     Cancer Father         prostate     Heart disease Father     No Known Problems Brother        Social History     Socioeconomic History    Marital status:    Occupational History    Occupation: retired [employer]    Tobacco Use    Smoking status: Never    Smokeless tobacco: Never   Substance and Sexual Activity    Alcohol use: Yes     Alcohol/week: 5.0 standard drinks of alcohol     Types: 5 Cans of beer per week     Comment: 4-5 beer per week     Drug use: No    Sexual activity: Yes     Partners: Female     Birth control/protection: None       History of present illness:  Mr. Nayak comes in today postop day 1 right total hip arthroplasty.  Comes in today for wound check, dressing change, and pain control assessment.  His pain is controlled.  He is doing well.  He is ambulating with his rolling walker.  It did have some urinary retention postoperatively that has resolved since he has been home.  He is urinating without difficulty he states.    Review of Systems:    Musculoskeletal:  See HPI      Objective:        Physical Examination:    Vital Signs:  There were no vitals filed for this visit.    Body mass index is 32.28 kg/m².    This a well-developed, well nourished patient in no acute distress.  They are alert and oriented and cooperative to examination.        Right hip exam:  "Skin to the right hip is clean dry and intact.  No erythema or ecchymosis.  Right lateral hip incision is healing well without wound dehiscence or drainage.  No signs or symptoms of infection.  Is neurovascularly intact throughout the right lower extremity.  Right calf is soft and nontender.  He can weightbear as tolerated on the right lower extremity.  He ambulates with a rolling walker.      Pertinent New Results:    XRAY Report / Interpretation:   No new radiographs taken on today's clinic visit.    Assessment/Plan:      1. Status post right total hip arthroplasty.      Dressing change to right hip today.  He tolerated this well.  Scheduled to start home health physical therapy later today.  He is using aspirin 81 mg by mouth twice a day for DVT prophylaxis.  We did discuss total hip replacement precautions with him today to reduce the incidence/risk of dislocation.  We will see him back in 10-12 days for wound check and suture removal.    Paddy Regan, Physician Assistant, served in the capacity as a "scribe" for this patient encounter.  A "face-to-face" encounter occurred with Dr. Ian Jiménez on this date.  The treatment plan and medical decision-making is outlined above. Patient was seen and examined with a chaperone.     This note was created using Dragon voice recognition software that occasionally misinterpreted phrases or words.      "

## 2023-11-28 NOTE — PLAN OF CARE
Pt unable to void, not feeling urgency. RN bladder scanned which revealed 867ml. RN notified anesthesia MD. MD to put in orders for in/out cath and instructions for pt to go to ER if unable to void 8 hours after returning home. RN to update pt on POC.

## 2023-12-11 ENCOUNTER — OFFICE VISIT (OUTPATIENT)
Dept: ORTHOPEDICS | Facility: CLINIC | Age: 78
End: 2023-12-11
Payer: MEDICARE

## 2023-12-11 VITALS — BODY MASS INDEX: 32.21 KG/M2 | WEIGHT: 225 LBS | HEIGHT: 70 IN

## 2023-12-11 DIAGNOSIS — Z96.641 S/P TOTAL RIGHT HIP ARTHROPLASTY: Primary | ICD-10-CM

## 2023-12-11 PROCEDURE — 1101F PT FALLS ASSESS-DOCD LE1/YR: CPT | Mod: CPTII,S$GLB,, | Performed by: PHYSICIAN ASSISTANT

## 2023-12-11 PROCEDURE — 3288F FALL RISK ASSESSMENT DOCD: CPT | Mod: CPTII,S$GLB,, | Performed by: PHYSICIAN ASSISTANT

## 2023-12-11 PROCEDURE — 1101F PR PT FALLS ASSESS DOC 0-1 FALLS W/OUT INJ PAST YR: ICD-10-PCS | Mod: CPTII,S$GLB,, | Performed by: PHYSICIAN ASSISTANT

## 2023-12-11 PROCEDURE — 1126F PR PAIN SEVERITY QUANTIFIED, NO PAIN PRESENT: ICD-10-PCS | Mod: CPTII,S$GLB,, | Performed by: PHYSICIAN ASSISTANT

## 2023-12-11 PROCEDURE — 99024 POSTOP FOLLOW-UP VISIT: CPT | Mod: S$GLB,,, | Performed by: PHYSICIAN ASSISTANT

## 2023-12-11 PROCEDURE — 1159F MED LIST DOCD IN RCRD: CPT | Mod: CPTII,S$GLB,, | Performed by: PHYSICIAN ASSISTANT

## 2023-12-11 PROCEDURE — 1160F RVW MEDS BY RX/DR IN RCRD: CPT | Mod: CPTII,S$GLB,, | Performed by: PHYSICIAN ASSISTANT

## 2023-12-11 PROCEDURE — 1160F PR REVIEW ALL MEDS BY PRESCRIBER/CLIN PHARMACIST DOCUMENTED: ICD-10-PCS | Mod: CPTII,S$GLB,, | Performed by: PHYSICIAN ASSISTANT

## 2023-12-11 PROCEDURE — 99024 PR POST-OP FOLLOW-UP VISIT: ICD-10-PCS | Mod: S$GLB,,, | Performed by: PHYSICIAN ASSISTANT

## 2023-12-11 PROCEDURE — 1126F AMNT PAIN NOTED NONE PRSNT: CPT | Mod: CPTII,S$GLB,, | Performed by: PHYSICIAN ASSISTANT

## 2023-12-11 PROCEDURE — 1159F PR MEDICATION LIST DOCUMENTED IN MEDICAL RECORD: ICD-10-PCS | Mod: CPTII,S$GLB,, | Performed by: PHYSICIAN ASSISTANT

## 2023-12-11 PROCEDURE — 3288F PR FALLS RISK ASSESSMENT DOCUMENTED: ICD-10-PCS | Mod: CPTII,S$GLB,, | Performed by: PHYSICIAN ASSISTANT

## 2023-12-11 NOTE — PROGRESS NOTES
[facility] ORTHOPEDICS  [address]  [place] [address]  Phone: [phone]   [phone]    Patient's PCP:Hardeep Pearl MD  Referring Provider: No ref. provider found    POST-OP Note:    Subjective:        Chief Complaint:   Chief Complaint   Patient presents with    Right Hip - Post-op Evaluation     PO Right INDY 11/27/23. States he is doing very well       Past Medical History:   Diagnosis Date    Cancer 01/01/1992    colon    Cancer of bladder 2005    Hyperlipidemia     Pre-diabetes        Past Surgical History:   Procedure Laterality Date    BLADDER SURGERY      CATARACT EXTRACTION Bilateral 02/2023    COLONOSCOPY      COLONOSCOPY N/A 11/24/2020    Procedure: COLONOSCOPY;  Surgeon: Andrew Horan MD;  Location: [facility];  Service: Endoscopy;  Laterality: N/A;    EYE SURGERY      HERNIA REPAIR      left inguinal     HIP REPLACEMENT ARTHROPLASTY Right 11/27/2023    Procedure: ARTHROPLASTY, HIP REPLACEMENT, RIGHT;  Surgeon: Ian Jiménez MD;  Location: [facility];  Service: Orthopedics;  Laterality: Right;  Daniel       Current Outpatient Medications   Medication Sig    aspirin 81 MG Chew Take 1 tablet (81 mg total) by mouth 2 (two) times daily with meals. Take as directed twice daily for DVT or deep vein thrombus prophylaxis status post joint replacement surgery.    celecoxib (CELEBREX) 200 MG capsule Take 1 capsule (200 mg total) by mouth 2 (two) times daily. Take as directed twice daily for inflammation postoperatively after joint replacement surgery.    diclofenac (VOLTAREN) 75 MG EC tablet Take 1 tablet (75 mg total) by mouth 2 (two) times daily as needed (pain). for pain    docusate sodium (COLACE) 100 MG capsule Take 1 capsule (100 mg total) by mouth 2 (two) times daily.    ezetimibe-simvastatin 10-40 mg (VYTORIN) 10-40 mg per tablet TAKE 1 TABLET EVERY EVENING    gabapentin (NEURONTIN) 300 MG capsule Take 1 capsule (300 mg total) by mouth 2 (two) times daily. Take as directed twice  daily after joint replacement surgery as an adjunct to pain medication.    ondansetron (ZOFRAN-ODT) 4 MG TbDL Take 1 tablet (4 mg total) by mouth every 6 (six) hours as needed (Postop nausea and vomiting or nausea associated with narcotic pain medication).    ONETOUCH DELICA PLUS LANCET 33 gauge Misc 1 lancet by Misc.(Non-Drug; Combo Route) route 2 (two) times daily.    ONETOUCH VERIO IQ METER Misc 1 kit by Misc.(Non-Drug; Combo Route) route 2 (two) times daily.    ONETOUCH VERIO TEST STRIPS Strp 1 strip by Misc.(Non-Drug; Combo Route) route 2 (two) times daily.     No current facility-administered medications for this visit.       Review of patient's allergies indicates:  No Known Allergies    Family History   Problem Relation Age of Onset    Alzheimer's disease Mother     Dementia Mother     Cancer Father         prostate     Heart disease Father     No Known Problems Brother        Social History     Socioeconomic History    Marital status:    Occupational History    Occupation: retired [employer]    Tobacco Use    Smoking status: Never    Smokeless tobacco: Never   Substance and Sexual Activity    Alcohol use: Yes     Alcohol/week: 5.0 standard drinks of alcohol     Types: 5 Cans of beer per week     Comment: 4-5 beer per week     Drug use: No    Sexual activity: Yes     Partners: Female     Birth control/protection: None       History of present illness:   Mr. Nayak comes in today 2 weeks status post right total hip arthroplasty.  Comes in today for wound check and suture removal.  He is doing quite well.  He is ambulating with a single prong cane.  He has pain he reports.  He is ready to transition from home health physical therapy to outpatient physical therapy.    Review of Systems:    Musculoskeletal:  See HPI       Objective:        Physical Examination:    Vital Signs: There were no vitals filed for this visit.    Body mass index is 32.28 kg/m².    This a well-developed, well nourished patient in no  acute distress.  They are alert and oriented and cooperative to examination.          Right hip exam: Skin to his right hip is clean dry and intact.  There is no erythema or ecchymosis.  There are no signs or symptoms of infection.  He is neurovascularly intact throughout the right lower extremity.  Right calf is soft and nontender.  Right lateral hip incision healing well without wound dehiscence or drainage.  He can weightbear as tolerated on the right lower extremity.  He ambulates with a single prong cane.    Pertinent New Results:     XRAY Report / Interpretation:   No new radiographs were taken on today's clinic visit.     Assessment:       1. S/P total right hip arthroplasty      Plan:     S/P total right hip arthroplasty  -     Ambulatory referral/consult to Physical/Occupational Therapy; Future; Expected date: 12/18/2023        Follow up in about 4 weeks (around 1/8/2024) for //XR//  PO Right INDY 11/27/23.      Sutures were removed from right hip today.  He tolerated this well.  He was advised to clean the operative site once a day with warm soapy water and not apply any topical creams ointments.  Do not submerge operative site underwater in a pool or bathtub type environment for least 1 more week.  Was instructed to continue his aspirin 81 mg by mouth twice a day for least 2 more weeks to provide a full month postoperative DVT prophylaxis coverage.  Was given a prescription to transition to outpatient physical therapy once home health therapy has commenced.  He declined need for refill of pain medication today.  I instructed him to call the office if he determines he does need a refill.  We will check him back in the office in 4 weeks to assess his progress with therapy and obtain plain film radiographs of the right hip at that time.  Once again I did review with him total hip replacement precautions to include internal/external rotational maneuvers at the hip as well as flexion at the hip to help reduce  the incidence of dislocation. These restrictions are in effect for 3 months post-op.      Paddy Regan, CHE, PA-C    This note was created using [employer] voice recognition software that occasionally misinterprets words or phrases.

## 2023-12-20 ENCOUNTER — TELEPHONE (OUTPATIENT)
Dept: PRIMARY CARE CLINIC | Facility: CLINIC | Age: 78
End: 2023-12-20
Payer: MEDICARE

## 2023-12-20 NOTE — TELEPHONE ENCOUNTER
----- Message from Jose Roberto Hernandez sent at 12/20/2023  9:08 AM CST -----  Contact: Maryellen Crane/ [employer] [phone]  The ins company wants to know if you wants to be credentialed in for Maryellen Crane to cover you because, as of now you are not covered at the [place] address.    Thank you

## 2024-01-09 ENCOUNTER — OFFICE VISIT (OUTPATIENT)
Dept: ORTHOPEDICS | Facility: CLINIC | Age: 79
End: 2024-01-09
Payer: MEDICARE

## 2024-01-09 VITALS — HEIGHT: 70 IN | BODY MASS INDEX: 32.21 KG/M2 | WEIGHT: 225 LBS

## 2024-01-09 DIAGNOSIS — Z96.641 S/P TOTAL RIGHT HIP ARTHROPLASTY: Primary | ICD-10-CM

## 2024-01-09 PROCEDURE — 1160F RVW MEDS BY RX/DR IN RCRD: CPT | Mod: CPTII,S$GLB,, | Performed by: ORTHOPAEDIC SURGERY

## 2024-01-09 PROCEDURE — 1159F MED LIST DOCD IN RCRD: CPT | Mod: CPTII,S$GLB,, | Performed by: ORTHOPAEDIC SURGERY

## 2024-01-09 PROCEDURE — 3288F FALL RISK ASSESSMENT DOCD: CPT | Mod: CPTII,S$GLB,, | Performed by: ORTHOPAEDIC SURGERY

## 2024-01-09 PROCEDURE — 1125F AMNT PAIN NOTED PAIN PRSNT: CPT | Mod: CPTII,S$GLB,, | Performed by: ORTHOPAEDIC SURGERY

## 2024-01-09 PROCEDURE — 99024 POSTOP FOLLOW-UP VISIT: CPT | Mod: S$GLB,,, | Performed by: ORTHOPAEDIC SURGERY

## 2024-01-09 PROCEDURE — 1101F PT FALLS ASSESS-DOCD LE1/YR: CPT | Mod: CPTII,S$GLB,, | Performed by: ORTHOPAEDIC SURGERY

## 2024-01-09 NOTE — PROGRESS NOTES
[facility]THOPEDICS POST-OP NOTE    Subjective:           Chief Complaint:   Chief Complaint   Patient presents with    Right Hip - Post-op Evaluation     S/p R INDY 11/27/23, no pain, finishes PT tomorrow        Past Medical History:   Diagnosis Date    Cancer 01/01/1992    colon    Cancer of bladder 2005    Hyperlipidemia     Pre-diabetes        Past Surgical History:   Procedure Laterality Date    BLADDER SURGERY      CATARACT EXTRACTION Bilateral 02/2023    COLONOSCOPY      COLONOSCOPY N/A 11/24/2020    Procedure: COLONOSCOPY;  Surgeon: Andrew Horan MD;  Location: [facility];  Service: Endoscopy;  Laterality: N/A;    EYE SURGERY      HERNIA REPAIR      left inguinal     HIP REPLACEMENT ARTHROPLASTY Right 11/27/2023    Procedure: ARTHROPLASTY, HIP REPLACEMENT, RIGHT;  Surgeon: Ian Jiménez MD;  Location: [facility];  Service: Orthopedics;  Laterality: Right;  Daniel       Current Outpatient Medications   Medication Sig    aspirin 81 MG Chew Take 1 tablet (81 mg total) by mouth 2 (two) times daily with meals. Take as directed twice daily for DVT or deep vein thrombus prophylaxis status post joint replacement surgery.    diclofenac (VOLTAREN) 75 MG EC tablet Take 1 tablet (75 mg total) by mouth 2 (two) times daily as needed (pain). for pain    ezetimibe-simvastatin 10-40 mg (VYTORIN) 10-40 mg per tablet TAKE 1 TABLET EVERY EVENING    gabapentin (NEURONTIN) 300 MG capsule Take 1 capsule (300 mg total) by mouth 2 (two) times daily. Take as directed twice daily after joint replacement surgery as an adjunct to pain medication.    ondansetron (ZOFRAN-ODT) 4 MG TbDL Take 1 tablet (4 mg total) by mouth every 6 (six) hours as needed (Postop nausea and vomiting or nausea associated with narcotic pain medication).    ONETOUCH DELICA PLUS LANCET 33 gauge Misc 1 lancet by Misc.(Non-Drug; Combo Route) route 2 (two) times daily.    ONETOUCH VERIO IQ METER Misc 1 kit by Misc.(Non-Drug; Combo Route) route 2 (two) times daily.     ONETOUCH VERIO TEST STRIPS Strp 1 strip by Misc.(Non-Drug; Combo Route) route 2 (two) times daily.     No current facility-administered medications for this visit.       Review of patient's allergies indicates:  No Known Allergies    Family History   Problem Relation Age of Onset    Alzheimer's disease Mother     Dementia Mother     Cancer Father         prostate     Heart disease Father     No Known Problems Brother        Social History     Socioeconomic History    Marital status:    Occupational History    Occupation: retired [employer]    Tobacco Use    Smoking status: Never    Smokeless tobacco: Never   Substance and Sexual Activity    Alcohol use: Yes     Alcohol/week: 5.0 standard drinks of alcohol     Types: 5 Cans of beer per week     Comment: 4-5 beer per week     Drug use: No    Sexual activity: Yes     Partners: Female     Birth control/protection: None       History of present illness:  78-year-old male, returns to clinic today for the right hip, he is status post right total hip arthroplasty November 27th of this year.  He is here today for routine follow-up.  This was a total hip arthroplasty for severe right hip OA.  He also complains of some left hip pain.  This is very mild no where near the extent of his right hip.      Review of Systems:    Musculoskeletal:  See HPI      Objective:        Physical Examination:    Vital Signs:  There were no vitals filed for this visit.    Body mass index is 32.28 kg/m².    This a well-developed, well nourished patient in no acute distress.  They are alert and oriented and cooperative to examination.        Examination of the right hip, the skin is dry and intact, no erythema or ecchymosis, no signs symptoms of infection.  The very distal end of his surgical incision he has a small palpable area which I believe may reflect a small piece of suture.  This was not easily accessible to remove.  No signs symptoms of infection so we will just continue to observe.   "No pain with gentle range of motion of the right hip.  Pertinent New Results:    XRAY Report / Interpretation:   AP of the right hip and AP pelvis taken today in the office demonstrate a right total hip arthroplasty to be in appropriate position without evidence of hardware failure or loosening.  He does have moderate to severe left hip OA with loss of the femoral acetabular space.    Assessment/Plan:      Status post right total hip arthroplasty 6 weeks ago.  Patient is doing exceptionally well with no significant complaints of pain.  He is doing physical therapy, he is scheduled to be discharged tomorrow.  We went over his hip precautions.  We reviewed his x-rays.  We talked about his left hip, he does have some fairly moderate to severe left hip OA but in comparison to his right hip preoperatively it is no where near as bad which is what he wanted to know.  Consider left hip intra-articular injection if it begins to bother him.  He was skeptical of the injection because the right hip injection did not work at all.  But again he had such severe advanced arthritis in the right hip I am not surprised.  He may get a better result with a left hip injection prior to considering left hip surgery.  He will follow up with us on an as-needed basis for the right hip.    Damien Garcia, Physician Assistant, served in the capacity as a "scribe" for this patient encounter.  A "face-to-face" encounter occurred with Dr. Ian Jiménez on this date.  The treatment plan and medical decision-making is outlined above. Patient was seen and examined with a chaperone.     This note was created using Dragon voice recognition software that occasionally misinterpreted phrases or words.        "

## 2024-01-12 ENCOUNTER — EXTERNAL HOME HEALTH (OUTPATIENT)
Dept: HOME HEALTH SERVICES | Facility: HOSPITAL | Age: 79
End: 2024-01-12
Payer: MEDICARE

## 2024-02-21 ENCOUNTER — TELEPHONE (OUTPATIENT)
Dept: PRIMARY CARE CLINIC | Facility: CLINIC | Age: 79
End: 2024-02-21

## 2024-02-21 ENCOUNTER — PATIENT MESSAGE (OUTPATIENT)
Dept: PRIMARY CARE CLINIC | Facility: CLINIC | Age: 79
End: 2024-02-21
Payer: MEDICARE

## 2024-02-21 DIAGNOSIS — E55.9 VITAMIN D DEFICIENCY: ICD-10-CM

## 2024-02-21 DIAGNOSIS — N18.31 STAGE 3A CHRONIC KIDNEY DISEASE: ICD-10-CM

## 2024-02-21 DIAGNOSIS — E78.1 HYPERTRIGLYCERIDEMIA: Primary | ICD-10-CM

## 2024-02-21 DIAGNOSIS — Z12.5 PROSTATE CANCER SCREENING: ICD-10-CM

## 2024-02-21 DIAGNOSIS — E11.69 TYPE 2 DIABETES MELLITUS WITH HYPERLIPIDEMIA: ICD-10-CM

## 2024-02-21 DIAGNOSIS — E03.8 SUBCLINICAL HYPOTHYROIDISM: ICD-10-CM

## 2024-02-21 DIAGNOSIS — E78.5 TYPE 2 DIABETES MELLITUS WITH HYPERLIPIDEMIA: ICD-10-CM

## 2024-03-02 LAB
25(OH)D3+25(OH)D2 SERPL-MCNC: 36 NG/ML (ref 30–100)
ALBUMIN SERPL-MCNC: 4.4 G/DL (ref 3.6–5.1)
ALBUMIN/GLOB SERPL: 1.6 (CALC) (ref 1–2.5)
ALP SERPL-CCNC: 60 U/L (ref 35–144)
ALT SERPL-CCNC: 25 U/L (ref 9–46)
AST SERPL-CCNC: 20 U/L (ref 10–35)
BASOPHILS # BLD AUTO: 29 CELLS/UL (ref 0–200)
BASOPHILS NFR BLD AUTO: 0.5 %
BILIRUB SERPL-MCNC: 0.6 MG/DL (ref 0.2–1.2)
BUN SERPL-MCNC: 19 MG/DL (ref 7–25)
BUN/CREAT SERPL: ABNORMAL (CALC) (ref 6–22)
CALCIUM SERPL-MCNC: 9.5 MG/DL (ref 8.6–10.3)
CHLORIDE SERPL-SCNC: 101 MMOL/L (ref 98–110)
CHOLEST SERPL-MCNC: 153 MG/DL
CHOLEST/HDLC SERPL: 3.1 (CALC)
CO2 SERPL-SCNC: 29 MMOL/L (ref 20–32)
CREAT SERPL-MCNC: 1.26 MG/DL (ref 0.7–1.28)
EGFR: 59 ML/MIN/1.73M2
EOSINOPHIL # BLD AUTO: 93 CELLS/UL (ref 15–500)
EOSINOPHIL NFR BLD AUTO: 1.6 %
ERYTHROCYTE [DISTWIDTH] IN BLOOD BY AUTOMATED COUNT: 13.1 % (ref 11–15)
GLOBULIN SER CALC-MCNC: 2.7 G/DL (CALC) (ref 1.9–3.7)
GLUCOSE SERPL-MCNC: 151 MG/DL (ref 65–99)
HBA1C MFR BLD: 6.6 % OF TOTAL HGB
HCT VFR BLD AUTO: 40.2 % (ref 38.5–50)
HDLC SERPL-MCNC: 50 MG/DL
HGB BLD-MCNC: 13.3 G/DL (ref 13.2–17.1)
LDLC SERPL CALC-MCNC: 79 MG/DL (CALC)
LYMPHOCYTES # BLD AUTO: 2129 CELLS/UL (ref 850–3900)
LYMPHOCYTES NFR BLD AUTO: 36.7 %
MCH RBC QN AUTO: 28.3 PG (ref 27–33)
MCHC RBC AUTO-ENTMCNC: 33.1 G/DL (ref 32–36)
MCV RBC AUTO: 85.5 FL (ref 80–100)
MONOCYTES # BLD AUTO: 510 CELLS/UL (ref 200–950)
MONOCYTES NFR BLD AUTO: 8.8 %
NEUTROPHILS # BLD AUTO: 3039 CELLS/UL (ref 1500–7800)
NEUTROPHILS NFR BLD AUTO: 52.4 %
NONHDLC SERPL-MCNC: 103 MG/DL (CALC)
PLATELET # BLD AUTO: 223 THOUSAND/UL (ref 140–400)
PMV BLD REES-ECKER: 9.7 FL (ref 7.5–12.5)
POTASSIUM SERPL-SCNC: 4.5 MMOL/L (ref 3.5–5.3)
PROT SERPL-MCNC: 7.1 G/DL (ref 6.1–8.1)
PSA SERPL-MCNC: 0.88 NG/ML
RBC # BLD AUTO: 4.7 MILLION/UL (ref 4.2–5.8)
SODIUM SERPL-SCNC: 136 MMOL/L (ref 135–146)
TRIGL SERPL-MCNC: 140 MG/DL
TSH SERPL-ACNC: 4.38 MIU/L (ref 0.4–4.5)
WBC # BLD AUTO: 5.8 THOUSAND/UL (ref 3.8–10.8)

## 2024-03-11 ENCOUNTER — TELEPHONE (OUTPATIENT)
Dept: ORTHOPEDICS | Facility: CLINIC | Age: 79
End: 2024-03-11

## 2024-03-11 DIAGNOSIS — M16.12 PRIMARY OSTEOARTHRITIS OF LEFT HIP: Primary | ICD-10-CM

## 2024-03-11 NOTE — TELEPHONE ENCOUNTER
Patient has decided that he does want us to order an IA injection for his left hip.  Please sign attached order

## 2024-03-13 ENCOUNTER — TELEPHONE (OUTPATIENT)
Dept: RADIOLOGY | Facility: HOSPITAL | Age: 79
End: 2024-03-13

## 2024-03-13 NOTE — NURSING
Left hip inj scheduled @ [facility] on 3/15 @ 1pm with arrival @ 1230. Takes Aspirin every Sunday and did not take this past Sunday.pre-procedure instructions given and understanding verbalized.

## 2024-03-15 ENCOUNTER — HOSPITAL ENCOUNTER (OUTPATIENT)
Dept: RADIOLOGY | Facility: HOSPITAL | Age: 79
Discharge: HOME | End: 2024-03-15
Attending: PHYSICIAN ASSISTANT
Payer: MEDICARE

## 2024-03-15 DIAGNOSIS — M16.12 PRIMARY OSTEOARTHRITIS OF LEFT HIP: ICD-10-CM

## 2024-03-15 PROCEDURE — 25500020 PHARM REV CODE 255: Performed by: PHYSICIAN ASSISTANT

## 2024-03-15 PROCEDURE — 63600175 PHARM REV CODE 636 W HCPCS: Mod: JZ,JG | Performed by: PHYSICIAN ASSISTANT

## 2024-03-15 PROCEDURE — 77002 NEEDLE LOCALIZATION BY XRAY: CPT | Mod: TC

## 2024-03-15 PROCEDURE — 25000003 PHARM REV CODE 250: Performed by: PHYSICIAN ASSISTANT

## 2024-03-15 RX ORDER — METHYLPREDNISOLONE ACETATE 40 MG/ML
40 INJECTION, SUSPENSION INTRA-ARTICULAR; INTRALESIONAL; INTRAMUSCULAR; SOFT TISSUE ONCE
Status: COMPLETED | OUTPATIENT
Start: 2024-03-15 | End: 2024-03-15

## 2024-03-15 RX ORDER — LIDOCAINE HYDROCHLORIDE 10 MG/ML
5 INJECTION INFILTRATION; PERINEURAL ONCE
Status: COMPLETED | OUTPATIENT
Start: 2024-03-15 | End: 2024-03-15

## 2024-03-15 RX ORDER — BUPIVACAINE HYDROCHLORIDE 2.5 MG/ML
4 INJECTION, SOLUTION EPIDURAL; INFILTRATION; INTRACAUDAL ONCE
Status: COMPLETED | OUTPATIENT
Start: 2024-03-15 | End: 2024-03-15

## 2024-03-15 RX ADMIN — IOHEXOL 3 ML: 300 INJECTION, SOLUTION INTRAVENOUS at 01:03

## 2024-03-15 RX ADMIN — LIDOCAINE HYDROCHLORIDE 5 ML: 10 INJECTION, SOLUTION INFILTRATION; PERINEURAL at 01:03

## 2024-03-15 RX ADMIN — METHYLPREDNISOLONE ACETATE 40 MG: 40 INJECTION, SUSPENSION INTRA-ARTICULAR; INTRALESIONAL; INTRAMUSCULAR; SOFT TISSUE at 01:03

## 2024-03-15 RX ADMIN — BUPIVACAINE HYDROCHLORIDE 10 MG: 2.5 INJECTION, SOLUTION EPIDURAL; INFILTRATION; INTRACAUDAL; PERINEURAL at 01:03

## 2024-03-24 ENCOUNTER — PATIENT MESSAGE (OUTPATIENT)
Dept: PRIMARY CARE CLINIC | Facility: CLINIC | Age: 79
End: 2024-03-24
Payer: MEDICARE

## 2024-03-25 DIAGNOSIS — E78.00 HYPERCHOLESTEREMIA: Chronic | ICD-10-CM

## 2024-03-25 RX ORDER — DICLOFENAC SODIUM 75 MG/1
75 TABLET, DELAYED RELEASE ORAL 2 TIMES DAILY PRN
Qty: 180 TABLET | Refills: 0 | Status: SHIPPED | OUTPATIENT
Start: 2024-03-25

## 2024-03-25 RX ORDER — EZETIMIBE AND SIMVASTATIN 10; 40 MG/1; MG/1
1 TABLET ORAL NIGHTLY
Qty: 90 TABLET | Refills: 2 | Status: SHIPPED | OUTPATIENT
Start: 2024-03-25

## 2024-03-25 NOTE — TELEPHONE ENCOUNTER
No care due was identified.  [facility] Embedded Care Due Messages. Reference number: [id number].   3/25/2024 10:40:49 AM CDT

## 2024-04-10 ENCOUNTER — OFFICE VISIT (OUTPATIENT)
Dept: PRIMARY CARE CLINIC | Facility: CLINIC | Age: 79
End: 2024-04-10
Payer: MEDICARE

## 2024-04-10 VITALS
RESPIRATION RATE: 18 BRPM | BODY MASS INDEX: 33.99 KG/M2 | SYSTOLIC BLOOD PRESSURE: 138 MMHG | WEIGHT: 237.44 LBS | HEIGHT: 70 IN | DIASTOLIC BLOOD PRESSURE: 74 MMHG | TEMPERATURE: 98 F | OXYGEN SATURATION: 99 % | HEART RATE: 78 BPM

## 2024-04-10 DIAGNOSIS — E78.5 TYPE 2 DIABETES MELLITUS WITH HYPERLIPIDEMIA: Primary | ICD-10-CM

## 2024-04-10 DIAGNOSIS — M25.551 BILATERAL HIP PAIN: ICD-10-CM

## 2024-04-10 DIAGNOSIS — D48.5 NEOPLASM OF UNCERTAIN BEHAVIOR OF SKIN: ICD-10-CM

## 2024-04-10 DIAGNOSIS — M25.552 BILATERAL HIP PAIN: ICD-10-CM

## 2024-04-10 DIAGNOSIS — N18.31 STAGE 3A CHRONIC KIDNEY DISEASE: ICD-10-CM

## 2024-04-10 DIAGNOSIS — E11.69 TYPE 2 DIABETES MELLITUS WITH HYPERLIPIDEMIA: Primary | ICD-10-CM

## 2024-04-10 DIAGNOSIS — E03.8 SUBCLINICAL HYPOTHYROIDISM: ICD-10-CM

## 2024-04-10 PROCEDURE — 1160F RVW MEDS BY RX/DR IN RCRD: CPT | Mod: CPTII,S$GLB,, | Performed by: FAMILY MEDICINE

## 2024-04-10 PROCEDURE — 1125F AMNT PAIN NOTED PAIN PRSNT: CPT | Mod: CPTII,S$GLB,, | Performed by: FAMILY MEDICINE

## 2024-04-10 PROCEDURE — 3288F FALL RISK ASSESSMENT DOCD: CPT | Mod: CPTII,S$GLB,, | Performed by: FAMILY MEDICINE

## 2024-04-10 PROCEDURE — 1101F PT FALLS ASSESS-DOCD LE1/YR: CPT | Mod: CPTII,S$GLB,, | Performed by: FAMILY MEDICINE

## 2024-04-10 PROCEDURE — 99214 OFFICE O/P EST MOD 30 MIN: CPT | Mod: S$GLB,,, | Performed by: FAMILY MEDICINE

## 2024-04-10 PROCEDURE — 1159F MED LIST DOCD IN RCRD: CPT | Mod: CPTII,S$GLB,, | Performed by: FAMILY MEDICINE

## 2024-04-10 PROCEDURE — 3075F SYST BP GE 130 - 139MM HG: CPT | Mod: CPTII,S$GLB,, | Performed by: FAMILY MEDICINE

## 2024-04-10 PROCEDURE — 99999 PR PBB SHADOW E&M-EST. PATIENT-LVL IV: CPT | Mod: PBBFAC,,, | Performed by: FAMILY MEDICINE

## 2024-04-10 PROCEDURE — 3078F DIAST BP <80 MM HG: CPT | Mod: CPTII,S$GLB,, | Performed by: FAMILY MEDICINE

## 2024-04-10 RX ORDER — TRAMADOL HYDROCHLORIDE 50 MG/1
50 TABLET ORAL EVERY 4 HOURS PRN
Qty: 30 TABLET | Refills: 0 | Status: SHIPPED | OUTPATIENT
Start: 2024-04-10

## 2024-04-10 RX ORDER — TRAMADOL HYDROCHLORIDE 50 MG/1
50 TABLET ORAL EVERY 8 HOURS PRN
COMMUNITY
End: 2024-04-10 | Stop reason: SDUPTHER

## 2024-04-10 NOTE — PROGRESS NOTES
"Subjective:       Patient ID: Nael Mistry is a 78 y.o. male.    Chief Complaint: Follow-up (6 month f/up)    A1c continues to slowly trend up, now 6.6%.  Admits that he has not eating ideally.  Has never been on diabetic medications and would like to avoid them if possible.  Underwent successful right hip arthroplasty last fall, now having left hip pain.  Would like to avoid surgery if possible, but takes tramadol approximately once per week, requesting refill      Review of Systems   Respiratory:  Negative for shortness of breath.    Cardiovascular:  Negative for chest pain.   Musculoskeletal:  Positive for arthralgias.   Neurological:  Negative for dizziness.   Psychiatric/Behavioral:  Negative for agitation and confusion.        Objective:      Vitals:    04/10/24 0925   BP: 138/74   BP Location: Right arm   Patient Position: Sitting   BP Method: Medium (Manual)   Pulse: 78   Resp: 18   Temp: 97.5 °F (36.4 °C)   TempSrc: Temporal   SpO2: 99%   Weight: 107.7 kg (237 lb 7 oz)   Height: 5' 10" (1.778 m)     BP Readings from Last 5 Encounters:   04/10/24 138/74   11/27/23 (!) 147/70   09/13/23 136/76   03/07/23 124/72   08/19/22 114/66     Wt Readings from Last 5 Encounters:   04/10/24 107.7 kg (237 lb 7 oz)   01/09/24 102.1 kg (225 lb)   12/11/23 102.1 kg (225 lb)   11/28/23 102.1 kg (225 lb)   11/27/23 102.5 kg (225 lb 15.5 oz)     Physical Exam  Vitals and nursing note reviewed.   Constitutional:       General: He is not in acute distress.     Appearance: Normal appearance. He is well-developed.   HENT:      Head: Normocephalic and atraumatic.   Cardiovascular:      Rate and Rhythm: Normal rate and regular rhythm.      Heart sounds: Normal heart sounds.   Pulmonary:      Effort: Pulmonary effort is normal.      Breath sounds: Normal breath sounds.   Musculoskeletal:      Right lower leg: No edema.      Left lower leg: No edema.   Skin:     General: Skin is warm and dry.   Neurological:      Mental Status: " He is alert and oriented to person, place, and time.   Psychiatric:         Mood and Affect: Mood normal.         Behavior: Behavior normal.         Lab Results   Component Value Date    WBC 5.8 03/01/2024    HGB 13.3 03/01/2024    HCT 40.2 03/01/2024     03/01/2024    CHOL 153 03/01/2024    TRIG 140 03/01/2024    HDL 50 03/01/2024    ALT 25 03/01/2024    AST 20 03/01/2024     03/01/2024    K 4.5 03/01/2024     03/01/2024    CREATININE 1.26 03/01/2024    BUN 19 03/01/2024    CO2 29 03/01/2024    TSH 4.38 03/01/2024    PSA 1.6 02/01/2019    HGBA1C 6.6 (H) 03/01/2024    MICROALBUR <0.2 09/08/2023      Assessment:       1. Type 2 diabetes mellitus with hyperlipidemia    2. Subclinical hypothyroidism    3. Stage 3a chronic kidney disease    4. Bilateral hip pain    5. Neoplasm of uncertain behavior of skin        Plan:       Type 2 diabetes mellitus with hyperlipidemia  -     Comprehensive Metabolic Panel; Future; Expected date: 10/10/2024  -     Hemoglobin A1C; Future; Expected date: 10/10/2024  Stressed importance of dietary modification.  Recheck labs in 6 months.  If A1c continues to trend upward, will need to reconsider meds.  Subclinical hypothyroidism  Most recent TSH normal  Stage 3a chronic kidney disease  Stable  Bilateral hip pain  -     traMADoL (ULTRAM) 50 mg tablet; Take 1 tablet (50 mg total) by mouth every 4 (four) hours as needed for Pain.  Dispense: 30 tablet; Refill: 0   reviewed, no worrisome findings  Neoplasm of uncertain behavior of skin  -     Ambulatory referral/consult to Dermatology; Future; Expected date: 04/17/2024      Medication List with Changes/Refills   Current Medications    DICLOFENAC (VOLTAREN) 75 MG EC TABLET    Take 1 tablet (75 mg total) by mouth 2 (two) times daily as needed (pain). for pain    EZETIMIBE-SIMVASTATIN 10-40 MG (VYTORIN) 10-40 MG PER TABLET    Take 1 tablet by mouth every evening.    ONETOUCH DELICA PLUS LANCET 33 GAUGE MISC    1 lancet by  Misc.(Non-Drug; Combo Route) route 2 (two) times daily.    ONETOUCH VERIO IQ METER MISC    1 kit by Misc.(Non-Drug; Combo Route) route 2 (two) times daily.    ONETOUCH VERIO TEST STRIPS STRP    1 strip by Misc.(Non-Drug; Combo Route) route 2 (two) times daily.   Changed and/or Refilled Medications    Modified Medication Previous Medication    TRAMADOL (ULTRAM) 50 MG TABLET traMADoL (ULTRAM) 50 mg tablet       Take 1 tablet (50 mg total) by mouth every 4 (four) hours as needed for Pain.    Take 50 mg by mouth every 8 (eight) hours as needed for Pain.   Discontinued Medications    ASPIRIN 81 MG CHEW    Take 1 tablet (81 mg total) by mouth 2 (two) times daily with meals. Take as directed twice daily for DVT or deep vein thrombus prophylaxis status post joint replacement surgery.    GABAPENTIN (NEURONTIN) 300 MG CAPSULE    Take 1 capsule (300 mg total) by mouth 2 (two) times daily. Take as directed twice daily after joint replacement surgery as an adjunct to pain medication.    ONDANSETRON (ZOFRAN-ODT) 4 MG TBDL    Take 1 tablet (4 mg total) by mouth every 6 (six) hours as needed (Postop nausea and vomiting or nausea associated with narcotic pain medication).

## 2024-05-02 ENCOUNTER — OFFICE VISIT (OUTPATIENT)
Dept: ORTHOPEDICS | Facility: CLINIC | Age: 79
End: 2024-05-02
Payer: MEDICARE

## 2024-05-02 VITALS — HEIGHT: 70 IN | WEIGHT: 237.44 LBS | BODY MASS INDEX: 33.99 KG/M2

## 2024-05-02 DIAGNOSIS — M16.12 PRIMARY OSTEOARTHRITIS OF LEFT HIP: ICD-10-CM

## 2024-05-02 DIAGNOSIS — M17.12 PRIMARY OSTEOARTHRITIS OF LEFT KNEE: ICD-10-CM

## 2024-05-02 DIAGNOSIS — S83.242A ACUTE MEDIAL MENISCUS TEAR, LEFT, INITIAL ENCOUNTER: Primary | ICD-10-CM

## 2024-05-02 PROCEDURE — 3288F FALL RISK ASSESSMENT DOCD: CPT | Mod: CPTII,S$GLB,, | Performed by: PHYSICIAN ASSISTANT

## 2024-05-02 PROCEDURE — 1160F RVW MEDS BY RX/DR IN RCRD: CPT | Mod: CPTII,S$GLB,, | Performed by: PHYSICIAN ASSISTANT

## 2024-05-02 PROCEDURE — 1159F MED LIST DOCD IN RCRD: CPT | Mod: CPTII,S$GLB,, | Performed by: PHYSICIAN ASSISTANT

## 2024-05-02 PROCEDURE — 99213 OFFICE O/P EST LOW 20 MIN: CPT | Mod: 25,S$GLB,, | Performed by: PHYSICIAN ASSISTANT

## 2024-05-02 PROCEDURE — 1125F AMNT PAIN NOTED PAIN PRSNT: CPT | Mod: CPTII,S$GLB,, | Performed by: PHYSICIAN ASSISTANT

## 2024-05-02 PROCEDURE — 20610 DRAIN/INJ JOINT/BURSA W/O US: CPT | Mod: LT,S$GLB,, | Performed by: PHYSICIAN ASSISTANT

## 2024-05-02 PROCEDURE — 1101F PT FALLS ASSESS-DOCD LE1/YR: CPT | Mod: CPTII,S$GLB,, | Performed by: PHYSICIAN ASSISTANT

## 2024-05-02 RX ORDER — TRIAMCINOLONE ACETONIDE 40 MG/ML
40 INJECTION, SUSPENSION INTRA-ARTICULAR; INTRAMUSCULAR
Status: DISCONTINUED | OUTPATIENT
Start: 2024-05-02 | End: 2024-05-02 | Stop reason: HOSPADM

## 2024-05-02 RX ADMIN — TRIAMCINOLONE ACETONIDE 40 MG: 40 INJECTION, SUSPENSION INTRA-ARTICULAR; INTRAMUSCULAR at 12:05

## 2024-05-02 NOTE — PROCEDURES
Large Joint Aspiration/Injection: L knee    Date/Time: 5/2/2024 12:00 PM    Performed by: Paddy Regan PA-C  Authorized by: Paddy Regan PA-C    Consent Done?:  Yes (Verbal)  Indications:  Arthritis and pain  Site marked: the procedure site was marked    Timeout: prior to procedure the correct patient, procedure, and site was verified    Prep: patient was prepped and draped in usual sterile fashion      Local anesthesia used?: Yes    Local anesthetic:  Lidocaine 1% without epinephrine    Details:  Needle Size:  25 G  Ultrasonic Guidance for needle placement?: No    Location:  Knee  Site:  L knee  Medications:  40 mg triamcinolone acetonide 40 mg/mL  Patient tolerance:  Patient tolerated the procedure well with no immediate complications

## 2024-05-02 NOTE — PROGRESS NOTES
[facility]  [address]  [place] [address]  Phone: [phone]   [phone]    Patient's PCP: Hardeep Pearl MD  Referring Provider: No ref. provider found    Subjective:      Chief Complaint:   Chief Complaint   Patient presents with    Left Hip - Pain     F/u on left hip pain previous injection done at the hospital 3.15.214. Which did not office much relief  Current pain present 6-8 weeks    Left Knee - Pain     Left knee pain sharp to throbbing pain for 6-8 weeks popping, sharp to throbbing pain stiffness issues with bending and extension        Past Medical History:   Diagnosis Date    Cancer 01/01/1992    colon    Cancer of bladder 2005    Hyperlipidemia     Pre-diabetes        Past Surgical History:   Procedure Laterality Date    BLADDER SURGERY      CATARACT EXTRACTION Bilateral 02/2023    COLONOSCOPY      COLONOSCOPY N/A 11/24/2020    Procedure: COLONOSCOPY;  Surgeon: Andrew Horan MD;  Location: [facility];  Service: Endoscopy;  Laterality: N/A;    EYE SURGERY      HERNIA REPAIR      left inguinal     HIP REPLACEMENT ARTHROPLASTY Right 11/27/2023    Procedure: ARTHROPLASTY, HIP REPLACEMENT, RIGHT;  Surgeon: Ian Jiménez MD;  Location: [facility];  Service: Orthopedics;  Laterality: Right;  Daniel       Current Outpatient Medications   Medication Sig Dispense Refill    diclofenac (VOLTAREN) 75 MG EC tablet Take 1 tablet (75 mg total) by mouth 2 (two) times daily as needed (pain). for pain 180 tablet 0    ezetimibe-simvastatin 10-40 mg (VYTORIN) 10-40 mg per tablet Take 1 tablet by mouth every evening. 90 tablet 2    ONETOUCH DELICA PLUS LANCET 33 gauge Misc 1 lancet by Misc.(Non-Drug; Combo Route) route 2 (two) times daily. 100 each 11    ONETOUCH VERIO IQ METER Misc 1 kit by Misc.(Non-Drug; Combo Route) route 2 (two) times daily. 1 each 0    ONETOUCH VERIO TEST STRIPS Strp 1 strip by Misc.(Non-Drug; Combo Route) route 2 (two) times daily. 100 strip 11    traMADoL  (ULTRAM) 50 mg tablet Take 1 tablet (50 mg total) by mouth every 4 (four) hours as needed for Pain. 30 tablet 0     No current facility-administered medications for this visit.       Review of patient's allergies indicates:  No Known Allergies    Family History   Problem Relation Name Age of Onset    Alzheimer's disease Mother      Dementia Mother      Cancer Father          prostate     Heart disease Father      No Known Problems Brother         Social History     Socioeconomic History    Marital status:    Occupational History    Occupation: retired refinery    Tobacco Use    Smoking status: Never    Smokeless tobacco: Never   Substance and Sexual Activity    Alcohol use: Yes     Alcohol/week: 5.0 standard drinks of alcohol     Types: 5 Cans of beer per week     Comment: 4-5 beer per week     Drug use: No    Sexual activity: Yes     Partners: Female     Birth control/protection: None       History of present illness:  Mr. Mistry comes in today for follow-up for his left hip as well as his left knee.  He has known arthrosis in the left hip.  We ordered an intra-articular injection a couple of months ago that he received.  Had relief for perhaps 1 week.  Unfortunately, he is begun to experience some left knee pain here recently.  Denies any new injury or trauma there.  Comes in today to have that evaluated.  Does have a previous history of a right total hip arthroplasty that has done very well for him.    Review of Systems:    Constitutional: Negative for chills, fever and weight loss.   HENT: Negative for congestion.    Eyes: Negative for discharge and redness.   Respiratory: Negative for cough and shortness of breath.    Cardiovascular: Negative for chest pain.   Gastrointestinal: Negative for nausea and vomiting.   Musculoskeletal: See HPI.   Skin: Negative for rash.   Neurological: Negative for headaches.   Endo/Heme/Allergies: Does not bruise/bleed easily.   Psychiatric/Behavioral: The patient is not  nervous/anxious.    All other systems reviewed and are negative.       Objective:      Physical Examination:    Vital Signs:  There were no vitals filed for this visit.    Body mass index is 34.07 kg/m².    This a well-developed, well nourished patient in no acute distress.  They are alert and oriented and cooperative to examination.     Left hip and knee exam: Skin to left hip and knee clean dry and intact.  There is no erythema or ecchymosis.  There are no signs or symptoms of infection.  He is neurovascularly intact throughout the left lower extremity.  Has limited internal/external rotation of the left hip secondary to stiffness and pain.  For the left knee can extend to 0 and flex to about 110.  It is stable to varus and valgus stresses.  He does have tenderness about medial joint line and anteriorly.  No lateral joint line tenderness.  Left calf is soft and nontender.  Negative straight leg raise on the left.  He can weightbear as tolerated on the left lower extremity.  Nontender over the left greater trochanter.    Pertinent New Results:        XRAY Report / Interpretation:   Three views taken of the left knee today: AP, lateral, and sunrise views.  They reveal no acute fractures or dislocations.  Mild degenerative changes in the medial compartment.      Assessment:       1. Acute medial meniscus tear, left, initial encounter    2. Primary osteoarthritis of left knee    3. Primary osteoarthritis of left hip      Plan:     Acute medial meniscus tear, left, initial encounter  -     X-Ray Knee 3 View Left    Primary osteoarthritis of left knee  -     Large Joint Aspiration/Injection: L knee    Primary osteoarthritis of left hip        Follow up for 3 m f/u, left knee inj f/u, left hip f/u.    I injected his left knee today via an anterolateral approach with 40 mg of Kenalog and lidocaine.  He tolerated this well.  For the left hip, he ultimately he needs a total hip arthroplasty.  I explained this to him today.   He would like to wait until after the summer time to proceed with surgical intervention.  This is completely understandable.  We will set him a follow up in 3 months to assess his response to today's left knee injection and potentially schedule his left total hip arthroplasty.        CHE Pimentel, PA-C    This note was created using iwi voice recognition software that occasionally misinterprets words or phrases.

## 2024-08-30 ENCOUNTER — PATIENT MESSAGE (OUTPATIENT)
Dept: PRIMARY CARE CLINIC | Facility: CLINIC | Age: 79
End: 2024-08-30
Payer: MEDICARE

## 2024-08-30 RX ORDER — DICLOFENAC SODIUM 75 MG/1
75 TABLET, DELAYED RELEASE ORAL 2 TIMES DAILY PRN
Qty: 180 TABLET | Refills: 0 | Status: SHIPPED | OUTPATIENT
Start: 2024-08-30

## 2024-08-30 NOTE — TELEPHONE ENCOUNTER
No care due was identified.  [facility] Embedded Care Due Messages. Reference number: [id number].   8/30/2024 10:33:36 AM CDT

## 2024-09-19 ENCOUNTER — PATIENT MESSAGE (OUTPATIENT)
Dept: PRIMARY CARE CLINIC | Facility: CLINIC | Age: 79
End: 2024-09-19
Payer: MEDICARE

## 2024-09-26 ENCOUNTER — TELEPHONE (OUTPATIENT)
Dept: ORTHOPEDICS | Facility: CLINIC | Age: 79
End: 2024-09-26
Payer: MEDICARE

## 2024-09-26 DIAGNOSIS — Z96.641 S/P TOTAL RIGHT HIP ARTHROPLASTY: Primary | ICD-10-CM

## 2024-09-26 RX ORDER — AMOXICILLIN 500 MG/1
TABLET, FILM COATED ORAL
Qty: 4 TABLET | Refills: 3 | Status: SHIPPED | OUTPATIENT
Start: 2024-09-26

## 2024-10-10 ENCOUNTER — OFFICE VISIT (OUTPATIENT)
Dept: ORTHOPEDICS | Facility: CLINIC | Age: 79
End: 2024-10-10
Payer: MEDICARE

## 2024-10-10 VITALS — HEIGHT: 70 IN | BODY MASS INDEX: 34.36 KG/M2 | WEIGHT: 240 LBS

## 2024-10-10 DIAGNOSIS — M16.12 PRIMARY OSTEOARTHRITIS OF LEFT HIP: Primary | ICD-10-CM

## 2024-10-10 DIAGNOSIS — M17.12 PRIMARY OSTEOARTHRITIS OF LEFT KNEE: ICD-10-CM

## 2024-10-10 PROCEDURE — 99999 PR PBB SHADOW E&M-EST. PATIENT-LVL III: CPT | Mod: PBBFAC,,, | Performed by: ORTHOPAEDIC SURGERY

## 2024-10-10 NOTE — PROGRESS NOTES
[facility]    Subjective:     Chief Complaint:   Chief Complaint   Patient presents with    Left Knee - Pain     Patient is here for a follow up on left knee injection 5.2.24, states pain has gotten worse since last visit. HAS popping and grinding as well as burning and shooting pain. Injection offered relief for about 3 days.     Left Hip - Pain     Also here to discuss and schedule left INDY, states pain has gotten worse since last visit. Has constant burning and shooting pain in the groin area        Past Medical History:   Diagnosis Date    Cancer 01/01/1992    colon    Cancer of bladder 2005    Hyperlipidemia     Pre-diabetes        Past Surgical History:   Procedure Laterality Date    BLADDER SURGERY      CATARACT EXTRACTION Bilateral 02/2023    COLONOSCOPY      COLONOSCOPY N/A 11/24/2020    Procedure: COLONOSCOPY;  Surgeon: Andrew Horan MD;  Location: [facility];  Service: Endoscopy;  Laterality: N/A;    EYE SURGERY      HERNIA REPAIR      left inguinal     HIP REPLACEMENT ARTHROPLASTY Right 11/27/2023    Procedure: ARTHROPLASTY, HIP REPLACEMENT, RIGHT;  Surgeon: Ian Jiménez MD;  Location: [facility] OR;  Service: Orthopedics;  Laterality: Right;  Daniel       Current Outpatient Medications   Medication Sig    amoxicillin (AMOXIL) 500 MG Tab Twp tablets by mouth one hour prior to scheduled dental procedure followed by two tablets by mouth six hours after dental procedure.    diclofenac (VOLTAREN) 75 MG EC tablet Take 1 tablet (75 mg total) by mouth 2 (two) times daily as needed (pain). for pain    ezetimibe-simvastatin 10-40 mg (VYTORIN) 10-40 mg per tablet Take 1 tablet by mouth every evening.    ONETOUCH DELICA PLUS LANCET 33 gauge Misc 1 lancet by Misc.(Non-Drug; Combo Route) route 2 (two) times daily.    ONETOUCH VERIO TEST STRIPS Strp 1 strip by Misc.(Non-Drug; Combo Route) route 2 (two) times daily.    traMADoL (ULTRAM) 50 mg tablet Take 1 tablet (50 mg total) by mouth every 4 (four) hours as  needed for Pain.    ONETOUCH VERIO IQ METER Misc 1 kit by Misc.(Non-Drug; Combo Route) route 2 (two) times daily.     No current facility-administered medications for this visit.       Review of patient's allergies indicates:  No Known Allergies    Family History   Problem Relation Name Age of Onset    Alzheimer's disease Mother      Dementia Mother      Cancer Father          prostate     Heart disease Father      No Known Problems Brother         Social History     Socioeconomic History    Marital status:    Occupational History    Occupation: retired refinery    Tobacco Use    Smoking status: Never    Smokeless tobacco: Never   Substance and Sexual Activity    Alcohol use: Yes     Alcohol/week: 5.0 standard drinks of alcohol     Types: 5 Cans of beer per week     Comment: 4-5 beer per week     Drug use: No    Sexual activity: Yes     Partners: Female     Birth control/protection: None       History of present illness:  79-year-old male, returns to clinic today for the left hip in the left knee.  When he came to see us last year, he was having some right knee pain which was felt to be referred pain from his right hip.  He underwent right total hip arthroplasty and his knee pain went away.  He was having some left knee pain we injected the left knee a few months ago, he had no effect on his symptoms.  He continues to complain of anterior left knee pain at the insertion of the patellar tendon over the tibial tubercle.  He also complains of left hip pain, left groin pain, painful range motion of the left hip.  We sent him for a intra-articular hip injection with minimal to no improvement in his hip pain symptoms either.  He is here today to discuss total hip arthroplasty.      Review of Systems:    Constitution: Negative for chills, fever, and sweats.  Negative for unexplained weight loss.    HENT:  Negative for headaches and blurry vision.    Cardiovascular:Negative for chest pain or irregular heart beat.  Negative for hypertension.    Respiratory:  Negative for cough and shortness of breath.    Gastrointestinal: Negative for abdominal pain, heartburn, melena, nausea, and vomitting.    Genitourinary:  Negative bladder incontinence and dysuria.    Musculoskeletal:  See HPI for details.     Neurological: Negative for numbness.    Psychiatric/Behavioral: Negative for depression.  The patient is not nervous/anxious.      Endocrine: Negative for polyuria    Hematologic/Lymphatic: Negative for bleeding problem.  Does not bruise/bleed easily.    Skin: Negative for poor would healing and rash    Objective:      Physical Examination:    Vital Signs:  There were no vitals filed for this visit.    Body mass index is 34.44 kg/m².    This a well-developed, well nourished patient in no acute distress.  They are alert and oriented and cooperative to examination.        Examination of the left hip, skin is dry and intact, no erythema or ecchymosis, no signs symptoms of infection.  He has got painful range of motion that is referred to the groin with hip range of motion to include abduction adduction flexion extension and limited internal and external rotation.    Pertinent New Results:    XRAY Report / Interpretation:   X-rays of the pelvis as well as x-rays of the knees were reviewed.  He has got good preservation of the joint space in the left knee.  Mild-to-moderate arthritic changes.  However the left hip he has got severe degenerative changes with femoral acetabular collapse.  Bone-on-bone deformity in the left hip.    Assessment/Plan:      Left hip pain, osteoarthritis left hip, history of right total hip arthroplasty.  Patient has failed conservative treatment with the left hip.  Intra-articular steroid injections and conservative measures with persistent left hip pain.  We have offered him left total hip arthroplasty.    Patient was consented for surgery. Risks and benefits of the procedure were discussed in great detail to  "include the expected preoperative, intraoperative and postoperative courses. Complications may include but are not limited to bleeding, infection, damage to nerves, arteries, blood vessels, bones, tendons, ligaments, stiffness, instability, deep vein thrombus (DVT), pulmonary embolus (PE), altered sensation, continued pain, RSD, loss of motion or a need for additional surgery. As well as general anesthetic complications including seizure, stroke, heart attack and even death.    The mobility limitation cannot be sufficiently resolved by the use of a cane. Patient's functional mobility deficit can be sufficiently resolved with the use of a (Rolling Walker or Walker). Patient's mobility limitation significantly impairs their ability to participate in one of more activities of daily living. The use of a (RW or Walker) will significantly improve the patient's ability to participate in MRADLS and the patient will use it on regular basis in the home.    Damien Garcia, Physician Assistant, served in the capacity as a "scribe" for this patient encounter.  A "face-to-face" encounter occurred with Dr. Ian Jiménez on this date.  The treatment plan and medical decision-making is outlined above. Patient was seen and examined with a chaperone.       This note was created using Dragon voice recognition software that occasionally misinterpreted phrases or words.          "

## 2024-10-11 ENCOUNTER — TELEPHONE (OUTPATIENT)
Dept: ORTHOPEDICS | Facility: CLINIC | Age: 79
End: 2024-10-11
Payer: MEDICARE

## 2024-10-11 ENCOUNTER — OFFICE VISIT (OUTPATIENT)
Dept: PRIMARY CARE CLINIC | Facility: CLINIC | Age: 79
End: 2024-10-11
Payer: MEDICARE

## 2024-10-11 ENCOUNTER — CLINICAL SUPPORT (OUTPATIENT)
Dept: PRIMARY CARE CLINIC | Facility: CLINIC | Age: 79
End: 2024-10-11
Attending: FAMILY MEDICINE
Payer: MEDICARE

## 2024-10-11 ENCOUNTER — TELEPHONE (OUTPATIENT)
Dept: PRIMARY CARE CLINIC | Facility: CLINIC | Age: 79
End: 2024-10-11

## 2024-10-11 VITALS
RESPIRATION RATE: 18 BRPM | SYSTOLIC BLOOD PRESSURE: 134 MMHG | DIASTOLIC BLOOD PRESSURE: 66 MMHG | HEIGHT: 70 IN | HEART RATE: 77 BPM | BODY MASS INDEX: 35.05 KG/M2 | WEIGHT: 244.81 LBS | OXYGEN SATURATION: 97 % | TEMPERATURE: 98 F

## 2024-10-11 DIAGNOSIS — F51.04 CHRONIC INSOMNIA: ICD-10-CM

## 2024-10-11 DIAGNOSIS — N18.31 STAGE 3A CHRONIC KIDNEY DISEASE: ICD-10-CM

## 2024-10-11 DIAGNOSIS — E78.5 TYPE 2 DIABETES MELLITUS WITH HYPERLIPIDEMIA: ICD-10-CM

## 2024-10-11 DIAGNOSIS — E78.5 TYPE 2 DIABETES MELLITUS WITH HYPERLIPIDEMIA: Primary | ICD-10-CM

## 2024-10-11 DIAGNOSIS — E03.8 SUBCLINICAL HYPOTHYROIDISM: ICD-10-CM

## 2024-10-11 DIAGNOSIS — E11.69 TYPE 2 DIABETES MELLITUS WITH HYPERLIPIDEMIA: ICD-10-CM

## 2024-10-11 DIAGNOSIS — Z01.818 PREOPERATIVE EXAMINATION: ICD-10-CM

## 2024-10-11 DIAGNOSIS — E11.69 TYPE 2 DIABETES MELLITUS WITH HYPERLIPIDEMIA: Primary | ICD-10-CM

## 2024-10-11 DIAGNOSIS — Z23 NEED FOR VACCINATION: ICD-10-CM

## 2024-10-11 DIAGNOSIS — E78.1 HYPERTRIGLYCERIDEMIA: ICD-10-CM

## 2024-10-11 DIAGNOSIS — Z12.5 PROSTATE CANCER SCREENING: ICD-10-CM

## 2024-10-11 PROBLEM — E55.9 VITAMIN D DEFICIENCY: Status: RESOLVED | Noted: 2022-04-20 | Resolved: 2024-10-11

## 2024-10-11 LAB
OHS QRS DURATION: 92 MS
OHS QTC CALCULATION: 410 MS

## 2024-10-11 PROCEDURE — 99999 PR PBB SHADOW E&M-EST. PATIENT-LVL III: CPT | Mod: PBBFAC,,, | Performed by: FAMILY MEDICINE

## 2024-10-11 RX ORDER — TRAZODONE HYDROCHLORIDE 50 MG/1
50-100 TABLET ORAL NIGHTLY PRN
Qty: 180 TABLET | Refills: 1 | Status: SHIPPED | OUTPATIENT
Start: 2024-10-11

## 2024-10-11 RX ORDER — METFORMIN HYDROCHLORIDE 500 MG/1
500 TABLET, EXTENDED RELEASE ORAL
Qty: 90 TABLET | Refills: 3 | Status: SHIPPED | OUTPATIENT
Start: 2024-10-11

## 2024-10-11 NOTE — TELEPHONE ENCOUNTER
----- Message from Med Assistant Roberson sent at 10/11/2024 12:31 PM CDT -----  Patient called again to schedule surgery

## 2024-10-11 NOTE — PROGRESS NOTES
Assessment:       1. Type 2 diabetes mellitus with hyperlipidemia    2. Need for vaccination    3. Stage 3a chronic kidney disease    4. Hypertriglyceridemia    5. Prostate cancer screening    6. Subclinical hypothyroidism    7. Preoperative examination    8. Chronic insomnia         Plan:       Type 2 diabetes mellitus with hyperlipidemia  -     Diabetic Eye Screening Photo; Future  -     Microalbumin/Creatinine Ratio, Urine; Future; Expected date: 04/11/2025  -     Hemoglobin A1C; Future; Expected date: 04/11/2025  -     metFORMIN (GLUCOPHAGE-XR) 500 MG ER 24hr tablet; Take 1 tablet (500 mg total) by mouth daily with breakfast.  Dispense: 90 tablet; Refill: 3    Need for vaccination  -     influenza (adjuvanted) (Fluad) 45 mcg/0.5 mL IM vaccine (> or = 64 yo) 0.5 mL    Stage 3a chronic kidney disease  -     CBC Auto Differential; Future; Expected date: 04/11/2025    Hypertriglyceridemia  -     Lipid Panel; Future; Expected date: 04/11/2025  -     Comprehensive Metabolic Panel; Future; Expected date: 04/11/2025    Prostate cancer screening  -     PSA, Screening; Future; Expected date: 04/11/2025    Subclinical hypothyroidism  -     TSH; Future; Expected date: 04/11/2025    Preoperative examination  -     EKG 12-lead    Chronic insomnia  -     traZODone (DESYREL) 50 MG tablet; Take 1-2 tablets ( mg total) by mouth nightly as needed for Insomnia.  Dispense: 180 tablet; Refill: 1      Assessment & Plan    - Assessed diabetes control: A1c increased to 6.8%, indicating worsening glycemic control  - Evaluated kidney function: Patient has borderline stage 2-3A chronic kidney disease, likely age-related  - Considered initiating pharmacological management for diabetes given progressive A1c elevation  - Assessed chronic insomnia affecting quality of life  - Reviewed EKG results: Normal, medically cleared for hip arthroplasty    CHRONIC KIDNEY DISEASE:   Explained age-related decline in kidney function and its impact on  estimated GFR.   Discussed importance of hydration, blood pressure control, and blood sugar management for kidney health.   Recommend maintaining hydration for kidney health.   Patient to continue limiting use of NSAIDs to protect kidneys.   Discontinued OTC NSAIDs (ibuprofen, Aleve); can use Tylenol as needed.    DIABETES:   Educated on risks associated with prolonged elevated blood sugar, including potential for kidney damage and retinopathy.   Explained benefits of tighter blood sugar control for surgical healing and infection risk reduction.   Recommend reducing carbohydrate intake, particularly from sources like cereal and French bread.   Started Metformin 500 mg extended-release, take 1 daily with a meal.   Ordered retinopathy screening (eye imaging).    JOINT PAIN:   Patient to use ice as a natural anti-inflammatory for joint pain.   Continued Diclofenac 75 mg, take 1 daily as needed for pain.    INSOMNIA:   Started Trazodone 50 mg, take 1-2 tablets at bedtime as needed for insomnia; may increase to 3 tablets if necessary.    FLU VACCINATION:   Administered flu vaccine.    LABS:   Ordered CBC, PSA, urine test, thyroid, and full panel to be done at [facility] in 6 months.    OTHER INSTRUCTIONS:   Performed EKG.    FOLLOW UP:   Follow up in 6 months for lab work and reassessment.   Contact the office if experiencing significant diarrhea or cramping from Metformin.       Medication List with Changes/Refills   New Medications    METFORMIN (GLUCOPHAGE-XR) 500 MG ER 24HR TABLET    Take 1 tablet (500 mg total) by mouth daily with breakfast.    TRAZODONE (DESYREL) 50 MG TABLET    Take 1-2 tablets ( mg total) by mouth nightly as needed for Insomnia.   Current Medications    DICLOFENAC (VOLTAREN) 75 MG EC TABLET    Take 1 tablet (75 mg total) by mouth 2 (two) times daily as needed (pain). for pain    EZETIMIBE-SIMVASTATIN 10-40 MG (VYTORIN) 10-40 MG PER TABLET    Take 1 tablet by mouth every evening.    ONETOUCH  "DELICA PLUS LANCET 33 GAUGE MISC    1 lancet by Misc.(Non-Drug; Combo Route) route 2 (two) times daily.    ONETOUCH VERIO IQ METER MISC    1 kit by Misc.(Non-Drug; Combo Route) route 2 (two) times daily.    ONETOUCH VERIO TEST STRIPS STRP    1 strip by Misc.(Non-Drug; Combo Route) route 2 (two) times daily.    TRAMADOL (ULTRAM) 50 MG TABLET    Take 1 tablet (50 mg total) by mouth every 4 (four) hours as needed for Pain.   Discontinued Medications    AMOXICILLIN (AMOXIL) 500 MG TAB    Twp tablets by mouth one hour prior to scheduled dental procedure followed by two tablets by mouth six hours after dental procedure.         Subjective:    Patient ID: Nael Mistry is a 79 y.o. male.  Chief Complaint: Follow-up (6 month/reg check up)    HPI  History of Present Illness    CHIEF COMPLAINT:  Patient presents today for routine 6-month follow-up and review of recent lab work.    DIABETES MANAGEMENT:  His recent A1c is 6.8%, increased from previous values of 5.9, 6.1, and 6.6. He is not on any diabetes medications, including Metformin. He reports dietary changes, reducing bread consumption to a French bread sandwich once every two weeks, eating half for lunch and half for dinner. He has a small bowl of Frosted Flakes for breakfast and indulges in glazed donuts once a month. This morning, his blood sugar was around 150. He identifies as a "meat and potatoes" eater, acknowledging infrequent consumption of fruits and vegetables.    MUSCULOSKELETAL CONCERNS:  He is scheduled for a left hip replacement. His previous right hip replacement improved his right knee pain. He reports left knee pain, believing it may be related to his hip issue, similar to his right side experience. X-rays showed remaining cartilage in the knees. He hopes addressing the left hip will alleviate the left knee pain. He takes diclofenac 75 mg as needed for pain management.    SLEEP ISSUES:  He reports difficulty staying asleep, waking up multiple " "times during the night. He goes to sleep at 10 PM and wakes up at 2 AM to use the bathroom. Melatonin was ineffective for his sleep issues.    BRUISING:  He experiences easy bruising, noting that bruises appear all over his body with minimal contact or after blood draws.    HEARING:  He underwent a hearing test within the past week. He describes his hearing as "shot," suggesting significant hearing impairment.    SURGICAL HISTORY:  He has a history of right hip replacement with favorable outcomes and no complications.      ROS:  ENT: +hearing loss  Musculoskeletal: +joint pain  Psychiatric: +sleep difficulty       Review of Systems    Objective:      Vitals:    10/11/24 0931   BP: 134/66   Pulse: 77   Resp: 18   Temp: 97.7 °F (36.5 °C)   TempSrc: Temporal   SpO2: 97%   Weight: 111 kg (244 lb 13.1 oz)   Height: 5' 10" (1.778 m)     BP Readings from Last 5 Encounters:   10/11/24 134/66   04/10/24 138/74   11/27/23 (!) 147/70   09/13/23 136/76   03/07/23 124/72     Wt Readings from Last 5 Encounters:   10/11/24 111 kg (244 lb 13.1 oz)   10/10/24 108.9 kg (240 lb)   05/02/24 107.7 kg (237 lb 7 oz)   04/10/24 107.7 kg (237 lb 7 oz)   01/09/24 102.1 kg (225 lb)     Physical Exam  Physical Exam    General: Well-developed. Well-nourished. No acute distress.  Eyes: EOMI. Sclerae anicteric.  HENT: Normocephalic. Atraumatic. Nares patent. Moist oral mucosa.  Cardiovascular: Regular rate. Regular rhythm. No murmurs. No rubs. No gallops. Normal S1, S2.  Respiratory: Normal respiratory effort. Clear to auscultation bilaterally. No rales. No rhonchi. No wheezing. Lungs clear to auscultation.  Musculoskeletal: No  obvious deformity.  Extremities: No lower extremity edema.  Neurological: Alert & oriented x3. No slurred speech. Normal gait.  Psychiatric: Normal mood. Normal affect. Good insight. Good judgment.  Skin: Warm. Dry. No rash.         Lab Results   Component Value Date    WBC 5.8 03/01/2024    HGB 13.3 03/01/2024    HCT " 40.2 03/01/2024     03/01/2024    CHOL 153 03/01/2024    TRIG 140 03/01/2024    HDL 50 03/01/2024    ALT 35 10/04/2024    AST 26 10/04/2024     10/04/2024    K 4.4 10/04/2024     10/04/2024    CREATININE 1.24 10/04/2024    BUN 23 10/04/2024    CO2 28 10/04/2024    TSH 4.38 03/01/2024    PSA 1.6 02/01/2019    HGBA1C 6.8 (H) 10/04/2024    MICROALBUR <0.2 09/08/2023      This note was generated with the assistance of ambient listening technology. Verbal consent was obtained by the patient and accompanying visitor(s) for the recording of patient appointment to facilitate this note. I attest to having reviewed and edited the generated note for accuracy, though some syntax or spelling errors may persist. Please contact the author of this note for any clarification.

## 2024-10-11 NOTE — PROGRESS NOTES
Nael Mistry is a 79 y.o. male here for a diabetic eye screening with non-dilated fundus photos per .    Patient cooperative?: Yes  Small pupils?: No  Last eye exam:     For exam results, see Encounter Report.

## 2024-10-11 NOTE — TELEPHONE ENCOUNTER
----- Message from Med Assistant Roberson sent at 10/11/2024  8:19 AM CDT -----  Please call patient to schedule surgery.

## 2024-10-14 DIAGNOSIS — M16.12 PRIMARY OSTEOARTHRITIS OF LEFT HIP: Primary | ICD-10-CM

## 2024-10-14 DIAGNOSIS — Z01.818 PRE-OP TESTING: ICD-10-CM

## 2024-10-14 RX ORDER — CEFAZOLIN SODIUM 2 G/50ML
2 SOLUTION INTRAVENOUS
OUTPATIENT
Start: 2024-10-14

## 2024-10-15 ENCOUNTER — PATIENT MESSAGE (OUTPATIENT)
Dept: ORTHOPEDICS | Facility: CLINIC | Age: 79
End: 2024-10-15
Payer: MEDICARE

## 2024-10-30 ENCOUNTER — HOSPITAL ENCOUNTER (OUTPATIENT)
Dept: RADIOLOGY | Facility: HOSPITAL | Age: 79
Discharge: HOME | End: 2024-10-30
Attending: ORTHOPAEDIC SURGERY
Payer: MEDICARE

## 2024-10-30 ENCOUNTER — HOSPITAL ENCOUNTER (OUTPATIENT)
Dept: PREADMISSION TESTING | Facility: HOSPITAL | Age: 79
Discharge: HOME | End: 2024-10-30
Attending: ORTHOPAEDIC SURGERY
Payer: MEDICARE

## 2024-10-30 VITALS — BODY MASS INDEX: 34.15 KG/M2 | WEIGHT: 238 LBS

## 2024-10-30 DIAGNOSIS — Z01.818 PREOP TESTING: Primary | ICD-10-CM

## 2024-10-30 DIAGNOSIS — Z01.818 PRE-OP TESTING: ICD-10-CM

## 2024-10-30 DIAGNOSIS — M16.12 PRIMARY OSTEOARTHRITIS OF LEFT HIP: ICD-10-CM

## 2024-10-30 LAB
ABO + RH BLD: NORMAL
BLD GP AB SCN CELLS X3 SERPL QL: NORMAL
SPECIMEN OUTDATE: NORMAL

## 2024-10-30 PROCEDURE — 86901 BLOOD TYPING SEROLOGIC RH(D): CPT | Performed by: ORTHOPAEDIC SURGERY

## 2024-10-30 PROCEDURE — 73502 X-RAY EXAM HIP UNI 2-3 VIEWS: CPT | Mod: 26,RT,, | Performed by: RADIOLOGY

## 2024-10-30 PROCEDURE — 36415 COLL VENOUS BLD VENIPUNCTURE: CPT | Performed by: ORTHOPAEDIC SURGERY

## 2024-10-30 PROCEDURE — 86900 BLOOD TYPING SEROLOGIC ABO: CPT | Performed by: ORTHOPAEDIC SURGERY

## 2024-10-30 PROCEDURE — 73502 X-RAY EXAM HIP UNI 2-3 VIEWS: CPT | Mod: TC,RT

## 2024-10-31 DIAGNOSIS — M16.12 PRIMARY OSTEOARTHRITIS OF LEFT HIP: Primary | ICD-10-CM

## 2024-11-04 ENCOUNTER — PATIENT MESSAGE (OUTPATIENT)
Dept: RESEARCH | Facility: HOSPITAL | Age: 79
End: 2024-11-04
Payer: MEDICARE

## 2024-11-06 DIAGNOSIS — M16.12 PRIMARY OSTEOARTHRITIS OF LEFT HIP: Primary | ICD-10-CM

## 2024-11-06 RX ORDER — OXYCODONE AND ACETAMINOPHEN 7.5; 325 MG/1; MG/1
1 TABLET ORAL EVERY 6 HOURS PRN
Qty: 28 TABLET | Refills: 0 | Status: SHIPPED | OUTPATIENT
Start: 2024-11-06

## 2024-11-06 RX ORDER — GABAPENTIN 300 MG/1
300 CAPSULE ORAL 2 TIMES DAILY
Qty: 60 CAPSULE | Refills: 0 | Status: SHIPPED | OUTPATIENT
Start: 2024-11-06 | End: 2024-12-06

## 2024-11-06 RX ORDER — CELECOXIB 200 MG/1
200 CAPSULE ORAL 2 TIMES DAILY
Qty: 60 CAPSULE | Refills: 0 | Status: SHIPPED | OUTPATIENT
Start: 2024-11-06 | End: 2024-12-06

## 2024-11-06 RX ORDER — ONDANSETRON 4 MG/1
4 TABLET, FILM COATED ORAL EVERY 6 HOURS PRN
Qty: 10 TABLET | Refills: 0 | Status: SHIPPED | OUTPATIENT
Start: 2024-11-06

## 2024-11-06 RX ORDER — DOCUSATE SODIUM 100 MG/1
100 CAPSULE, LIQUID FILLED ORAL 2 TIMES DAILY
Qty: 60 CAPSULE | Refills: 0 | Status: SHIPPED | OUTPATIENT
Start: 2024-11-06 | End: 2024-12-06

## 2024-11-06 RX ORDER — ASPIRIN 81 MG/1
81 TABLET ORAL EVERY 12 HOURS
Qty: 60 TABLET | Refills: 0 | Status: SHIPPED | OUTPATIENT
Start: 2024-11-06 | End: 2024-12-06

## 2024-11-08 ENCOUNTER — ANESTHESIA EVENT (OUTPATIENT)
Dept: SURGERY | Facility: HOSPITAL | Age: 79
End: 2024-11-08
Payer: MEDICARE

## 2024-11-11 ENCOUNTER — ANESTHESIA (OUTPATIENT)
Dept: SURGERY | Facility: HOSPITAL | Age: 79
End: 2024-11-11
Payer: MEDICARE

## 2024-11-11 ENCOUNTER — HOSPITAL ENCOUNTER (OUTPATIENT)
Facility: HOSPITAL | Age: 79
Discharge: HOME | End: 2024-11-11
Attending: ORTHOPAEDIC SURGERY | Admitting: ORTHOPAEDIC SURGERY
Payer: MEDICARE

## 2024-11-11 DIAGNOSIS — Z01.818 PRE-OP TESTING: ICD-10-CM

## 2024-11-11 DIAGNOSIS — M16.12 PRIMARY OSTEOARTHRITIS OF LEFT HIP: Primary | ICD-10-CM

## 2024-11-11 DIAGNOSIS — Z01.818 PREOP TESTING: ICD-10-CM

## 2024-11-11 PROCEDURE — 25000003 PHARM REV CODE 250: Performed by: ANESTHESIOLOGY

## 2024-11-11 PROCEDURE — 71000015 HC POSTOP RECOV 1ST HR: Performed by: ORTHOPAEDIC SURGERY

## 2024-11-11 PROCEDURE — 63600175 PHARM REV CODE 636 W HCPCS: Performed by: NURSE ANESTHETIST, CERTIFIED REGISTERED

## 2024-11-11 PROCEDURE — 71000039 HC RECOVERY, EACH ADD'L HOUR: Performed by: ORTHOPAEDIC SURGERY

## 2024-11-11 PROCEDURE — 27200688 HC TRAY, SPINAL-HYPER/ ISOBARIC: Performed by: ANESTHESIOLOGY

## 2024-11-11 PROCEDURE — C1769 GUIDE WIRE: HCPCS | Performed by: ORTHOPAEDIC SURGERY

## 2024-11-11 PROCEDURE — 94799 UNLISTED PULMONARY SVC/PX: CPT

## 2024-11-11 PROCEDURE — 63600175 PHARM REV CODE 636 W HCPCS: Performed by: ORTHOPAEDIC SURGERY

## 2024-11-11 PROCEDURE — 25000003 PHARM REV CODE 250: Performed by: NURSE ANESTHETIST, CERTIFIED REGISTERED

## 2024-11-11 PROCEDURE — 36000710: Performed by: ORTHOPAEDIC SURGERY

## 2024-11-11 PROCEDURE — 27130 TOTAL HIP ARTHROPLASTY: CPT | Mod: LT,,, | Performed by: ORTHOPAEDIC SURGERY

## 2024-11-11 PROCEDURE — 97116 GAIT TRAINING THERAPY: CPT

## 2024-11-11 PROCEDURE — 63600175 PHARM REV CODE 636 W HCPCS: Mod: JZ,JG | Performed by: ANESTHESIOLOGY

## 2024-11-11 PROCEDURE — 27201423 OPTIME MED/SURG SUP & DEVICES STERILE SUPPLY: Performed by: ORTHOPAEDIC SURGERY

## 2024-11-11 PROCEDURE — 63600175 PHARM REV CODE 636 W HCPCS: Mod: JZ,JG | Performed by: NURSE ANESTHETIST, CERTIFIED REGISTERED

## 2024-11-11 PROCEDURE — 37000009 HC ANESTHESIA EA ADD 15 MINS: Performed by: ORTHOPAEDIC SURGERY

## 2024-11-11 PROCEDURE — 71000033 HC RECOVERY, INTIAL HOUR: Performed by: ORTHOPAEDIC SURGERY

## 2024-11-11 PROCEDURE — 97165 OT EVAL LOW COMPLEX 30 MIN: CPT

## 2024-11-11 PROCEDURE — 71000016 HC POSTOP RECOV ADDL HR: Performed by: ORTHOPAEDIC SURGERY

## 2024-11-11 PROCEDURE — 36000711: Performed by: ORTHOPAEDIC SURGERY

## 2024-11-11 PROCEDURE — 37000008 HC ANESTHESIA 1ST 15 MINUTES: Performed by: ORTHOPAEDIC SURGERY

## 2024-11-11 PROCEDURE — C1776 JOINT DEVICE (IMPLANTABLE): HCPCS | Performed by: ORTHOPAEDIC SURGERY

## 2024-11-11 PROCEDURE — 97110 THERAPEUTIC EXERCISES: CPT

## 2024-11-11 PROCEDURE — 97161 PT EVAL LOW COMPLEX 20 MIN: CPT

## 2024-11-11 PROCEDURE — D9220A PRA ANESTHESIA: Mod: ANES,,, | Performed by: ANESTHESIOLOGY

## 2024-11-11 PROCEDURE — D9220A PRA ANESTHESIA: Mod: CRNA,,, | Performed by: NURSE ANESTHETIST, CERTIFIED REGISTERED

## 2024-11-11 DEVICE — PINNACLE HIP SOLUTIONS ALTRX POLYETHYLENE ACETABULAR LINER +4 10 DEGREE 36MM ID 56MM OD
Type: IMPLANTABLE DEVICE | Site: HIP | Status: FUNCTIONAL
Brand: PINNACLE ALTRX

## 2024-11-11 DEVICE — PINNACLE GRIPTION ACETABULAR SHELL SECTOR 56MM OD
Type: IMPLANTABLE DEVICE | Site: HIP | Status: FUNCTIONAL
Brand: PINNACLE GRIPTION

## 2024-11-11 DEVICE — SUMMIT FEMORAL STEM 12/14 TAPER TAPER ED W/POROCOAT SIZE 3 HI 135MM
Type: IMPLANTABLE DEVICE | Site: HIP | Status: FUNCTIONAL
Brand: SUMMIT POROCOAT

## 2024-11-11 DEVICE — BIOLOX DELTA CERAMIC FEMORAL HEAD +1.5 36MM DIA 12/14 TAPER
Type: IMPLANTABLE DEVICE | Site: HIP | Status: FUNCTIONAL
Brand: BIOLOX DELTA

## 2024-11-11 RX ORDER — METOCLOPRAMIDE HYDROCHLORIDE 5 MG/ML
10 INJECTION INTRAMUSCULAR; INTRAVENOUS EVERY 10 MIN PRN
Status: DISCONTINUED | OUTPATIENT
Start: 2024-11-11 | End: 2024-11-11 | Stop reason: HOSPADM

## 2024-11-11 RX ORDER — CEFAZOLIN 2 G/1
2 INJECTION, POWDER, FOR SOLUTION INTRAMUSCULAR; INTRAVENOUS
Status: COMPLETED | OUTPATIENT
Start: 2024-11-11 | End: 2024-11-11

## 2024-11-11 RX ORDER — DEXAMETHASONE SODIUM PHOSPHATE 4 MG/ML
INJECTION, SOLUTION INTRA-ARTICULAR; INTRALESIONAL; INTRAMUSCULAR; INTRAVENOUS; SOFT TISSUE
Status: DISCONTINUED | OUTPATIENT
Start: 2024-11-11 | End: 2024-11-11

## 2024-11-11 RX ORDER — ZOLPIDEM TARTRATE 5 MG/1
5 TABLET ORAL NIGHTLY PRN
Status: CANCELLED | OUTPATIENT
Start: 2024-11-11

## 2024-11-11 RX ORDER — SODIUM CHLORIDE 0.9 % (FLUSH) 0.9 %
5 SYRINGE (ML) INJECTION
Status: DISCONTINUED | OUTPATIENT
Start: 2024-11-11 | End: 2024-11-11 | Stop reason: HOSPADM

## 2024-11-11 RX ORDER — OXYCODONE HYDROCHLORIDE 5 MG/1
5 TABLET ORAL ONCE AS NEEDED
Status: COMPLETED | OUTPATIENT
Start: 2024-11-11 | End: 2024-11-11

## 2024-11-11 RX ORDER — ONDANSETRON HYDROCHLORIDE 2 MG/ML
INJECTION, SOLUTION INTRAMUSCULAR; INTRAVENOUS
Status: DISCONTINUED | OUTPATIENT
Start: 2024-11-11 | End: 2024-11-11

## 2024-11-11 RX ORDER — LIDOCAINE HYDROCHLORIDE 10 MG/ML
1 INJECTION, SOLUTION EPIDURAL; INFILTRATION; INTRACAUDAL; PERINEURAL ONCE
Status: DISCONTINUED | OUTPATIENT
Start: 2024-11-11 | End: 2024-11-11 | Stop reason: HOSPADM

## 2024-11-11 RX ORDER — FENTANYL CITRATE 50 UG/ML
25 INJECTION, SOLUTION INTRAMUSCULAR; INTRAVENOUS EVERY 5 MIN PRN
Status: DISCONTINUED | OUTPATIENT
Start: 2024-11-11 | End: 2024-11-11 | Stop reason: HOSPADM

## 2024-11-11 RX ORDER — PROPOFOL 10 MG/ML
VIAL (ML) INTRAVENOUS CONTINUOUS PRN
Status: DISCONTINUED | OUTPATIENT
Start: 2024-11-11 | End: 2024-11-11

## 2024-11-11 RX ORDER — LIDOCAINE HYDROCHLORIDE 20 MG/ML
INJECTION INTRAVENOUS
Status: DISCONTINUED | OUTPATIENT
Start: 2024-11-11 | End: 2024-11-11

## 2024-11-11 RX ORDER — MIDAZOLAM HYDROCHLORIDE 1 MG/ML
INJECTION INTRAMUSCULAR; INTRAVENOUS
Status: DISCONTINUED | OUTPATIENT
Start: 2024-11-11 | End: 2024-11-11

## 2024-11-11 RX ORDER — HYDROCODONE BITARTRATE AND ACETAMINOPHEN 5; 325 MG/1; MG/1
1 TABLET ORAL EVERY 4 HOURS PRN
Status: CANCELLED | OUTPATIENT
Start: 2024-11-11

## 2024-11-11 RX ORDER — DEXTROSE MONOHYDRATE AND SODIUM CHLORIDE 5; .45 G/100ML; G/100ML
INJECTION, SOLUTION INTRAVENOUS CONTINUOUS
Status: CANCELLED | OUTPATIENT
Start: 2024-11-11

## 2024-11-11 RX ORDER — OXYCODONE HCL 10 MG/1
10 TABLET, FILM COATED, EXTENDED RELEASE ORAL
Status: COMPLETED | OUTPATIENT
Start: 2024-11-11 | End: 2024-11-11

## 2024-11-11 RX ORDER — CEFAZOLIN SODIUM 1 G/50ML
1 SOLUTION INTRAVENOUS
Status: CANCELLED | OUTPATIENT
Start: 2024-11-11 | End: 2024-11-12

## 2024-11-11 RX ORDER — SODIUM CHLORIDE, SODIUM LACTATE, POTASSIUM CHLORIDE, CALCIUM CHLORIDE 600; 310; 30; 20 MG/100ML; MG/100ML; MG/100ML; MG/100ML
INJECTION, SOLUTION INTRAVENOUS CONTINUOUS
Status: DISCONTINUED | OUTPATIENT
Start: 2024-11-11 | End: 2024-11-11 | Stop reason: HOSPADM

## 2024-11-11 RX ORDER — FAMOTIDINE 20 MG/1
20 TABLET, FILM COATED ORAL 2 TIMES DAILY
Status: CANCELLED | OUTPATIENT
Start: 2024-11-11

## 2024-11-11 RX ORDER — PROPOFOL 10 MG/ML
VIAL (ML) INTRAVENOUS
Status: DISCONTINUED | OUTPATIENT
Start: 2024-11-11 | End: 2024-11-11

## 2024-11-11 RX ORDER — FENTANYL CITRATE 50 UG/ML
INJECTION, SOLUTION INTRAMUSCULAR; INTRAVENOUS
Status: DISCONTINUED | OUTPATIENT
Start: 2024-11-11 | End: 2024-11-11

## 2024-11-11 RX ORDER — TRANEXAMIC ACID 100 MG/ML
INJECTION, SOLUTION INTRAVENOUS
Status: DISCONTINUED | OUTPATIENT
Start: 2024-11-11 | End: 2024-11-11

## 2024-11-11 RX ORDER — ESMOLOL HYDROCHLORIDE 10 MG/ML
INJECTION INTRAVENOUS
Status: DISCONTINUED | OUTPATIENT
Start: 2024-11-11 | End: 2024-11-11

## 2024-11-11 RX ORDER — DEXMEDETOMIDINE HYDROCHLORIDE 100 UG/ML
INJECTION, SOLUTION INTRAVENOUS
Status: DISCONTINUED | OUTPATIENT
Start: 2024-11-11 | End: 2024-11-11

## 2024-11-11 RX ORDER — BISACODYL 10 MG/1
10 SUPPOSITORY RECTAL DAILY
Status: CANCELLED | OUTPATIENT
Start: 2024-11-11 | End: 2024-11-14

## 2024-11-11 RX ORDER — MUPIROCIN 20 MG/G
1 OINTMENT TOPICAL 2 TIMES DAILY
Status: CANCELLED | OUTPATIENT
Start: 2024-11-11 | End: 2024-11-16

## 2024-11-11 RX ORDER — BUPIVACAINE HYDROCHLORIDE 7.5 MG/ML
INJECTION, SOLUTION EPIDURAL; RETROBULBAR
Status: DISCONTINUED | OUTPATIENT
Start: 2024-11-11 | End: 2024-11-11

## 2024-11-11 RX ORDER — ACETAMINOPHEN 500 MG
1000 TABLET ORAL
Status: COMPLETED | OUTPATIENT
Start: 2024-11-11 | End: 2024-11-11

## 2024-11-11 RX ADMIN — ACETAMINOPHEN 1000 MG: 500 TABLET ORAL at 06:11

## 2024-11-11 RX ADMIN — ONDANSETRON 4 MG: 2 INJECTION INTRAMUSCULAR; INTRAVENOUS at 07:11

## 2024-11-11 RX ADMIN — OXYCODONE HYDROCHLORIDE 10 MG: 10 TABLET, FILM COATED, EXTENDED RELEASE ORAL at 06:11

## 2024-11-11 RX ADMIN — ESMOLOL HYDROCHLORIDE 5 MG: 10 INJECTION, SOLUTION INTRAVENOUS at 08:11

## 2024-11-11 RX ADMIN — DEXMEDETOMIDINE HYDROCHLORIDE 1 MCG: 100 INJECTION, SOLUTION INTRAVENOUS at 09:11

## 2024-11-11 RX ADMIN — FENTANYL CITRATE 50 MCG: 0.05 INJECTION, SOLUTION INTRAMUSCULAR; INTRAVENOUS at 07:11

## 2024-11-11 RX ADMIN — DEXMEDETOMIDINE HYDROCHLORIDE 1 MCG: 100 INJECTION, SOLUTION INTRAVENOUS at 08:11

## 2024-11-11 RX ADMIN — BUPIVACAINE HYDROCHLORIDE 1.4 ML: 7.5 INJECTION, SOLUTION EPIDURAL; RETROBULBAR at 07:11

## 2024-11-11 RX ADMIN — GLYCOPYRROLATE 0.1 MG: 0.2 INJECTION, SOLUTION INTRAMUSCULAR; INTRAVITREAL at 07:11

## 2024-11-11 RX ADMIN — TRANEXAMIC ACID 1000 MG: 100 INJECTION, SOLUTION INTRAVENOUS at 07:11

## 2024-11-11 RX ADMIN — MIDAZOLAM HYDROCHLORIDE 2 MG: 1 INJECTION, SOLUTION INTRAMUSCULAR; INTRAVENOUS at 07:11

## 2024-11-11 RX ADMIN — SODIUM CHLORIDE, SODIUM GLUCONATE, SODIUM ACETATE, POTASSIUM CHLORIDE AND MAGNESIUM CHLORIDE: 526; 502; 368; 37; 30 INJECTION, SOLUTION INTRAVENOUS at 06:11

## 2024-11-11 RX ADMIN — PROPOFOL 50 MCG/KG/MIN: 10 INJECTION, EMULSION INTRAVENOUS at 07:11

## 2024-11-11 RX ADMIN — OXYCODONE 5 MG: 5 TABLET ORAL at 10:11

## 2024-11-11 RX ADMIN — FENTANYL CITRATE 25 MCG: 0.05 INJECTION, SOLUTION INTRAMUSCULAR; INTRAVENOUS at 08:11

## 2024-11-11 RX ADMIN — LIDOCAINE HYDROCHLORIDE 20 MG: 20 INJECTION, SOLUTION INTRAVENOUS at 07:11

## 2024-11-11 RX ADMIN — PROPOFOL 20 MG: 10 INJECTION, EMULSION INTRAVENOUS at 07:11

## 2024-11-11 RX ADMIN — OXYCODONE 5 MG: 5 TABLET ORAL at 01:11

## 2024-11-11 RX ADMIN — CEFAZOLIN 2 G: 2 INJECTION, POWDER, FOR SOLUTION INTRAMUSCULAR; INTRAVENOUS at 07:11

## 2024-11-11 RX ADMIN — DEXAMETHASONE SODIUM PHOSPHATE 4 MG: 4 INJECTION, SOLUTION INTRA-ARTICULAR; INTRALESIONAL; INTRAMUSCULAR; INTRAVENOUS; SOFT TISSUE at 08:11

## 2024-11-11 NOTE — TRANSFER OF CARE
"Anesthesia Transfer of Care Note    Patient: Nael Mistry    Procedure(s) Performed: Procedure(s) (LRB):  ARTHROPLASTY, HIP REPLACEMENT, LEFT (Left)    Patient location: PACU    Anesthesia Type: spinal    Transport from OR: Transported from OR on room air with adequate spontaneous ventilation    Post pain: adequate analgesia    Post assessment: no apparent anesthetic complications and tolerated procedure well    Post vital signs: stable    Level of consciousness: awake    Nausea/Vomiting: no nausea/vomiting    Transfer of care protocol was followed    Last vitals: Visit Vitals  BP (!) 182/86 (BP Location: Left arm, Patient Position: Lying)   Pulse 67   Temp 36.6 °C (97.9 °F) (Temporal)   Resp 18   Ht 5' 10" (1.778 m)   Wt 106.1 kg (234 lb)   SpO2 98%   BMI 33.58 kg/m²     "

## 2024-11-11 NOTE — PT/OT/SLP EVAL
Occupational Therapy   Evaluation and Discharge Note    Name: Nael Mistry  MRN: [id number]  Admitting Diagnosis: <principal problem not specified>  Recent Surgery: Procedure(s) (LRB):  ARTHROPLASTY, HIP REPLACEMENT (Left) Day of Surgery    Recommendations:     Discharge Recommendations: Low Intensity Therapy  Discharge Equipment Recommendations: none  Barriers to discharge:  None    Assessment:     Nael Mistry is a 79 y.o. male with a medical diagnosis of left INDY. Pt was at post 06 post L INDY. Pt has abd pillow at chair side. Pt able to name all posterior LH precautions. Pt required set up A with UBD, mod A with LBD and min A with toileting for clothing management. Pt left seated in chair, wife present and nurse notified.     Plan:     During this hospitalization, patient does not require further acute OT services.  Please re-consult if situation changes.      Subjective     Chief Complaint: None stated  Patient/Family Comments/goals: Patient agreed to participate in OT.   Pt stated had R INDY 1 year ago and did well with recovery and has the DME.     Occupational Profile:  Living Environment: Pt lives with his wife in a H.  Previous level of function: Independent   Equipment Used at home: RW  Assistance upon Discharge: wife    Pain/Comfort:  Pain Rating 1: 5/10  Location - Side 1: Left  Location 1: hip    Patients cultural, spiritual, Advent conflicts given the current situation:      Objective:     Communicated with: nurse prior to session.  Patient found up in chair upon OT entry to room.    General Precautions: Standard, fall  Orthopedic Precautions: LLE weight bearing as tolerated, LLE posterior precautions  Braces:  abduction pillow  Respiratory Status: Room air     Occupational Performance:    Functional Mobility/Transfers:  Patient completed Sit <> Stand Transfer with minimum assistance with rolling walker   Patient completed Toilet Transfer Step Transfer technique with minimum  assistance with rolling walker  Functional Mobility: ~ 50 feet using a RW with CGA    Activities of Daily Living:  Upper Body Dressing: set up A/supervision  Lower Body Dressing: moderate assistance  Toileting: minimum assistance for clothing managment    Cognitive/Visual Perceptual:  Cognitive/Psychosocial Skills:  -       Oriented to: Person, Place, Time, and Situation   -       Follows Commands/attention: Follows multistep commands  -       Communication: clear/fluent  -       Memory: No Deficits noted  -       Safety awareness/insight to disability: intact   -       Mood/Affect/Coping skills/emotional control: Cooperative and Pleasant    Physical Exam:  Upper Extremity Range of Motion:  -       Right Upper Extremity: WFL  -       Left Upper Extremity: WFL  Upper Extremity Strength: -       Right Upper Extremity: WFL  -       Left Upper Extremity: WFL    AMPAC 6 Click ADL:  AMPAC Total Score:      Treatment & Education:  Educated on Posterior hip precautions - patient recalled hip precautions  Educated on safety with functional mobility; hand placement to ensure safe transfers to various surfaces in prep for ADLs  Educated on performing functional mobility and ADL's in adherence to orthopedic precautions.    Patient left up in chair with all lines intact, nurse notified and patient's wife present.    GOALS:   Multidisciplinary Problems       Occupational Therapy Goals       Not on file                    History:     Past Medical History:   Diagnosis Date    Cancer 01/01/1992    colon    Cancer of bladder 2005    Hyperlipidemia     Postoperative urinary complication     trouble urinating after anesthesia    Pre-diabetes          Past Surgical History:   Procedure Laterality Date    BLADDER SURGERY      CATARACT EXTRACTION Bilateral 02/2023    COLONOSCOPY      COLONOSCOPY N/A 11/24/2020    Procedure: COLONOSCOPY;  Surgeon: Andrew Horan MD;  Location: [facility];  Service: Endoscopy;  Laterality: N/A;    EYE  SURGERY      HERNIA REPAIR      left inguinal     HIP REPLACEMENT ARTHROPLASTY Right 11/27/2023    Procedure: ARTHROPLASTY, HIP REPLACEMENT, RIGHT;  Surgeon: Ian Jiménez MD;  Location: [facility];  Service: Orthopedics;  Laterality: Right;  Daniel       Time Tracking:     OT Date of Treatment: 11/11/24  OT Start Time: 1150  OT Stop Time: 1230  OT Total Time (min): 40 min    Billable Minutes:Evaluation 15  Self Care/Home Management 25    11/11/2024

## 2024-11-11 NOTE — PLAN OF CARE
Patient received from recovery at this time.  AAOX3.  NAD noted.  Dressing to left hip remains clean, dry and intact. Tolerating po intake well with no complaints of nausea/vomiting.  Patient able to move BLE with no problems noted.  Due to void. Medications given to spouse in the waiting area and brought to bedside.  Rolling walker brought to bedside.

## 2024-11-11 NOTE — ANESTHESIA PREPROCEDURE EVALUATION
11/11/2024  Nael Mistry is a 79 y.o., male.      Pre-op Assessment    I have reviewed the Patient Summary Reports.     I have reviewed the Nursing Notes. I have reviewed the NPO Status.   I have reviewed the Medications.     Review of Systems  Anesthesia Hx:  No problems with previous Anesthesia                Hematology/Oncology:                        --  Cancer in past history:       Other (see Oncology comments)       surgery   Oncology Comments: Colon and bladder CA      Cardiovascular:  Exercise tolerance: good              hyperlipidemia                               Renal/:  Chronic Renal Disease, CKD        Kidney Function/Disease             Musculoskeletal:  Arthritis        Arthritis          Neurological:  Neurology Normal              Arthritis                           Endocrine:  Diabetes Hypothyroidism   Diabetes                   Hypothyroidism              Physical Exam  General: Well nourished, Cooperative, Alert and Oriented    Airway:  Mallampati: II   Mouth Opening: Normal  TM Distance: Normal  Tongue: Normal  Neck ROM: Normal ROM    Dental:  Intact    Chest/Lungs:  Clear to auscultation, Normal Respiratory Rate    Heart:  Rate: Normal      Anesthesia Plan  Type of Anesthesia, risks & benefits discussed:    Anesthesia Type: Spinal  Intra-op Monitoring Plan: Standard ASA Monitors  Post Op Pain Control Plan: multimodal analgesia and IV/PO Opioids PRN  Induction:  IV  Informed Consent: Informed consent signed with the Patient and all parties understand the risks and agree with anesthesia plan.  All questions answered. Patient consented to blood products? Yes  ASA Score: 3  Day of Surgery Review of History & Physical: H&P Update referred to the surgeon/provider.    Ready For Surgery From Anesthesia Perspective.     .

## 2024-11-11 NOTE — H&P
CC/Indication for Procedure: 79 y.o. male with Primary osteoarthritis of left hip [M16.12]  Pre-op testing [Z01.818].    Patient scheduled for MS TOTAL HIP ARTHROPLASTY [56302] (ARTHROPLASTY, HIP REPLACEMENT, LEFT).    Past Medical History:   Diagnosis Date    Cancer 01/01/1992    colon    Cancer of bladder 2005    Hyperlipidemia     Postoperative urinary complication     trouble urinating after anesthesia    Pre-diabetes      Past Surgical History:   Procedure Laterality Date    BLADDER SURGERY      CATARACT EXTRACTION Bilateral 02/2023    COLONOSCOPY      COLONOSCOPY N/A 11/24/2020    Procedure: COLONOSCOPY;  Surgeon: Andrew Horan MD;  Location: [facility];  Service: Endoscopy;  Laterality: N/A;    EYE SURGERY      HERNIA REPAIR      left inguinal     HIP REPLACEMENT ARTHROPLASTY Right 11/27/2023    Procedure: ARTHROPLASTY, HIP REPLACEMENT, RIGHT;  Surgeon: Ian Jiménez MD;  [facility] OR;  Service: Orthopedics;  Laterality: Right;  Daniel     Family History   Problem Relation Name Age of Onset    Alzheimer's disease Mother      Dementia Mother      Cancer Father          prostate     Heart disease Father      No Known Problems Brother       Social History     Socioeconomic History    Marital status:    Occupational History    Occupation: retired [employer]    Tobacco Use    Smoking status: Never    Smokeless tobacco: Never   Substance and Sexual Activity    Alcohol use: Yes     Alcohol/week: 5.0 standard drinks of alcohol     Types: 5 Cans of beer per week     Comment: 4-5 beer per week     Drug use: No    Sexual activity: Yes     Partners: Female     Birth control/protection: None       Review of patient's allergies indicates:  No Known Allergies      Current Facility-Administered Medications:     ceFAZolin 2 g, 2 g, Intravenous, On Call Procedure, Ian Jiménez MD    electrolyte-S (ISOLYTE), , Intravenous, Continuous, Blanco Jordan MD, Last Rate: 10 mL/hr at 11/11/24 0609, New Bag at 11/11/24  0609    lactated ringers infusion, , Intravenous, Continuous, Blanco Jordan MD    LIDOcaine (PF) 10 mg/ml (1%) injection 10 mg, 1 mL, Intradermal, Once, Blanco Jordan MD    tranexamic acid (CYKLOKAPRON) 1,000 mg in 0.9% NaCl 100 mL IVPB (MB+), 1,000 mg, Intravenous, On Call Procedure, Finger, MD Ian    ROS:    Denies chest pain or palpitations  Denies shortness of breath  Denies fevers or chills  Denies chest pain  Denies abdominal pain    PE:    General Appearance: Well nourished  Orientation: Oriented to time, place, person  Mental Status: Alert  Heart: RRR  Lungs: CTA  Abdomen: Soft and non-tender    Anesthesia/Surgery risks, benefits and alternative options discussed and understood by patient/family.    This note was created using Dragon voice recognition software that occasionally misinterpreted phrases or words.

## 2024-11-11 NOTE — PLAN OF CARE
Patient cleared by PT/OT to discharge to home. Dressing to left hip remains clean dry and intact  Medications delivered to bedside and reviewed with patient and spouse.  Discharge instructions given to pt and family/friend, verbalized understanding and questions answered. Handouts provided. Belongings given back to pt. IV removed- catheter intact. Discharge via wheelchair.

## 2024-11-11 NOTE — OP NOTE
[facility]  Surgery Department  Operative Note    SUMMARY     Date of Procedure: 11/11/2024     Procedure:  Left total hip arthroplasty    Surgeons and Role:     * Ian Jiménez MD - Primary    Assisting Surgeon:  Charles    Pre-Operative Diagnosis: Primary osteoarthritis of left hip [M16.12]  Pre-op testing [Z01.818]    Post-Operative Diagnosis: Post-Op Diagnosis Codes:     * Primary osteoarthritis of left hip [M16.12]     * Pre-op testing [Z01.818]    Anesthesia: Spinal    Intraoperative Findings:  Severe bone-on-bone arthritis left hip with severe contracture    Description of the Findings of the Procedure:  Patient was placed on the operative table lateral decubitus position.  He was well padded and protected.  He was prepped and draped in the usual sterile manner for surgery.  Posterior lateral approach was undertaken to the hip and carried down sharply through the skin.  The deep fascia was incised in line with its fibers.  The sciatic nerve was visualized and protected.  All bleeding was meticulously controlled.  The Charnley retractors were placed.  The short external rotators were taken down and tagged for later reattachment.  He was incredibly tight and this made exposure more difficult.  The femoral head was identified and a preliminary neck cut was made.  The head was removed it was noted to be extremely arthritic.  We now used the cookie cutter than the canal finer and then the lateralizer.  We now reamed to a three.  That gave us very good chatter.  I now broached and I broached to a 3 and that gave me very stable proximal fill.  We turned our attention now to the acetabulum.  Retractors were placed anteriorly and posteriorly being very careful not to get into neurovascular structures.  He was very deep and very tight.  The labrum was excised.  The foveal contents were cleaned.  We now medialized using a 48 mm Reamer to the level of the fovea.  I now expanded to a 55 and  that gave me good bleeding bone.  I tapped a 56 mm cup into position and I got an outstanding Press-Fit.  I tapped the liner into position.  We pulsed and irrigated and turned our attention back to the stem.  We began trialing.  His anatomy well matched the high offset and so I went to the high offset standard stem to start with.  That trialed just beautifully with excellent range of motion excellent stability.  We removed the trial components.  We pulsed and irrigated and tapped the actual components into position the construct trialed beautifully I pulsed and irrigated.  The sciatic nerve was noted to be in good position and condition.  We closed the short external rotators with 2. FiberWire.  The Charnley retractors were removed.  We closed the deep fascia with 2. FiberWire in a running 0 Quill.  We closed the subcu with 2-0 Quill and the skin with 4-0 Monocryl.  Sterile dressings were applied and the patient was noted to be in stable condition    Complications: No    Estimated Blood Loss (EBL): * No values recorded between 11/11/2024  8:03 AM and 11/11/2024  9:03 AM *           Implants:   Implant Name Type Inv. Item Serial No.  Lot No. LRB No. Used Action   CUP ACT PINNACLE SECTOR 56 TIT - [id number]  CUP ACT PINNACLE SECTOR 56 TIT  [employer] [id number] Left 1 Implanted   LINER ALTRX +4 10DEG [id number] - [id number]  LINER ALTRX +4 10DEG [id number]  [facility] [id number] Left 1 Implanted   STEM FEM HIGH OFFSET SZ 3 - [id number]  STEM FEM HIGH OFFSET SZ 3  [employer] [id number] Left 1 Implanted   HEAD FEM 12/14 TAPER 1.5X36 - [id number]  HEAD FEM 12/14 TAPER 1.5X36  [employer] [id number] Left 1 Implanted       Specimens:   Specimen (24h ago, onward)      None                    Condition: Good    Disposition: PACU - hemodynamically stable.              Discharge Note    SUMMARY     Admit Date: 11/11/2024    Discharge Date and Time:  11/11/2024 9:03 AM    Hospital Course (synopsis of major diagnoses, care, treatment, and  services provided during the course of the hospital stay): Uneventful      Final Diagnosis: Post-Op Diagnosis Codes:     * Primary osteoarthritis of left hip [M16.12]     * Pre-op testing [Z01.818]    Disposition: Home or Self Care    Follow Up/Patient Instructions:     Medications:  Reconciled Home Medications:   Current Discharge Medication List        CONTINUE these medications which have NOT CHANGED    Details   ezetimibe-simvastatin 10-40 mg (VYTORIN) 10-40 mg per tablet Take 1 tablet by mouth every evening.  Qty: 90 tablet, Refills: 2    Associated Diagnoses: Hypercholesteremia      traZODone (DESYREL) 50 MG tablet Take 1-2 tablets ( mg total) by mouth nightly as needed for Insomnia.  Qty: 180 tablet, Refills: 1    Associated Diagnoses: Chronic insomnia      aspirin (ECOTRIN) 81 MG EC tablet Take 1 tablet (81 mg total) by mouth every 12 (twelve) hours.  Qty: 60 tablet, Refills: 0    Associated Diagnoses: Primary osteoarthritis of left hip      celecoxib (CELEBREX) 200 MG capsule Take 1 capsule (200 mg total) by mouth 2 (two) times daily.  Qty: 60 capsule, Refills: 0    Associated Diagnoses: Primary osteoarthritis of left hip      docusate sodium (COLACE) 100 MG capsule Take 1 capsule (100 mg total) by mouth 2 (two) times daily.  Qty: 60 capsule, Refills: 0    Associated Diagnoses: Primary osteoarthritis of left hip      gabapentin (NEURONTIN) 300 MG capsule Take 1 capsule (300 mg total) by mouth 2 (two) times daily.  Qty: 60 capsule, Refills: 0    Associated Diagnoses: Primary osteoarthritis of left hip      metFORMIN (GLUCOPHAGE-XR) 500 MG ER 24hr tablet Take 1 tablet (500 mg total) by mouth daily with breakfast.  Qty: 90 tablet, Refills: 3    Associated Diagnoses: Type 2 diabetes mellitus with hyperlipidemia      ondansetron (ZOFRAN) 4 MG tablet Take 1 tablet (4 mg total) by mouth every 6 (six) hours as needed for Nausea.  Qty: 10 tablet, Refills: 0    Associated Diagnoses: Primary osteoarthritis of  left hip      ONETOUCH DELICA PLUS LANCET 33 gauge Misc 1 lancet by Misc.(Non-Drug; Combo Route) route 2 (two) times daily.  Qty: 100 each, Refills: 11    Associated Diagnoses: Type 2 diabetes mellitus with hyperlipidemia      ONETOUCH VERIO IQ METER Misc 1 kit by Misc.(Non-Drug; Combo Route) route 2 (two) times daily.  Qty: 1 each, Refills: 0    Associated Diagnoses: Type 2 diabetes mellitus with hyperlipidemia      ONETOUCH VERIO TEST STRIPS Strp 1 strip by Misc.(Non-Drug; Combo Route) route 2 (two) times daily.  Qty: 100 strip, Refills: 11    Associated Diagnoses: Type 2 diabetes mellitus with hyperlipidemia      oxyCODONE-acetaminophen (PERCOCET) 7.5-325 mg per tablet Take 1 tablet by mouth every 6 (six) hours as needed for Pain.  Qty: 28 tablet, Refills: 0    Comments: This prescription and the attached other prescriptions for postoperative use for the patient's scheduled total joint arthroplasty on Monday November 11, 2024.  This is for the meds to bed program.  Associated Diagnoses: Primary osteoarthritis of left hip           Discharge Procedure Orders   Diet general     Call MD for:  temperature >100.4     Call MD for:  persistent nausea and vomiting     Call MD for:  severe uncontrolled pain     Call MD for:  difficulty breathing, headache or visual disturbances     Call MD for:  redness, tenderness, or signs of infection (pain, swelling, redness, odor or green/yellow discharge around incision site)     Call MD for:  hives     Call MD for:  persistent dizziness or light-headedness     Call MD for:  extreme fatigue      Follow-up Information       [facility]).    Specialties: Home Health Services, Home Therapy Services, Home Living Aide Services  Why: As needed  Contact information:  [address]  [address]  [facility] [address] [phone]

## 2024-11-11 NOTE — ANESTHESIA PROCEDURE NOTES
Spinal    Diagnosis: osteoarthritis  Patient location during procedure: OR  Start time: 11/11/2024 7:30 AM  Timeout: 11/11/2024 7:30 AM  End time: 11/11/2024 7:35 AM    Staffing  Authorizing Provider: Mickey Gutierrez MD  Performing Provider: Mickey Gutierrez MD    Staffing  Performed by: Mickey Gutierrez MD  Authorized by: Mickey Gutierrez MD    Preanesthetic Checklist  Completed: patient identified, IV checked, site marked, risks and benefits discussed, surgical consent, monitors and equipment checked, pre-op evaluation and timeout performed  Spinal Block  Patient position: sitting  Prep: ChloraPrep  Patient monitoring: heart rate, cardiac monitor, continuous pulse ox, continuous capnometry and frequent blood pressure checks  Approach: midline  Location: L4-5  Injection technique: single shot  CSF Fluid: clear free-flowing CSF  Needle  Needle type: pencil-tip   Needle gauge: 25 G  Needle length: 3.5 in  Additional Documentation: incremental injection, negative aspiration for heme and no paresthesia on injection  Needle localization: anatomical landmarks  Assessment  Sensory level: T10   Dermatomal levels determined by alcohol wipe  Ease of block: easy  Patient's tolerance of the procedure: comfortable throughout block and no complaints  Medications:    Medications: bupivacaine (pf) (MARCAINE) injection 0.75% - Intraspinal   1.4 mL - 11/11/2024 7:35:00 AM

## 2024-11-11 NOTE — PLAN OF CARE
AAO x4. VSS. NAD. Resp E/U. Calm and cooperative. Speech appropriate. Tolerating clear liquids. Denies nausea. Pain controlled. IV patent and infusing. Dressing to Lt hip CDI. Abduction pillow and ice in place. Due to void. Family notified of patient status. All safety measures taken. Bed in low position. Bedrails up x2. Bed locked. Cleared by Anesthesia.

## 2024-11-11 NOTE — PT/OT/SLP EVAL
Physical Therapy Evaluation and Discharge Note    Patient Name:  Nael Mistry   MRN:  [id number]    Recommendations:     Discharge Recommendations: Low Intensity Therapy  Discharge Equipment Recommendations: none   Barriers to discharge: None    Assessment:     Nael Mistry is a 79 y.o. male admitted with a medical diagnosis of <principal problem not specified>. .pt seen at post 06 post L INDY. Pt has abd pillow on. Pt able to name all posterior LH precautions. Pt completed thera ex in supine. Min assist to sit EOB and to stand with RW. Pt ambulated 250ft with RW min/CGA with chair following but no seated rest required. OOB chair post PT.   LIT    Recent Surgery: Procedure(s) (LRB):  ARTHROPLASTY, HIP REPLACEMENT (Left) Day of Surgery    Plan:     During this hospitalization, patient does not require further acute PT services.  Please re-consult if situation changes.      Subjective   Stated that he feels good and  he does not have urge to use bathroom  Stated had R INDY 1 year ago and did well with recovery  Chief Complaint: none  Patient/Family Comments/goals: get well  Pain/Comfort:  Pain Rating 1: 0/10    Patients cultural, spiritual, Evangelical conflicts given the current situation:      Living Environment:  Home with spouse  Prior to admission, patients level of function was nurse Trevino.  Equipment used at home: none.  DME owned (not currently used): rolling walker.  Upon discharge, patient will have assistance from spouse.    Objective:     Communicated with nurse Coronado prior to session.  Patient found HOB elevated with hip abduction pillow upon PT entry to room.    General Precautions: Standard, fall    Orthopedic Precautions:LLE weight bearing as tolerated, LLE posterior precautions   Braces: N/A  Respiratory Status: Room air    Exams:  Postural Exam:  Patient presented with the following abnormalities:    -       Rounded shoulders  -       Forward head  -       BMI 33.58  RLE ROM: WFL  RLE  Strength: WFL  LLE ROM: Deficits: within L posterior hip precautions  LLE Strength: Deficits: 3-/5    Functional Mobility:  Bed Mobility:     Scooting: minimum assistance  Supine to Sit: minimum assistance  Transfers:     Sit to Stand:  minimum assistance with rolling walker  Bed to Chair: minimum assistance with  rolling walker  using  Stand Pivot  Gait: 250ft with min/CGA with RW with no seated rest    AM-PAC 6 CLICK MOBILITY  Total Score:16       Treatment and Education:  Patient was educated on the importance of OOB activity and functional mobility to negate negative effects of prolonged bed rest during hospitalization, safe transfers and ambulation, and D/C planning   Thera ex with AP,QS/GS,HS  Education on L posterior hip precautions and use of abd pillow  OOb chair post PT  No urge to void at this time    AM-PAC 6 CLICK MOBILITY  Total Score:16     Patient left up in chair with all lines intact, call button in reach, nurse Tab notified, and OT Verna present.    GOALS:   Multidisciplinary Problems       Physical Therapy Goals       Not on file                    History:     Past Medical History:   Diagnosis Date    Cancer 01/01/1992    colon    Cancer of bladder 2005    Hyperlipidemia     Postoperative urinary complication     trouble urinating after anesthesia    Pre-diabetes        Past Surgical History:   Procedure Laterality Date    BLADDER SURGERY      CATARACT EXTRACTION Bilateral 02/2023    COLONOSCOPY      COLONOSCOPY N/A 11/24/2020    Procedure: COLONOSCOPY;  Surgeon: Andrew Horan MD;  Location: [facility];  Service: Endoscopy;  Laterality: N/A;    EYE SURGERY      HERNIA REPAIR      left inguinal     HIP REPLACEMENT ARTHROPLASTY Right 11/27/2023    Procedure: ARTHROPLASTY, HIP REPLACEMENT, RIGHT;  Surgeon: Ian Jiménez MD;  Location: [facility];  Service: Orthopedics;  Laterality: Right;  Daniel       Time Tracking:     PT Received On: 11/11/24  PT Start Time: 1103     PT Stop Time: 1149  PT Total  Time (min): 46 min     Billable Minutes: Evaluation 10, Gait Training 20, and Therapeutic Exercise 16      11/11/2024

## 2024-11-11 NOTE — DISCHARGE INSTRUCTIONS
"Discharge Instructions: After Your Surgery/Procedure  Youve just had surgery. During surgery you were given medicine called anesthesia to keep you relaxed and free of pain. After surgery you may have some pain or nausea. This is common. Here are some tips for feeling better and getting well after surgery.     Stay on schedule with your medication.   Going home  Your doctor or nurse will show you how to take care of yourself when you go home. He or she will also answer your questions. Have an adult family member or friend drive you home.      For your safety we recommend these precaution for the first 24 hours after your procedure:  Do not drive or use heavy equipment.  Do not make important decisions or sign legal papers.  Do not drink alcohol.  Have someone stay with you, if needed. He or she can watch for problems and help keep you safe.  Your concentration, balance, coordination, and judgement may be impaired for many hours after anesthesia.  Use caution when ambulating or standing up.     You may feel weak and "washed out" after anesthesia and surgery.      Subtle residual effects of general anesthesia or sedation with regional / local anesthesia can last more than 24 hours.  Rest for the remainder of the day or longer if your Doctor/Surgeon has advised you to do so.  Although you may feel normal within the first 24 hours, your reflexes and mental ability may be impaired without you realizing it.  You may feel dizzy, lightheaded or sleepy for 24 hours or longer.      Be sure to go to all follow-up visits with your doctor. And rest after your surgery for as long as your doctor tells you to.  Coping with pain  If you have pain after surgery, pain medicine will help you feel better. Take it as told, before pain becomes severe. Also, ask your doctor or pharmacist about other ways to control pain. This might be with heat, ice, or relaxation. And follow any other instructions your surgeon or nurse gives you.  Tips " for taking pain medicine  To get the best relief possible, remember these points:  Pain medicines can upset your stomach. Taking them with a little food may help.  Most pain relievers taken by mouth need at least 20 to 30 minutes to start to work.  Taking medicine on a schedule can help you remember to take it. Try to time your medicine so that you can take it before starting an activity. This might be before you get dressed, go for a walk, or sit down for dinner.  Constipation is a common side effect of pain medicines. Call your doctor before taking any medicines such as laxatives or stool softeners to help ease constipation. Also ask if you should skip any foods. Drinking lots of fluids and eating foods such as fruits and vegetables that are high in fiber can also help. Remember, do not take laxatives unless your surgeon has prescribed them.  Drinking alcohol and taking pain medicine can cause dizziness and slow your breathing. It can even be deadly. Do not drink alcohol while taking pain medicine.  Pain medicine can make you react more slowly to things. Do not drive or run machinery while taking pain medicine.  Your health care provider may tell you to take acetaminophen to help ease your pain. Ask him or her how much you are supposed to take each day. Acetaminophen or other pain relievers may interact with your prescription medicines or other over-the-counter (OTC) drugs. Some prescription medicines have acetaminophen and other ingredients. Using both prescription and OTC acetaminophen for pain can cause you to overdose. Read the labels on your OTC medicines with care. This will help you to clearly know the list of ingredients, how much to take, and any warnings. It may also help you not take too much acetaminophen. If you have questions or do not understand the information, ask your pharmacist or health care provider to explain it to you before you take the OTC medicine.  Managing nausea  Some people have an  upset stomach after surgery. This is often because of anesthesia, pain, or pain medicine, or the stress of surgery. These tips will help you handle nausea and eat healthy foods as you get better. If you were on a special food plan before surgery, ask your doctor if you should follow it while you get better. These tips may help:  Do not push yourself to eat. Your body will tell you when to eat and how much.  Start off with clear liquids and soup. They are easier to digest.  Next try semi-solid foods, such as mashed potatoes, applesauce, and gelatin, as you feel ready.  Slowly move to solid foods. Dont eat fatty, rich, or spicy foods at first.  Do not force yourself to have 3 large meals a day. Instead eat smaller amounts more often.  Take pain medicines with a small amount of solid food, such as crackers or toast, to avoid nausea.     Call your surgeon if  You still have pain an hour after taking medicine. The medicine may not be strong enough.  You feel too sleepy, dizzy, or groggy. The medicine may be too strong.  You have side effects like nausea, vomiting, or skin changes, such as rash, itching, or hives.       If you have obstructive sleep apnea  You were given anesthesia medicine during surgery to keep you comfortable and free of pain. After surgery, you may have more apnea spells because of this medicine and other medicines you were given. The spells may last longer than usual.   At home:  Keep using the continuous positive airway pressure (CPAP) device when you sleep. Unless your health care provider tells you not to, use it when you sleep, day or night. CPAP is a common device used to treat obstructive sleep apnea.  Talk with your provider before taking any pain medicine, muscle relaxants, or sedatives. Your provider will tell you about the possible dangers of taking these medicines.  © 5259-7174 [employer]. [address] [place] [address], [place] [address]. All rights reserved. This information is  not intended as a substitute for professional medical care. Always follow your healthcare professional's instructions.                  Using an Incentive Spirometer    An incentive spirometer is a device that helps you do deep breathing exercises. These exercises expand your lungs, aid in circulation, and help prevent pneumonia. Deep breathing exercises also help you breathe better and improve the function of your lungs by:  Keeping your lungs clear  Strengthening your breathing muscles  Helping prevent respiratory complications or problems  The incentive spirometer gives you a way to take an active part in recover. A nurse or therapist will teach you breathing exercises. To do these exercises, you will breathe in through your mouth and not your nose. The incentive spirometer only works correctly if you breathe in through your mouth.  Steps to clear lungs  Step 1. Exhale normally. Then, inhale normally.  Relax and breathe out.  Step 2. Place your lips tightly around the mouthpiece.  Make sure the device is upright and not tilted.  Step 3. Inhale as much air as you can through the mouthpiece (don't breath through your nose).  Inhale slowly and deeply.  Hold your breath long enough to keep the balls or disk raised for at least 3 to 5 seconds, or as instructed by your healthcare provider.  Some spirometers have an indicator to let you know that you are breathing in too fast. If the indicator goes off, breathe in more slowly.  Step 4. Repeat the exercise regularly.  Do this exercise every hour while you're awake, or as instructed by your healthcare provider.  If you were taught deep breathing and coughing exercises, do them regularly as instructed by your healthcare provider.                   Post op instructions for prevention of DVT  What is deep vein thrombosis?  Deep vein thrombosis (DVT) is the medical term for blood clots in the deep veins of the leg.  These blood clots can be dangerous.  A DVT can block a blood  vessel and keep blood from getting where it needs to go.  Another problem is that the clot can travel to other parts of the body such as the lungs.  A clot that travels to the lungs is called a pulmonary embolus (PE) and can cause serious problems with breathing which can lead to death.  Am I at risk for DVT/PE?  If you are not very active, you are at risk of DVT.  Anyone confined to bed, sitting for long periods of time, recovering from surgery, etc. increases the risk of DVT.  Other risk factors are cancer diagnosis, certain medications, estrogen replacement in any form,older age, obesity, pregnancy, smoking, history of clotting disorders, and dehydration.  How will I know if I have a DVT?  Swelling in the lower leg  Pain  Warmth, redness, hardness or bulging of the vein  If you have any of these symptoms, call your doctors office right away.  Some people will not have any symptoms until the clot moves to the lungs.  What are the symptoms of a PE?  Panting, shortness of breath, or trouble breathing  Sharp, knife-like chest pain when you breathe  Coughing or coughing up blood  Rapid heartbeat  If you have any of these symptoms or get worse quickly, call 911 for emergency treatment.  How can I prevent a DVT?  Avoid long periods of inactivity and dont cross your legs--get up and walk around every hour or so.  Stay active--walking after surgery is highly encouraged.  This means you should get out of the house and walk in the neighborhood.  Going up and down stairs will not impair healing (unless advised against such activity by your doctor).    Drink plenty of noncaffeinated, nonalcoholic fluids each day to prevent dehydration.  Wear special support stockings, if they have been advised by your doctor.  If you travel, stop at least once an hour and walk around.  Avoid smoking (assistance with stopping is available through your healthcare provider)  Always notify your doctor if you are not able to follow the post  operative instructions that are given to you at the time of discharge.  It may be necessary to prescribe one of the medications available to prevent DVT.              We hope your stay was comfortable as you heal now, mend and rest.    For we have enjoyed taking care of you by giving your our best.    And as you get better, by regaining your health and strength;   We count it as a privilege to have served you and hope your time at [facility] was well spent.      Thank  You!!!

## 2024-11-11 NOTE — ANESTHESIA POSTPROCEDURE EVALUATION
Anesthesia Post Evaluation    Patient: Nael Mistry    Procedure(s) Performed: Procedure(s) (LRB):  ARTHROPLASTY, HIP REPLACEMENT (Left)    Final Anesthesia Type: spinal      Patient location during evaluation: PACU  Patient participation: Yes- Able to Participate  Level of consciousness: awake and alert  Post-procedure vital signs: reviewed and stable  Pain management: adequate  Airway patency: patent    PONV status at discharge: No PONV  Anesthetic complications: no      Cardiovascular status: hemodynamically stable  Respiratory status: unassisted and room air  Hydration status: euvolemic  Follow-up not needed.              Vitals Value Taken Time   /77 11/11/24 1100   Temp 36.5 °C (97.7 °F) 11/11/24 1015   Pulse 66 11/11/24 1100   Resp 18 11/11/24 1302   SpO2 100 % 11/11/24 1100         Event Time   Out of Recovery 10:30:00         Pain/John Score: Pain Rating Prior to Med Admin: 6 (11/11/2024  1:02 PM)  Pain Rating Post Med Admin: 4 (11/11/2024  1:20 PM)  John Score: 10 (11/11/2024 10:15 AM)  Modified John Score: 19 (11/11/2024 11:00 AM)

## 2024-11-12 ENCOUNTER — HOSPITAL ENCOUNTER (OUTPATIENT)
Dept: RADIOLOGY | Facility: HOSPITAL | Age: 79
Discharge: HOME | End: 2024-11-12
Attending: ORTHOPAEDIC SURGERY
Payer: MEDICARE

## 2024-11-12 ENCOUNTER — OFFICE VISIT (OUTPATIENT)
Dept: ORTHOPEDICS | Facility: CLINIC | Age: 79
End: 2024-11-12
Payer: MEDICARE

## 2024-11-12 VITALS
SYSTOLIC BLOOD PRESSURE: 164 MMHG | HEART RATE: 66 BPM | HEIGHT: 70 IN | TEMPERATURE: 98 F | BODY MASS INDEX: 33.5 KG/M2 | WEIGHT: 234 LBS | DIASTOLIC BLOOD PRESSURE: 77 MMHG | RESPIRATION RATE: 18 BRPM | OXYGEN SATURATION: 100 %

## 2024-11-12 VITALS — BODY MASS INDEX: 33.49 KG/M2 | WEIGHT: 233.94 LBS | HEIGHT: 70 IN

## 2024-11-12 DIAGNOSIS — Z96.641 S/P TOTAL RIGHT HIP ARTHROPLASTY: Primary | ICD-10-CM

## 2024-11-12 PROCEDURE — 1159F MED LIST DOCD IN RCRD: CPT | Mod: CPTII,S$GLB,, | Performed by: ORTHOPAEDIC SURGERY

## 2024-11-12 PROCEDURE — G0180 MD CERTIFICATION HHA PATIENT: HCPCS | Mod: ,,, | Performed by: ORTHOPAEDIC SURGERY

## 2024-11-12 PROCEDURE — 99999 PR PBB SHADOW E&M-EST. PATIENT-LVL III: CPT | Mod: PBBFAC,,, | Performed by: ORTHOPAEDIC SURGERY

## 2024-11-12 PROCEDURE — 72170 X-RAY EXAM OF PELVIS: CPT | Mod: 26,,, | Performed by: RADIOLOGY

## 2024-11-12 PROCEDURE — 1125F AMNT PAIN NOTED PAIN PRSNT: CPT | Mod: CPTII,S$GLB,, | Performed by: ORTHOPAEDIC SURGERY

## 2024-11-12 PROCEDURE — 1160F RVW MEDS BY RX/DR IN RCRD: CPT | Mod: CPTII,S$GLB,, | Performed by: ORTHOPAEDIC SURGERY

## 2024-11-12 PROCEDURE — 99024 POSTOP FOLLOW-UP VISIT: CPT | Mod: S$GLB,,, | Performed by: ORTHOPAEDIC SURGERY

## 2024-11-12 PROCEDURE — 72170 X-RAY EXAM OF PELVIS: CPT | Mod: TC,PN

## 2024-11-12 RX ORDER — HYDROMORPHONE HYDROCHLORIDE 4 MG/1
4 TABLET ORAL EVERY 6 HOURS PRN
Qty: 28 TABLET | Refills: 0 | Status: SHIPPED | OUTPATIENT
Start: 2024-11-12

## 2024-11-12 NOTE — PROGRESS NOTES
[facility]    Subjective:     Chief Complaint:   Chief Complaint   Patient presents with    Left Hip - Post-op Evaluation     Patient is here for a PO day one left INDY 11.11.24, states pain is controlled with medication        Past Medical History:   Diagnosis Date    Cancer 01/01/1992    colon    Cancer of bladder 2005    Hyperlipidemia     Postoperative urinary complication     trouble urinating after anesthesia    Pre-diabetes        Past Surgical History:   Procedure Laterality Date    BLADDER SURGERY      CATARACT EXTRACTION Bilateral 02/2023    COLONOSCOPY      COLONOSCOPY N/A 11/24/2020    Procedure: COLONOSCOPY;  Surgeon: Andrew Horan MD;  Location: [facility];  Service: Endoscopy;  Laterality: N/A;    EYE SURGERY      HERNIA REPAIR      left inguinal     HIP REPLACEMENT ARTHROPLASTY Right 11/27/2023    Procedure: ARTHROPLASTY, HIP REPLACEMENT, RIGHT;  Surgeon: Ian Jiménez MD;  Location: [facility];  Service: Orthopedics;  Laterality: Right;  Daniel    TOTAL HIP ARTHROPLASTY Left 11/11/2024       Current Outpatient Medications   Medication Sig    aspirin (ECOTRIN) 81 MG EC tablet Take 1 tablet (81 mg total) by mouth every 12 (twelve) hours.    celecoxib (CELEBREX) 200 MG capsule Take 1 capsule (200 mg total) by mouth 2 (two) times daily.    docusate sodium (COLACE) 100 MG capsule Take 1 capsule (100 mg total) by mouth 2 (two) times daily.    ezetimibe-simvastatin 10-40 mg (VYTORIN) 10-40 mg per tablet Take 1 tablet by mouth every evening.    gabapentin (NEURONTIN) 300 MG capsule Take 1 capsule (300 mg total) by mouth 2 (two) times daily.    metFORMIN (GLUCOPHAGE-XR) 500 MG ER 24hr tablet Take 1 tablet (500 mg total) by mouth daily with breakfast.    ondansetron (ZOFRAN) 4 MG tablet Take 1 tablet (4 mg total) by mouth every 6 (six) hours as needed for Nausea.    ONETOUCH DELICA PLUS LANCET 33 gauge Misc 1 lancet by Misc.(Non-Drug; Combo Route) route 2 (two) times daily.    ONETOUCH VERIO TEST  STRIPS Strp 1 strip by Misc.(Non-Drug; Combo Route) route 2 (two) times daily.    oxyCODONE-acetaminophen (PERCOCET) 7.5-325 mg per tablet Take 1 tablet by mouth every 6 (six) hours as needed for Pain.    traZODone (DESYREL) 50 MG tablet Take 1-2 tablets ( mg total) by mouth nightly as needed for Insomnia.    ONETOUCH VERIO IQ METER Misc 1 kit by Misc.(Non-Drug; Combo Route) route 2 (two) times daily.     No current facility-administered medications for this visit.       Review of patient's allergies indicates:  No Known Allergies    Family History   Problem Relation Name Age of Onset    Alzheimer's disease Mother      Dementia Mother      Cancer Father          prostate     Heart disease Father      No Known Problems Brother         Social History     Socioeconomic History    Marital status:    Occupational History    Occupation: retired [employer]    Tobacco Use    Smoking status: Never    Smokeless tobacco: Never   Substance and Sexual Activity    Alcohol use: Yes     Alcohol/week: 5.0 standard drinks of alcohol     Types: 5 Cans of beer per week     Comment: 4-5 beer per week     Drug use: No    Sexual activity: Yes     Partners: Female     Birth control/protection: None       History of present illness:  79-year-old male, returns to clinic today status post left total hip arthroplasty yesterday.  Here today for routine follow up.  Patient complains of significant pain.     Review of Systems:    Constitution: Negative for chills, fever, and sweats.  Negative for unexplained weight loss.    HENT:  Negative for headaches and blurry vision.    Cardiovascular:Negative for chest pain or irregular heart beat. Negative for hypertension.    Respiratory:  Negative for cough and shortness of breath.    Gastrointestinal: Negative for abdominal pain, heartburn, melena, nausea, and vomitting.    Genitourinary:  Negative bladder incontinence and dysuria.    Musculoskeletal:  See HPI for details.     Neurological:  Negative for numbness.    Psychiatric/Behavioral: Negative for depression.  The patient is not nervous/anxious.      Endocrine: Negative for polyuria    Hematologic/Lymphatic: Negative for bleeding problem.  Does not bruise/bleed easily.    Skin: Negative for poor would healing and rash    Objective:      Physical Examination:    Vital Signs:  There were no vitals filed for this visit.    Body mass index is 33.56 kg/m².    This a well-developed, well nourished patient in no acute distress.  They are alert and oriented and cooperative to examination.        Wounds are clean dry and intact leg lengths are symmetric.  Calf is soft  Pertinent New Results:    XRAY Report / Interpretation:   AP pelvis done today demonstrates his hardware to be in excellent position no change from postop x-rays yesterday.    Assessment/Plan:      Stable following total hip arthroplasty.  I went over all the findings with him in great detail.  Went over all the postop care.  The patient went over his hip precautions in great detail Follow-up in 2 weeks for suture removal      This note was created using Dragon voice recognition software that occasionally misinterpreted phrases or words.

## 2024-11-15 ENCOUNTER — TELEPHONE (OUTPATIENT)
Dept: PRIMARY CARE CLINIC | Facility: CLINIC | Age: 79
End: 2024-11-15
Payer: MEDICARE

## 2024-11-15 RX ORDER — SULFAMETHOXAZOLE AND TRIMETHOPRIM 800; 160 MG/1; MG/1
1 TABLET ORAL 2 TIMES DAILY
Qty: 14 TABLET | Refills: 0 | Status: SHIPPED | OUTPATIENT
Start: 2024-11-15 | End: 2024-11-22

## 2024-11-15 RX ORDER — SULFAMETHOXAZOLE AND TRIMETHOPRIM 800; 160 MG/1; MG/1
1 TABLET ORAL 2 TIMES DAILY
Qty: 14 TABLET | Refills: 0 | Status: SHIPPED | OUTPATIENT
Start: 2024-11-15 | End: 2024-11-15 | Stop reason: SDUPTHER

## 2024-11-15 NOTE — TELEPHONE ENCOUNTER
----- Message from Med Assistant Katarzyna sent at 11/15/2024  3:01 PM CST -----  Contact: [phone]@[email or web address]  Pt is requesting medication for bladder infection , pt had hip surgery Monday the patients urine is dark and has a odder.  ----- Message -----  From: Ryan Pearl  Sent: 11/15/2024   2:56 PM CST  To: Ryanne Meier Staff    Good afternoon patient wife Ms. Bullock  would like to request med called in for a bladder infection. Patient had hip surgery on Monday 11/11. Ms. Bullock says that the patient urein is dark and has a odder. Please send med to [facility] - [place], [place] - [address]   Phone: [phone]      Please call Ms. Bullock to advise [phone]

## 2024-11-15 NOTE — TELEPHONE ENCOUNTER
I have sent in Bactrim for a urinary tract infection.  The patient needs to make sure to hydrate well, however, if his symptoms continue he needs to be seen in clinic.

## 2024-11-18 DIAGNOSIS — E78.00 HYPERCHOLESTEREMIA: Chronic | ICD-10-CM

## 2024-11-18 RX ORDER — DICLOFENAC SODIUM 75 MG/1
75 TABLET, DELAYED RELEASE ORAL 2 TIMES DAILY PRN
Qty: 180 TABLET | Refills: 0 | OUTPATIENT
Start: 2024-11-18

## 2024-11-18 RX ORDER — EZETIMIBE AND SIMVASTATIN 10; 40 MG/1; MG/1
1 TABLET ORAL NIGHTLY
Qty: 90 TABLET | Refills: 2 | Status: SHIPPED | OUTPATIENT
Start: 2024-11-18

## 2024-11-18 NOTE — TELEPHONE ENCOUNTER
No care due was identified.  [facility] Embedded Care Due Messages. Reference number: [id number].   11/18/2024 11:13:34 AM CST

## 2024-11-18 NOTE — TELEPHONE ENCOUNTER
Refill Routing Note   Medication(s) are not appropriate for processing by [facility] for the following reason(s):        Outside of protocol    [facility] action(s):  Route             Appointments  past 12m or future 3m with PCP    Date Provider   Last Visit   10/11/2024 Hardeep Peral MD   Next Visit   4/11/2025 Hardeep Pearl MD   ED visits in past 90 days: 0        Note composed:5:57 PM 11/18/2024

## 2024-11-25 ENCOUNTER — OFFICE VISIT (OUTPATIENT)
Dept: ORTHOPEDICS | Facility: CLINIC | Age: 79
End: 2024-11-25
Payer: MEDICARE

## 2024-11-25 VITALS — WEIGHT: 233.94 LBS | BODY MASS INDEX: 33.49 KG/M2 | HEIGHT: 70 IN

## 2024-11-25 DIAGNOSIS — Z96.642 S/P TOTAL LEFT HIP ARTHROPLASTY: Primary | ICD-10-CM

## 2024-11-25 PROCEDURE — 3288F FALL RISK ASSESSMENT DOCD: CPT | Mod: CPTII,S$GLB,, | Performed by: PHYSICIAN ASSISTANT

## 2024-11-25 PROCEDURE — 99024 POSTOP FOLLOW-UP VISIT: CPT | Mod: S$GLB,,, | Performed by: PHYSICIAN ASSISTANT

## 2024-11-25 PROCEDURE — 1125F AMNT PAIN NOTED PAIN PRSNT: CPT | Mod: CPTII,S$GLB,, | Performed by: PHYSICIAN ASSISTANT

## 2024-11-25 PROCEDURE — 1101F PT FALLS ASSESS-DOCD LE1/YR: CPT | Mod: CPTII,S$GLB,, | Performed by: PHYSICIAN ASSISTANT

## 2024-11-25 PROCEDURE — 99999 PR PBB SHADOW E&M-EST. PATIENT-LVL III: CPT | Mod: PBBFAC,,, | Performed by: PHYSICIAN ASSISTANT

## 2024-11-25 PROCEDURE — 1160F RVW MEDS BY RX/DR IN RCRD: CPT | Mod: CPTII,S$GLB,, | Performed by: PHYSICIAN ASSISTANT

## 2024-11-25 PROCEDURE — 1159F MED LIST DOCD IN RCRD: CPT | Mod: CPTII,S$GLB,, | Performed by: PHYSICIAN ASSISTANT

## 2024-11-25 NOTE — PROGRESS NOTES
[facility]  [address]  [place] [address]  Phone: [phone]   [phone]    Patient's PCP:Hardeep Pearl MD  Referring Provider: No ref. provider found    POST-OP Note:    Subjective:        Chief Complaint:   Chief Complaint   Patient presents with    Left Hip - Post-op Evaluation     Sutures// PO Left INDY 11/11/24, states his pain is better than his last visit         Past Medical History:   Diagnosis Date    Cancer 01/01/1992    colon    Cancer of bladder 2005    Hyperlipidemia     Postoperative urinary complication     trouble urinating after anesthesia    Pre-diabetes        Past Surgical History:   Procedure Laterality Date    BLADDER SURGERY      CATARACT EXTRACTION Bilateral 02/2023    COLONOSCOPY      COLONOSCOPY N/A 11/24/2020    Procedure: COLONOSCOPY;  Surgeon: Andrew Horan MD;  Location: [facility];  Service: Endoscopy;  Laterality: N/A;    EYE SURGERY      HERNIA REPAIR      left inguinal     HIP REPLACEMENT ARTHROPLASTY Right 11/27/2023    Procedure: ARTHROPLASTY, HIP REPLACEMENT, RIGHT;  Surgeon: Ian Jiménez MD;  Location: [facility];  Service: Orthopedics;  Laterality: Right;  Daniel    HIP REPLACEMENT ARTHROPLASTY Left 11/11/2024    Procedure: ARTHROPLASTY, HIP REPLACEMENT;  Surgeon: Ian Jiménez MD;  Location: [facility];  Service: Orthopedics;  Laterality: Left;  Daniel    TOTAL HIP ARTHROPLASTY Left 11/11/2024       Current Outpatient Medications   Medication Sig    aspirin (ECOTRIN) 81 MG EC tablet Take 1 tablet (81 mg total) by mouth every 12 (twelve) hours.    celecoxib (CELEBREX) 200 MG capsule Take 1 capsule (200 mg total) by mouth 2 (two) times daily.    docusate sodium (COLACE) 100 MG capsule Take 1 capsule (100 mg total) by mouth 2 (two) times daily.    ezetimibe-simvastatin 10-40 mg (VYTORIN) 10-40 mg per tablet TAKE 1 TABLET EVERY EVENING    gabapentin (NEURONTIN) 300 MG capsule Take 1 capsule (300 mg total) by mouth 2 (two) times daily.     HYDROmorphone (DILAUDID) 4 MG tablet Take 1 tablet (4 mg total) by mouth every 6 (six) hours as needed for Pain.    metFORMIN (GLUCOPHAGE-XR) 500 MG ER 24hr tablet Take 1 tablet (500 mg total) by mouth daily with breakfast.    ondansetron (ZOFRAN) 4 MG tablet Take 1 tablet (4 mg total) by mouth every 6 (six) hours as needed for Nausea.    ONETOUCH DELICA PLUS LANCET 33 gauge Misc 1 lancet by Misc.(Non-Drug; Combo Route) route 2 (two) times daily.    ONETOUCH VERIO IQ METER Misc 1 kit by Misc.(Non-Drug; Combo Route) route 2 (two) times daily.    ONETOUCH VERIO TEST STRIPS Strp 1 strip by Misc.(Non-Drug; Combo Route) route 2 (two) times daily.    oxyCODONE-acetaminophen (PERCOCET) 7.5-325 mg per tablet Take 1 tablet by mouth every 6 (six) hours as needed for Pain.    traZODone (DESYREL) 50 MG tablet Take 1-2 tablets ( mg total) by mouth nightly as needed for Insomnia.     No current facility-administered medications for this visit.       Review of patient's allergies indicates:  No Known Allergies    Family History   Problem Relation Name Age of Onset    Alzheimer's disease Mother      Dementia Mother      Cancer Father          prostate     Heart disease Father      No Known Problems Brother         Social History     Socioeconomic History    Marital status:    Occupational History    Occupation: retired refinery    Tobacco Use    Smoking status: Never    Smokeless tobacco: Never   Substance and Sexual Activity    Alcohol use: Yes     Alcohol/week: 5.0 standard drinks of alcohol     Types: 5 Cans of beer per week     Comment: 4-5 beer per week     Drug use: No    Sexual activity: Yes     Partners: Female     Birth control/protection: None       History of present illness: 79 y.o. male returns to clinic today status post Left hip arthroplasty.  Patient is here today for routine follow-up.    Review of Systems:    Musculoskeletal:  See HPI       Objective:        Physical Examination:    Vital Signs: There  were no vitals filed for this visit.    Body mass index is 33.56 kg/m².    This a well-developed, well nourished patient in no acute distress.  They are alert and oriented and cooperative to examination.        Examination of the Left hip, with surgical incision consistent with history of stated procedure. The skin is dry and intact, no erythema or ecchymosis, no signs symptoms of infection.  No evidence of wound failure dehiscence.  The remainder of the patient's surgical dressings and sutures were removed today without complication. Calf soft nontender, straight leg raise is negative.  There are 2+ distal pulses and intact light touch sensation.        Pertinent New Results:       XRAY Report / Interpretation:          Assessment:       1. S/P total left hip arthroplasty      Plan:     S/P total left hip arthroplasty        Follow up for Tues/Thurs with Dr. Jiménez, PO, XR, 1 month f/u - left NIDY 11/11/24.      Stable status post hip arthroplasty.  We went over wound care and medications in great detail as well as hip precautions.  We are going to transition from home health formal physical therapy.  Continue to work with strengthening and range motion.  Follow up in 1 month.    Wound Care review: on approximately day 3 after surgery, or 72 hours after your initial surgery date, you may begin cleaning your wounds (AS LONG AS THERE IS NO DRAINAGE PRESENT).     Gentle cleansing with a mild soap and water is recommended. Pat the area dry, and then cover the wound with a clean, sterile dressing.  Do this at least once daily.  Do not apply any ointments creams or lotions to the wounds until told to do so.  Avoid submerging or immersing the wounds in water such as a sink, tub, or pool for at least 1 month after surgery.    Hip precautions include:   1. avoid bending at the waist or hip greater than or past 90°,   2. twisting your leg in or out,   3. crossing your legs.     Sit with your hips higher than your knees, sit  in a chair with armrests, and sleep on your back with a pillow between your legs. These should be followed for 3 months after surgery.    Medication review, at the time of your surgery you were prescribed a combination of various medications which may have included up to six prescriptions:      1. First prescription was for an anticoagulant.  Most commonly aspirin 81 mg to be taken every 12 hours for 30 days postoperatively for DVT prophylaxis.  If you took aspirin prior to the start of this medication you can resume your normal aspirin dosing per your prescribing provider after 30 days.    If you were already on an anticoagulant then continue this medication as directed daily.    2. Second prescription was for a narcotic pain medication (Percocet 7.5 mg or similar) with instructions to take 1 tablet by mouth every 6 hours as needed for pain. Over the next 3 months, we will continue to taper your medications decreasing both the frequency and strength of the medications over time.    3. Third prescription was for an anti-inflammatory, Celebrex 200 mg with instructions to take 1 tab by mouth as needed every 12 hours for 1 month postoperatively. If you are on another anticoagulant other than aspirin you were not prescribed this medication.    4. Fourth prescription is for Neurontin 300 mg with instructions to take 1 tablet by mouth as needed every 12 hours for 1 month postoperatively.    5. Fifth prescription was for Colace 100 mg with instructions to take 1 tablet by mouth as needed every 12 hours for constipation associated with narcotic use.    6.  A Sixth and final prescription may be an antiemetic such as Zofran 4 mg with instructions to take 1 tablet by mouth as needed for postop nausea if you suffer from this.           CHE Fitzpatrick, PA-C      EMR Statement:  Please note that portions of this patient encounter record were imported from the patients electronic medical record and that others were dictated  using voice recognition software. For these reasons grammatical errors, nonsensical language, and apparently contradictory statements may be present.  These should be disregarded or interpreted with respect to the context of the document.

## 2024-11-25 NOTE — PROGRESS NOTES
[facility]  [address]  [place] [address]  Phone: [phone]   [phone]    Patient's PCP:Hardeep Pearl MD  Referring Provider: No ref. provider found    POST-OP Note:    Subjective:        Chief Complaint:   Chief Complaint   Patient presents with    Left Hip - Post-op Evaluation     Sutures// PO Left INDY 11/11/24, states his pain is better than his last visit         Past Medical History:   Diagnosis Date    Cancer 01/01/1992    colon    Cancer of bladder 2005    Hyperlipidemia     Postoperative urinary complication     trouble urinating after anesthesia    Pre-diabetes        Past Surgical History:   Procedure Laterality Date    BLADDER SURGERY      CATARACT EXTRACTION Bilateral 02/2023    COLONOSCOPY      COLONOSCOPY N/A 11/24/2020    Procedure: COLONOSCOPY;  Surgeon: Andrew Horan MD;  Location: [facility];  Service: Endoscopy;  Laterality: N/A;    EYE SURGERY      HERNIA REPAIR      left inguinal     HIP REPLACEMENT ARTHROPLASTY Right 11/27/2023    Procedure: ARTHROPLASTY, HIP REPLACEMENT, RIGHT;  Surgeon: Ian Jiménez MD;  Location: [facility] OR;  Service: Orthopedics;  Laterality: Right;  Daniel    HIP REPLACEMENT ARTHROPLASTY Left 11/11/2024    Procedure: ARTHROPLASTY, HIP REPLACEMENT;  Surgeon: Ian Jiménez MD;  Location: [facility] OR;  Service: Orthopedics;  Laterality: Left;  Daniel    TOTAL HIP ARTHROPLASTY Left 11/11/2024       Current Outpatient Medications   Medication Sig    aspirin (ECOTRIN) 81 MG EC tablet Take 1 tablet (81 mg total) by mouth every 12 (twelve) hours.    celecoxib (CELEBREX) 200 MG capsule Take 1 capsule (200 mg total) by mouth 2 (two) times daily.    docusate sodium (COLACE) 100 MG capsule Take 1 capsule (100 mg total) by mouth 2 (two) times daily.    ezetimibe-simvastatin 10-40 mg (VYTORIN) 10-40 mg per tablet TAKE 1 TABLET EVERY EVENING    gabapentin (NEURONTIN) 300 MG capsule Take 1 capsule (300 mg total) by mouth 2 (two) times daily.     HYDROmorphone (DILAUDID) 4 MG tablet Take 1 tablet (4 mg total) by mouth every 6 (six) hours as needed for Pain.    metFORMIN (GLUCOPHAGE-XR) 500 MG ER 24hr tablet Take 1 tablet (500 mg total) by mouth daily with breakfast.    ondansetron (ZOFRAN) 4 MG tablet Take 1 tablet (4 mg total) by mouth every 6 (six) hours as needed for Nausea.    ONETOUCH DELICA PLUS LANCET 33 gauge Misc 1 lancet by Misc.(Non-Drug; Combo Route) route 2 (two) times daily.    ONETOUCH VERIO IQ METER Misc 1 kit by Misc.(Non-Drug; Combo Route) route 2 (two) times daily.    ONETOUCH VERIO TEST STRIPS Strp 1 strip by Misc.(Non-Drug; Combo Route) route 2 (two) times daily.    oxyCODONE-acetaminophen (PERCOCET) 7.5-325 mg per tablet Take 1 tablet by mouth every 6 (six) hours as needed for Pain.    traZODone (DESYREL) 50 MG tablet Take 1-2 tablets ( mg total) by mouth nightly as needed for Insomnia.     No current facility-administered medications for this visit.       Review of patient's allergies indicates:  No Known Allergies    Family History   Problem Relation Name Age of Onset    Alzheimer's disease Mother      Dementia Mother      Cancer Father          prostate     Heart disease Father      No Known Problems Brother         Social History     Socioeconomic History    Marital status:    Occupational History    Occupation: retired refinery    Tobacco Use    Smoking status: Never    Smokeless tobacco: Never   Substance and Sexual Activity    Alcohol use: Yes     Alcohol/week: 5.0 standard drinks of alcohol     Types: 5 Cans of beer per week     Comment: 4-5 beer per week     Drug use: No    Sexual activity: Yes     Partners: Female     Birth control/protection: None       History of present illness: 79 y.o. male returns to clinic today status post Left hip arthroplasty.  Patient is here today for routine follow-up.    Review of Systems:    Musculoskeletal:  See HPI       Objective:        Physical Examination:    Vital Signs: There  were no vitals filed for this visit.    Body mass index is 33.56 kg/m².    This a well-developed, well nourished patient in no acute distress.  They are alert and oriented and cooperative to examination.        Examination of the Left hip, with surgical incision consistent with history of stated procedure. The skin is dry and intact, no erythema or ecchymosis, no signs symptoms of infection.  No evidence of wound failure dehiscence.  The remainder of the patient's surgical dressings and sutures were removed today without complication. Calf soft nontender, straight leg raise is negative.  There are 2+ distal pulses and intact light touch sensation.        Pertinent New Results:       XRAY Report / Interpretation:          Assessment:       1. S/P total left hip arthroplasty      Plan:     S/P total left hip arthroplasty  -     Ambulatory referral/consult to Physical/Occupational Therapy; Future; Expected date: 12/02/2024        Follow up for Tues/Thurs with Dr. Jiménez, PO, XR, 1 month f/u - left INDY 11/11/24.      Stable status post hip arthroplasty.  We went over wound care and medications in great detail as well as hip precautions.  We are going to transition from home health formal physical therapy.  Continue to work with strengthening and range motion.  Follow up in 1 month.    Wound Care review: on approximately day 3 after surgery, or 72 hours after your initial surgery date, you may begin cleaning your wounds (AS LONG AS THERE IS NO DRAINAGE PRESENT).     Gentle cleansing with a mild soap and water is recommended. Pat the area dry, and then cover the wound with a clean, sterile dressing.  Do this at least once daily.  Do not apply any ointments creams or lotions to the wounds until told to do so.  Avoid submerging or immersing the wounds in water such as a sink, tub, or pool for at least 1 month after surgery.    Hip precautions include:   1. avoid bending at the waist or hip greater than or past 90°,   2.  twisting your leg in or out,   3. crossing your legs.     Sit with your hips higher than your knees, sit in a chair with armrests, and sleep on your back with a pillow between your legs. These should be followed for 3 months after surgery.    Medication review, at the time of your surgery you were prescribed a combination of various medications which may have included up to six prescriptions:      1. First prescription was for an anticoagulant.  Most commonly aspirin 81 mg to be taken every 12 hours for 30 days postoperatively for DVT prophylaxis.  If you took aspirin prior to the start of this medication you can resume your normal aspirin dosing per your prescribing provider after 30 days.    If you were already on an anticoagulant then continue this medication as directed daily.    2. Second prescription was for a narcotic pain medication (Percocet 7.5 mg or similar) with instructions to take 1 tablet by mouth every 6 hours as needed for pain. Over the next 3 months, we will continue to taper your medications decreasing both the frequency and strength of the medications over time.    3. Third prescription was for an anti-inflammatory, Celebrex 200 mg with instructions to take 1 tab by mouth as needed every 12 hours for 1 month postoperatively. If you are on another anticoagulant other than aspirin you were not prescribed this medication.    4. Fourth prescription is for Neurontin 300 mg with instructions to take 1 tablet by mouth as needed every 12 hours for 1 month postoperatively.    5. Fifth prescription was for Colace 100 mg with instructions to take 1 tablet by mouth as needed every 12 hours for constipation associated with narcotic use.    6.  A Sixth and final prescription may be an antiemetic such as Zofran 4 mg with instructions to take 1 tablet by mouth as needed for postop nausea if you suffer from this.           CHE Fitzpatrick, PA-C      EMR Statement:  Please note that portions of this patient  encounter record were imported from the patients electronic medical record and that others were dictated using voice recognition software. For these reasons grammatical errors, nonsensical language, and apparently contradictory statements may be present.  These should be disregarded or interpreted with respect to the context of the document.

## 2024-12-19 ENCOUNTER — HOSPITAL ENCOUNTER (OUTPATIENT)
Dept: RADIOLOGY | Facility: HOSPITAL | Age: 79
Discharge: HOME | End: 2024-12-19
Attending: ORTHOPAEDIC SURGERY
Payer: MEDICARE

## 2024-12-19 ENCOUNTER — OFFICE VISIT (OUTPATIENT)
Dept: ORTHOPEDICS | Facility: CLINIC | Age: 79
End: 2024-12-19
Payer: MEDICARE

## 2024-12-19 VITALS — BODY MASS INDEX: 33.49 KG/M2 | HEIGHT: 70 IN | WEIGHT: 233.94 LBS

## 2024-12-19 DIAGNOSIS — Z96.642 S/P TOTAL LEFT HIP ARTHROPLASTY: Primary | ICD-10-CM

## 2024-12-19 PROCEDURE — 72170 X-RAY EXAM OF PELVIS: CPT | Mod: 26,,, | Performed by: RADIOLOGY

## 2024-12-19 PROCEDURE — 1125F AMNT PAIN NOTED PAIN PRSNT: CPT | Mod: CPTII,S$GLB,, | Performed by: ORTHOPAEDIC SURGERY

## 2024-12-19 PROCEDURE — 1159F MED LIST DOCD IN RCRD: CPT | Mod: CPTII,S$GLB,, | Performed by: ORTHOPAEDIC SURGERY

## 2024-12-19 PROCEDURE — 1160F RVW MEDS BY RX/DR IN RCRD: CPT | Mod: CPTII,S$GLB,, | Performed by: ORTHOPAEDIC SURGERY

## 2024-12-19 PROCEDURE — 72170 X-RAY EXAM OF PELVIS: CPT | Mod: TC,PN

## 2024-12-19 PROCEDURE — 99024 POSTOP FOLLOW-UP VISIT: CPT | Mod: S$GLB,,, | Performed by: ORTHOPAEDIC SURGERY

## 2024-12-19 PROCEDURE — 99999 PR PBB SHADOW E&M-EST. PATIENT-LVL III: CPT | Mod: PBBFAC,,, | Performed by: ORTHOPAEDIC SURGERY

## 2024-12-19 NOTE — PROGRESS NOTES
[facility] ORTHOPEDICS POST-OP NOTE    Subjective:           Chief Complaint:   Chief Complaint   Patient presents with    Left Hip - Pain, Post-op Evaluation      Follow up for PO Left INDY 11/11/24, states her pain is better than his last visit        Past Medical History:   Diagnosis Date    Cancer 01/01/1992    colon    Cancer of bladder 2005    Hyperlipidemia     Postoperative urinary complication     trouble urinating after anesthesia    Pre-diabetes        Past Surgical History:   Procedure Laterality Date    BLADDER SURGERY      CATARACT EXTRACTION Bilateral 02/2023    COLONOSCOPY      COLONOSCOPY N/A 11/24/2020    Procedure: COLONOSCOPY;  Surgeon: Andrew Horan MD;  Location: [facility];  Service: Endoscopy;  Laterality: N/A;    EYE SURGERY      HERNIA REPAIR      left inguinal     HIP REPLACEMENT ARTHROPLASTY Right 11/27/2023    Procedure: ARTHROPLASTY, HIP REPLACEMENT, RIGHT;  Surgeon: Ian Jiménez MD;  Location: [facility] OR;  Service: Orthopedics;  Laterality: Right;  Daniel    HIP REPLACEMENT ARTHROPLASTY Left 11/11/2024    Procedure: ARTHROPLASTY, HIP REPLACEMENT;  Surgeon: Ian Jiménez MD;  Location: [facility] OR;  Service: Orthopedics;  Laterality: Left;  Daniel    TOTAL HIP ARTHROPLASTY Left 11/11/2024       Current Outpatient Medications   Medication Sig    ezetimibe-simvastatin 10-40 mg (VYTORIN) 10-40 mg per tablet TAKE 1 TABLET EVERY EVENING    metFORMIN (GLUCOPHAGE-XR) 500 MG ER 24hr tablet Take 1 tablet (500 mg total) by mouth daily with breakfast.    ONETOUCH DELICA PLUS LANCET 33 gauge Misc 1 lancet by Misc.(Non-Drug; Combo Route) route 2 (two) times daily.    ONETOUCH VERIO IQ METER Misc 1 kit by Misc.(Non-Drug; Combo Route) route 2 (two) times daily.    ONETOUCH VERIO TEST STRIPS Strp 1 strip by Misc.(Non-Drug; Combo Route) route 2 (two) times daily.    traZODone (DESYREL) 50 MG tablet Take 1-2 tablets ( mg total) by mouth nightly as needed for Insomnia.     No current facility-administered  medications for this visit.       Review of patient's allergies indicates:  No Known Allergies    Family History   Problem Relation Name Age of Onset    Alzheimer's disease Mother      Dementia Mother      Cancer Father          prostate     Heart disease Father      No Known Problems Brother         Social History     Socioeconomic History    Marital status:    Occupational History    Occupation: retired refinery    Tobacco Use    Smoking status: Never    Smokeless tobacco: Never   Substance and Sexual Activity    Alcohol use: Yes     Alcohol/week: 5.0 standard drinks of alcohol     Types: 5 Cans of beer per week     Comment: 4-5 beer per week     Drug use: No    Sexual activity: Yes     Partners: Female     Birth control/protection: None       History of present illness:  Mr. Nayak comes in today for follow-up for his left total hip arthroplasty.  He is 6 weeks postop doing fairly well.  He has finished formal physical therapy.  He does complain of some left anterior thigh pain.  He had the same symptoms postop on his right total hip arthroplasty about a year ago that did resolve.    Review of Systems:    Musculoskeletal:  See HPI      Objective:        Physical Examination:    Vital Signs:  There were no vitals filed for this visit.    Body mass index is 33.56 kg/m².    This a well-developed, well nourished patient in no acute distress.  They are alert and oriented and cooperative to examination.        Left hip exam: Skin to left hip clean dry and intact.  No erythema or ecchymosis.  No signs or symptoms of infection.  Neurovascularly intact throughout the left lower extremity.  Left lateral hip incision well healed without wound dehiscence or drainage.  Negative Homans on the left.  He can weightbear as tolerated left lower extremity.  Nonantalgic gait.    Pertinent New Results:    XRAY Report / Interpretation:   See attached report    Assessment/Plan:      1. Status post left total hip arthroplasty.   "    Patient doing well at this stage postoperatively.  Reassurance was provided to him today that his thigh pain should resolve with a tincture of time.  He can continue/resume his regular activities of daily living as pain tolerates.  Did discuss with him total hip replacement precautions that are still in effect for least 6 more weeks.  We will check him back again in 2 months to assess his progress.    Paddy Regan, Physician Assistant, served in the capacity as a "scribe" for this patient encounter.  A "face-to-face" encounter occurred with Dr. Ian Jiménez on this date.  The treatment plan and medical decision-making is outlined above. Patient was seen and examined with a chaperone.     This note was created using Dragon voice recognition software that occasionally misinterpreted phrases or words.        "

## 2024-12-20 ENCOUNTER — EXTERNAL HOME HEALTH (OUTPATIENT)
Dept: HOME HEALTH SERVICES | Facility: HOSPITAL | Age: 79
End: 2024-12-20
Payer: MEDICARE

## 2025-01-30 DIAGNOSIS — Z00.00 ENCOUNTER FOR MEDICARE ANNUAL WELLNESS EXAM: ICD-10-CM

## 2025-02-20 ENCOUNTER — HOSPITAL ENCOUNTER (OUTPATIENT)
Dept: RADIOLOGY | Facility: HOSPITAL | Age: 80
Discharge: HOME | End: 2025-02-20
Attending: ORTHOPAEDIC SURGERY
Payer: MEDICARE

## 2025-02-20 ENCOUNTER — OFFICE VISIT (OUTPATIENT)
Dept: ORTHOPEDICS | Facility: CLINIC | Age: 80
End: 2025-02-20
Payer: MEDICARE

## 2025-02-20 VITALS — HEIGHT: 70 IN | BODY MASS INDEX: 33.64 KG/M2 | WEIGHT: 235 LBS

## 2025-02-20 DIAGNOSIS — Z96.642 HISTORY OF HIP REPLACEMENT, TOTAL, LEFT: Primary | ICD-10-CM

## 2025-02-20 PROCEDURE — 72170 X-RAY EXAM OF PELVIS: CPT | Mod: TC,PN

## 2025-02-20 PROCEDURE — 99999 PR PBB SHADOW E&M-EST. PATIENT-LVL III: CPT | Mod: PBBFAC,,, | Performed by: ORTHOPAEDIC SURGERY

## 2025-02-20 NOTE — PROGRESS NOTES
[facility]    Subjective:     Chief Complaint:   Chief Complaint   Patient presents with    Left Hip - Pain     Follow up for Left INDY 11/11/24, doing better, pain is off and on,        Past Medical History:   Diagnosis Date    Cancer 01/01/1992    colon    Cancer of bladder 2005    Hyperlipidemia     Postoperative urinary complication     trouble urinating after anesthesia    Pre-diabetes        Past Surgical History:   Procedure Laterality Date    BLADDER SURGERY      CATARACT EXTRACTION Bilateral 02/2023    COLONOSCOPY      COLONOSCOPY N/A 11/24/2020    Procedure: COLONOSCOPY;  Surgeon: Andrew Horan MD;  Location: [facility];  Service: Endoscopy;  Laterality: N/A;    EYE SURGERY      HERNIA REPAIR      left inguinal     HIP REPLACEMENT ARTHROPLASTY Right 11/27/2023    Procedure: ARTHROPLASTY, HIP REPLACEMENT, RIGHT;  Surgeon: Ian Jiménez MD;  Location: [facility];  Service: Orthopedics;  Laterality: Right;  Daniel    HIP REPLACEMENT ARTHROPLASTY Left 11/11/2024    Procedure: ARTHROPLASTY, HIP REPLACEMENT;  Surgeon: Ian Jiménez MD;  Location: [facility] OR;  Service: Orthopedics;  Laterality: Left;  Daniel    TOTAL HIP ARTHROPLASTY Left 11/11/2024       Current Outpatient Medications   Medication Sig    ezetimibe-simvastatin 10-40 mg (VYTORIN) 10-40 mg per tablet TAKE 1 TABLET EVERY EVENING    metFORMIN (GLUCOPHAGE-XR) 500 MG ER 24hr tablet Take 1 tablet (500 mg total) by mouth daily with breakfast.    ONETOUCH DELICA PLUS LANCET 33 gauge Misc 1 lancet by Misc.(Non-Drug; Combo Route) route 2 (two) times daily.    ONETOUCH VERIO IQ METER Misc 1 kit by Misc.(Non-Drug; Combo Route) route 2 (two) times daily.    ONETOUCH VERIO TEST STRIPS Strp 1 strip by Misc.(Non-Drug; Combo Route) route 2 (two) times daily.    traZODone (DESYREL) 50 MG tablet Take 1-2 tablets ( mg total) by mouth nightly as needed for Insomnia.     No current facility-administered medications for this visit.       Review of patient's  allergies indicates:  No Known Allergies    Family History   Problem Relation Name Age of Onset    Alzheimer's disease Mother      Dementia Mother      Cancer Father          prostate     Heart disease Father      No Known Problems Brother         Social History[1]    History of present illness:  79-year-old male, returns to clinic today status post left total hip arthroplasty November 2024, we did his right hip November 2023.  Now greater than 3 months postop.  He is here today for routine follow up.      Review of Systems:    Constitution: Negative for chills, fever, and sweats.  Negative for unexplained weight loss.    HENT:  Negative for headaches and blurry vision.    Cardiovascular:Negative for chest pain or irregular heart beat. Negative for hypertension.    Respiratory:  Negative for cough and shortness of breath.    Gastrointestinal: Negative for abdominal pain, heartburn, melena, nausea, and vomitting.    Genitourinary:  Negative bladder incontinence and dysuria.    Musculoskeletal:  See HPI for details.     Neurological: Negative for numbness.    Psychiatric/Behavioral: Negative for depression.  The patient is not nervous/anxious.      Endocrine: Negative for polyuria    Hematologic/Lymphatic: Negative for bleeding problem.  Does not bruise/bleed easily.    Skin: Negative for poor would healing and rash    Objective:      Physical Examination:    Vital Signs:  There were no vitals filed for this visit.    Body mass index is 33.72 kg/m².    This a well-developed, well nourished patient in no acute distress.  They are alert and oriented and cooperative to examination.        Examination of the left hip, gentle range motion of the left hip without significant pain or discomfort.  Normal range of motion in the left knee.  He weight bears as tolerated.  He is not using assistive device.  Nonantalgic gait.  Pertinent New Results:    XRAY Report / Interpretation:   AP pelvis demonstrates bilateral total hip  "arthroplasties without evidence of hardware failure or loosening or subsidence.    Assessment/Plan:      Three months history left total hip arthroplasty.  Right hips over a year old now.  Still having a little bit of occasional discomfort, he feels some snapping in the hip.  X-rays look okay.  Physical exam is normal.  He has completed physical therapy.  Subjectively the symptoms are improving.  I am concerned that his cup maybe a little anteverted but this should get better on its own.  We are going to check him back in 6 weeks.  Activity as tolerated.    Damien Garcia, Physician Assistant, served in the capacity as a "scribe" for this patient encounter.  A "face-to-face" encounter occurred with Dr. Ian Jiménez on this date.  The treatment plan and medical decision-making is outlined above. Patient was seen and examined with a chaperone.       This note was created using Dragon voice recognition software that occasionally misinterpreted phrases or words.               [1]   Social History  Socioeconomic History    Marital status:    Occupational History    Occupation: retired refinery    Tobacco Use    Smoking status: Never    Smokeless tobacco: Never   Substance and Sexual Activity    Alcohol use: Yes     Alcohol/week: 5.0 standard drinks of alcohol     Types: 5 Cans of beer per week     Comment: 4-5 beer per week     Drug use: No    Sexual activity: Yes     Partners: Female     Birth control/protection: None     "

## 2025-04-03 ENCOUNTER — OFFICE VISIT (OUTPATIENT)
Dept: ORTHOPEDICS | Facility: CLINIC | Age: 80
End: 2025-04-03
Payer: MEDICARE

## 2025-04-03 VITALS
BODY MASS INDEX: 33.64 KG/M2 | SYSTOLIC BLOOD PRESSURE: 150 MMHG | WEIGHT: 235 LBS | HEIGHT: 70 IN | DIASTOLIC BLOOD PRESSURE: 68 MMHG

## 2025-04-03 DIAGNOSIS — Z96.642 HISTORY OF HIP REPLACEMENT, TOTAL, LEFT: Primary | ICD-10-CM

## 2025-04-03 PROCEDURE — 99999 PR PBB SHADOW E&M-EST. PATIENT-LVL III: CPT | Mod: PBBFAC,,, | Performed by: ORTHOPAEDIC SURGERY

## 2025-04-03 PROCEDURE — 99213 OFFICE O/P EST LOW 20 MIN: CPT | Mod: S$GLB,,, | Performed by: ORTHOPAEDIC SURGERY

## 2025-04-03 PROCEDURE — 3078F DIAST BP <80 MM HG: CPT | Mod: CPTII,S$GLB,, | Performed by: ORTHOPAEDIC SURGERY

## 2025-04-03 PROCEDURE — 1101F PT FALLS ASSESS-DOCD LE1/YR: CPT | Mod: CPTII,S$GLB,, | Performed by: ORTHOPAEDIC SURGERY

## 2025-04-03 PROCEDURE — 1159F MED LIST DOCD IN RCRD: CPT | Mod: CPTII,S$GLB,, | Performed by: ORTHOPAEDIC SURGERY

## 2025-04-03 PROCEDURE — 1160F RVW MEDS BY RX/DR IN RCRD: CPT | Mod: CPTII,S$GLB,, | Performed by: ORTHOPAEDIC SURGERY

## 2025-04-03 PROCEDURE — 3288F FALL RISK ASSESSMENT DOCD: CPT | Mod: CPTII,S$GLB,, | Performed by: ORTHOPAEDIC SURGERY

## 2025-04-03 PROCEDURE — 3077F SYST BP >= 140 MM HG: CPT | Mod: CPTII,S$GLB,, | Performed by: ORTHOPAEDIC SURGERY

## 2025-04-03 PROCEDURE — 1126F AMNT PAIN NOTED NONE PRSNT: CPT | Mod: CPTII,S$GLB,, | Performed by: ORTHOPAEDIC SURGERY

## 2025-04-03 NOTE — PROGRESS NOTES
[facility]    Subjective:     Chief Complaint:   Chief Complaint   Patient presents with    Left Hip - Pain     Follow up for left INDY 11/11/24 doing good no new concerns; no more constant sharp pain in the left but does get quick shooting pain in both here and there        Past Medical History:   Diagnosis Date    Cancer 01/01/1992    colon    Cancer of bladder 2005    Hyperlipidemia     Postoperative urinary complication     trouble urinating after anesthesia    Pre-diabetes        Past Surgical History:   Procedure Laterality Date    BLADDER SURGERY      CATARACT EXTRACTION Bilateral 02/2023    COLONOSCOPY      COLONOSCOPY N/A 11/24/2020    Procedure: COLONOSCOPY;  Surgeon: Andrew Horan MD;  Location: [facility] ENDO;  Service: Endoscopy;  Laterality: N/A;    EYE SURGERY      HERNIA REPAIR      left inguinal     HIP REPLACEMENT ARTHROPLASTY Right 11/27/2023    Procedure: ARTHROPLASTY, HIP REPLACEMENT, RIGHT;  Surgeon: Ian Jiménez MD;  Location: [facility] OR;  Service: Orthopedics;  Laterality: Right;  Daniel    HIP REPLACEMENT ARTHROPLASTY Left 11/11/2024    Procedure: ARTHROPLASTY, HIP REPLACEMENT;  Surgeon: Ian Jiménez MD;  Location: [facility] OR;  Service: Orthopedics;  Laterality: Left;  Daniel    TOTAL HIP ARTHROPLASTY Left 11/11/2024       Current Outpatient Medications   Medication Sig    ezetimibe-simvastatin 10-40 mg (VYTORIN) 10-40 mg per tablet TAKE 1 TABLET EVERY EVENING    metFORMIN (GLUCOPHAGE-XR) 500 MG ER 24hr tablet Take 1 tablet (500 mg total) by mouth daily with breakfast.    ONETOUCH DELICA PLUS LANCET 33 gauge Misc 1 lancet by Misc.(Non-Drug; Combo Route) route 2 (two) times daily.    ONETOUCH VERIO IQ METER Misc 1 kit by Misc.(Non-Drug; Combo Route) route 2 (two) times daily.    ONETOUCH VERIO TEST STRIPS Strp 1 strip by Misc.(Non-Drug; Combo Route) route 2 (two) times daily.    traZODone (DESYREL) 50 MG tablet Take 1-2 tablets ( mg total) by mouth nightly as needed for Insomnia.      No current facility-administered medications for this visit.       Review of patient's allergies indicates:  No Known Allergies    Family History   Problem Relation Name Age of Onset    Alzheimer's disease Mother      Dementia Mother      Cancer Father          prostate     Heart disease Father      No Known Problems Brother         Social History[1]    History of present illness:  Mr. Nayak comes in today for follow-up for his bilateral total hip arthroplasties.  He is 5 months status post left total hip arthroplasty.  He is doing well.  He is pleased with his postoperative result.  His pain is much better now than where he was preoperatively.    Review of Systems:    Constitution: Negative for chills, fever, and sweats.  Negative for unexplained weight loss.    HENT:  Negative for headaches and blurry vision.    Cardiovascular:Negative for chest pain or irregular heart beat. Negative for hypertension.    Respiratory:  Negative for cough and shortness of breath.    Gastrointestinal: Negative for abdominal pain, heartburn, melena, nausea, and vomitting.    Genitourinary:  Negative bladder incontinence and dysuria.    Musculoskeletal:  See HPI for details.     Neurological: Negative for numbness.    Psychiatric/Behavioral: Negative for depression.  The patient is not nervous/anxious.      Endocrine: Negative for polyuria    Hematologic/Lymphatic: Negative for bleeding problem.  Does not bruise/bleed easily.    Skin: Negative for poor would healing and rash    Objective:      Physical Examination:    Vital Signs:    Vitals:    04/03/25 0950   BP: (!) 150/68       Body mass index is 33.72 kg/m².    This a well-developed, well nourished patient in no acute distress.  They are alert and oriented and cooperative to examination.        Bilateral hip exam: Skin to bilateral hips clean dry and intact.  No erythema or ecchymosis bilaterally.  No signs or symptoms of infection bilaterally.  Bilateral lateral hip incisions  "well healed without wound dehiscence or drainage.  Negative Homans bilaterally.  He is neurovascularly intact throughout bilateral lower extremities.  He can weightbear as tolerated on bilateral lower extremities.  Nonantalgic gait.  He ambulates without an aid.  Well-preserved internal/external rotation of bilateral hips without pain.      Pertinent New Results:    XRAY Report / Interpretation:   No new radiographs taken on today's clinic visit.    Assessment/Plan:      1. Status post bilateral total hip arthroplasty.      Patient doing very well postoperatively.  He is pleased with his results.  His pain is better now than where he was preoperatively.  He can resume/continue his regular activities of daily living without restriction.  Follow up with us on an as-needed basis for any issues or concerns that arise.    Paddy Regan, Physician Assistant, served in the capacity as a "scribe" for this patient encounter.  A "face-to-face" encounter occurred with Dr. Ian Jiménez on this date.  The treatment plan and medical decision-making is outlined above. Patient was seen and examined with a chaperone.       This note was created using Dragon voice recognition software that occasionally misinterpreted phrases or words.               [1]   Social History  Socioeconomic History    Marital status:    Occupational History    Occupation: retired refinery    Tobacco Use    Smoking status: Never    Smokeless tobacco: Never   Substance and Sexual Activity    Alcohol use: Yes     Alcohol/week: 5.0 standard drinks of alcohol     Types: 5 Cans of beer per week     Comment: 4-5 beer per week     Drug use: No    Sexual activity: Yes     Partners: Female     Birth control/protection: None     "

## 2025-04-06 ENCOUNTER — RESULTS FOLLOW-UP (OUTPATIENT)
Dept: PRIMARY CARE CLINIC | Facility: CLINIC | Age: 80
End: 2025-04-06

## 2025-04-11 ENCOUNTER — OFFICE VISIT (OUTPATIENT)
Dept: PRIMARY CARE CLINIC | Facility: CLINIC | Age: 80
End: 2025-04-11
Payer: MEDICARE

## 2025-04-11 VITALS
RESPIRATION RATE: 18 BRPM | HEIGHT: 70 IN | OXYGEN SATURATION: 97 % | DIASTOLIC BLOOD PRESSURE: 62 MMHG | WEIGHT: 239 LBS | BODY MASS INDEX: 34.22 KG/M2 | TEMPERATURE: 98 F | SYSTOLIC BLOOD PRESSURE: 126 MMHG | HEART RATE: 76 BPM

## 2025-04-11 DIAGNOSIS — E78.5 TYPE 2 DIABETES MELLITUS WITH HYPERLIPIDEMIA: Primary | ICD-10-CM

## 2025-04-11 DIAGNOSIS — E11.69 TYPE 2 DIABETES MELLITUS WITH HYPERLIPIDEMIA: Primary | ICD-10-CM

## 2025-04-11 DIAGNOSIS — E78.1 HYPERTRIGLYCERIDEMIA: ICD-10-CM

## 2025-04-11 DIAGNOSIS — Z85.038 HISTORY OF COLON CANCER: ICD-10-CM

## 2025-04-11 DIAGNOSIS — E03.8 SUBCLINICAL HYPOTHYROIDISM: ICD-10-CM

## 2025-04-11 DIAGNOSIS — N18.31 STAGE 3A CHRONIC KIDNEY DISEASE: ICD-10-CM

## 2025-04-11 PROCEDURE — 99999 PR PBB SHADOW E&M-EST. PATIENT-LVL IV: CPT | Mod: PBBFAC,,, | Performed by: FAMILY MEDICINE

## 2025-04-11 RX ORDER — EZETIMIBE AND SIMVASTATIN 10; 40 MG/1; MG/1
1 TABLET ORAL NIGHTLY
Qty: 90 TABLET | Refills: 3 | Status: SHIPPED | OUTPATIENT
Start: 2025-04-11

## 2025-04-11 NOTE — PROGRESS NOTES
Assessment:       1. Type 2 diabetes mellitus with hyperlipidemia    2. Hypertriglyceridemia    3. Stage 3a chronic kidney disease    4. Subclinical hypothyroidism    5. History of colon cancer         Plan:       Type 2 diabetes mellitus with hyperlipidemia  -     empagliflozin (JARDIANCE) 10 mg tablet; Take 1 tablet (10 mg total) by mouth once daily.  Dispense: 30 tablet; Refill: 0  -     empagliflozin (JARDIANCE) 25 mg tablet; Take 1 tablet (25 mg total) by mouth once daily.  Dispense: 90 tablet; Refill: 1  -     Basic Metabolic Panel; Future; Expected date: 07/11/2025  -     Hemoglobin A1C; Future; Expected date: 07/11/2025    Hypertriglyceridemia  -     ezetimibe-simvastatin 10-40 mg (VYTORIN) 10-40 mg per tablet; Take 1 tablet by mouth every evening.  Dispense: 90 tablet; Refill: 3    Stage 3a chronic kidney disease  -     Basic Metabolic Panel; Future; Expected date: 07/11/2025    Subclinical hypothyroidism  -     TSH; Future; Expected date: 07/11/2025  -     T4, Free; Future; Expected date: 07/11/2025  -     T3; Future; Expected date: 07/11/2025    History of colon cancer      Assessment & Plan    - Reviewed A1C trend, noting increase from 5.9 to 7.0 over past 2 years.  - Determined Metformin alone insufficient for glycemic control.  - Switched from Metformin to Jardiance (SGLT2 inhibitor) due to rising A1C and mild kidney issues; considered Farxiga initially, but switched to Jardiance due to insurance coverage.  - Assessed thyroid function as stable, classifying subclinical hypothyroidism.  - Evaluated renal function, noting borderline stage 3a CKD but stable over years.  - Recommend continuation of colonoscopy screenings due to history of malignant colon tumor, despite age.  - Reviewed recent hip replacement, noting ongoing pain but no structural issues per Dr. Jiménez.    DIABETES MELLITUS TYPE 2:   Explained SGLT2 inhibitors work by blocking glucose reabsorption in kidneys, causing excess glucose to be  excreted in urine; clarified that presence of sugar in urine is expected with Jardiance due to its mechanism of action. Explained Jardiance has benefits for glucose control, weight loss, kidney protection, and heart failure management. Explained that fruits are high in sugar, while eggs have zero carbohydrates. Patient to check glucose 1-2 hours after eating Frosted Flakes to observe impact on glucose levels. Recommend reducing carbohydrate intake, particularly at breakfast; consider replacing cereal with eggs for breakfast. Patient to stay well-hydrated, especially when starting Jardiance. Discontinued Metformin and Diclofenac (use only as needed for arthritis pain). Started Jardiance 10 mg daily for 30 days, then increase to 25 mg daily after 30 days. Discussed potential side effects: increased urination, risk of UTIs and yeast infections (low risk for men). Ordered BMP (Basic Metabolic Panel), A1C, TSH, Free T4, and T3; all labs to be done at [facility] [[email or web address]] in 3 months.    CARPAL TUNNEL SYNDROME:   Discussed that B12 is important for peripheral nerve function, but may not significantly help carpal tunnel syndrome. Patient to continue using wrist braces at night for carpal tunnel syndrome management.    HYPERLIPIDEMIA:   Continued Vitorin.    COLONOSCOPY:   Remind about colonoscopy referral at next visit.    FOLLOW-UP:   Follow up in 4 months. Complete ordered labs at [facility] [[email or web address] a few days before the follow-up visit.       Medication List with Changes/Refills   New Medications    EMPAGLIFLOZIN (JARDIANCE) 10 MG TABLET    Take 1 tablet (10 mg total) by mouth once daily.    EMPAGLIFLOZIN (JARDIANCE) 25 MG TABLET    Take 1 tablet (25 mg total) by mouth once daily.   Current Medications    ONETOUCH DELICA PLUS LANCET 33 GAUGE MISC    1 lancet by Misc.(Non-Drug; Combo Route) route 2 (two) times daily.    ONETOUCH VERIO IQ METER MISC    1 kit by Misc.(Non-Drug; Combo Route) route 2 (two) times daily.    ONETOUCH VERIO TEST STRIPS  STRP    1 strip by Misc.(Non-Drug; Combo Route) route 2 (two) times daily.    TRAZODONE (DESYREL) 50 MG TABLET    Take 1-2 tablets ( mg total) by mouth nightly as needed for Insomnia.   Changed and/or Refilled Medications    Modified Medication Previous Medication    EZETIMIBE-SIMVASTATIN 10-40 MG (VYTORIN) 10-40 MG PER TABLET ezetimibe-simvastatin 10-40 mg (VYTORIN) 10-40 mg per tablet       Take 1 tablet by mouth every evening.    TAKE 1 TABLET EVERY EVENING   Discontinued Medications    METFORMIN (GLUCOPHAGE-XR) 500 MG ER 24HR TABLET    Take 1 tablet (500 mg total) by mouth daily with breakfast.         Subjective:    Patient ID: Nael Mistry is a 79 y.o. male.  Chief Complaint: Follow-up (6 month/reg check up) and Medication Refill    HPI  History of Present Illness    HPI:  Patient presents for a regular six-month checkup and to discuss recent labs results. Patient's A1C has been steadily increasing over the past 2 years, rising from 5.9 to the current level of 7.0. He has been taking metformin for diabetes management, but it appears to be ineffective in controlling blood sugar. He reports drinking Crystal Light, tea, water, and bourbon with Coke about once every 2 weeks. For breakfast, he typically consumes a spoon of Frosted Flakes cereal. He has been having tingling sensations in both hands, particularly at night, sometimes with pain severe enough to wake him and require shaking his hands for relief. He has been diagnosed with carpal tunnel syndrome and has been using wrist braces while sleeping, which has been helping to alleviate symptoms. He had a left hip replacement in November, which has been causing some pain issues. He was recently evaluated by the orthopedic surgeon who performed the procedure and was told that everything looks good with the hip, despite the ongoing pain.    He denies having any significant arthritis problems or taking Tylenol for pain relief.    MEDICATIONS:  Patient  is on Metformin for diabetes and Atorvastatin and Vytorin for cholesterol management. He is taking Trazodone for sleep. Patient was on Diclofenac, 1 tablet daily, for arthritis but has discontinued it, deciding to use it only as needed.    MEDICAL HISTORY:  Patient has a history of type 2 diabetes, carpal tunnel syndrome, diverticulosis in the sigmoid colon, a malignant tumor in the colon (removed), subclinical hypothyroidism, and stage 3a chronic kidney disease.    SURGICAL HISTORY:  Patient underwent a left hip replacement in November of last year, experiencing some issues with nerve block and pain. He also had a right hip replacement with a successful outcome, though the date is not specified. A colonoscopy 4-5 years ago revealed diverticulosis in the sigmoid colon. Patient had a small malignant tumor removed from his colon, which did not require further treatment. The date of this procedure is not specified.    TEST RESULTS:  Patient's A1C level have been monitored over time, with the most recent result at 7.0% during the current visit. Previous A1C readings include 6.8% six months ago, 5.9% in March 2023, and values of 6.1% and 6.6% at unspecified times. His TSH is currently high, noted to fluctuate around the upper end of the normal range, while T3 levels are consistently normal. A recent urinalysis showed no protein in the urine. Patient's PSA levels are good and consistent with levels from the past 5+ years. A recent lipid panel was good, and kidney function tests show stable, borderline stage 3a chronic kidney disease.    SOCIAL HISTORY:  Alcohol: Drinks bourbon and Coke maybe once every 2 weeks      ROS:  Musculoskeletal: +joint pain, +limb pain, +nightime pain, +body aches  Neurological: +tingling       Review of Systems    Objective:      Vitals:    04/11/25 0935   BP: 126/62   BP Location: Left arm   Patient Position: Sitting   Pulse: 76   Resp: 18   Temp: 97.7 °F (36.5 °C)   TempSrc: Temporal   SpO2:  "97%   Weight: 108.4 kg (238 lb 15.7 oz)   Height: 5' 10" (1.778 m)     BP Readings from Last 5 Encounters:   04/11/25 126/62   04/03/25 (!) 150/68   11/11/24 (!) 164/77   10/11/24 134/66   04/10/24 138/74     Wt Readings from Last 5 Encounters:   04/11/25 108.4 kg (238 lb 15.7 oz)   04/03/25 106.6 kg (235 lb 0.2 oz)   02/20/25 106.6 kg (235 lb)   12/19/24 106.1 kg (233 lb 14.5 oz)   11/25/24 106.1 kg (233 lb 14.5 oz)     Physical Exam  Physical Exam    General: Well-developed. Well-nourished. No acute distress.  Eyes: EOMI. Sclerae anicteric.  HENT: Normocephalic. Atraumatic. Nares patent. Moist oral mucosa.  Cardiovascular: Regular rate. Regular rhythm. No murmurs. No rubs. No gallops. Normal S1, S2.  Respiratory: Normal respiratory effort. Clear to auscultation bilaterally. No rales. No rhonchi. No wheezing.  Musculoskeletal: No  obvious deformity.  Extremities: No lower extremity edema.  Neurological: Alert & oriented x3. No slurred speech. Normal gait.  Psychiatric: Normal mood. Normal affect. Good insight. Good judgment.  Skin: Warm. Dry. No rash.         Lab Results   Component Value Date    WBC 5.6 04/01/2025    HGB 12.4 (L) 04/01/2025    HCT 37.5 (L) 04/01/2025     04/01/2025    CHOL 145 04/01/2025    TRIG 149 04/01/2025    HDL 45 04/01/2025    ALT 16 04/01/2025    AST 17 04/01/2025     04/01/2025    K 4.8 04/01/2025     04/01/2025    CREATININE 1.25 04/01/2025    BUN 20 04/01/2025    CO2 24 04/01/2025    TSH 6.86 (H) 04/01/2025    PSA 1.6 02/01/2019    HGBA1C 7.0 (H) 04/01/2025    MICROALBUR 0.2 04/01/2025      This note was generated with the assistance of ambient listening technology. Verbal consent was obtained by the patient and accompanying visitor(s) for the recording of patient appointment to facilitate this note. I attest to having reviewed and edited the generated note for accuracy, though some syntax or spelling errors may persist. Please contact the author of this note for any " clarification.

## 2025-08-05 ENCOUNTER — RESULTS FOLLOW-UP (OUTPATIENT)
Dept: PRIMARY CARE CLINIC | Facility: CLINIC | Age: 80
End: 2025-08-05
Payer: MEDICARE

## 2025-08-11 ENCOUNTER — TELEPHONE (OUTPATIENT)
Dept: PRIMARY CARE CLINIC | Facility: CLINIC | Age: 80
End: 2025-08-11

## 2025-08-11 ENCOUNTER — OFFICE VISIT (OUTPATIENT)
Dept: PRIMARY CARE CLINIC | Facility: CLINIC | Age: 80
End: 2025-08-11
Payer: MEDICARE

## 2025-08-11 VITALS
WEIGHT: 214.75 LBS | HEART RATE: 64 BPM | DIASTOLIC BLOOD PRESSURE: 70 MMHG | RESPIRATION RATE: 18 BRPM | TEMPERATURE: 98 F | HEIGHT: 70 IN | BODY MASS INDEX: 30.74 KG/M2 | SYSTOLIC BLOOD PRESSURE: 124 MMHG | OXYGEN SATURATION: 98 %

## 2025-08-11 DIAGNOSIS — G56.03 BILATERAL CARPAL TUNNEL SYNDROME: ICD-10-CM

## 2025-08-11 DIAGNOSIS — M13.0 POLYARTICULAR ARTHRITIS: ICD-10-CM

## 2025-08-11 DIAGNOSIS — F51.04 CHRONIC INSOMNIA: ICD-10-CM

## 2025-08-11 DIAGNOSIS — R06.09 EXERTIONAL DYSPNEA: ICD-10-CM

## 2025-08-11 DIAGNOSIS — E03.8 SUBCLINICAL HYPOTHYROIDISM: ICD-10-CM

## 2025-08-11 DIAGNOSIS — Z85.038 HISTORY OF COLON CANCER: ICD-10-CM

## 2025-08-11 DIAGNOSIS — N18.31 STAGE 3A CHRONIC KIDNEY DISEASE: ICD-10-CM

## 2025-08-11 DIAGNOSIS — E11.69 TYPE 2 DIABETES MELLITUS WITH HYPERLIPIDEMIA: Primary | ICD-10-CM

## 2025-08-11 DIAGNOSIS — E78.5 TYPE 2 DIABETES MELLITUS WITH HYPERLIPIDEMIA: Primary | ICD-10-CM

## 2025-08-11 DIAGNOSIS — E78.1 HYPERTRIGLYCERIDEMIA: ICD-10-CM

## 2025-08-11 PROCEDURE — 99214 OFFICE O/P EST MOD 30 MIN: CPT | Mod: S$GLB,,, | Performed by: FAMILY MEDICINE

## 2025-08-11 PROCEDURE — 1159F MED LIST DOCD IN RCRD: CPT | Mod: CPTII,S$GLB,, | Performed by: FAMILY MEDICINE

## 2025-08-11 PROCEDURE — 1160F RVW MEDS BY RX/DR IN RCRD: CPT | Mod: CPTII,S$GLB,, | Performed by: FAMILY MEDICINE

## 2025-08-11 PROCEDURE — 99999 PR PBB SHADOW E&M-EST. PATIENT-LVL IV: CPT | Mod: PBBFAC,,, | Performed by: FAMILY MEDICINE

## 2025-08-11 PROCEDURE — 1101F PT FALLS ASSESS-DOCD LE1/YR: CPT | Mod: CPTII,S$GLB,, | Performed by: FAMILY MEDICINE

## 2025-08-11 PROCEDURE — 3074F SYST BP LT 130 MM HG: CPT | Mod: CPTII,S$GLB,, | Performed by: FAMILY MEDICINE

## 2025-08-11 PROCEDURE — 3288F FALL RISK ASSESSMENT DOCD: CPT | Mod: CPTII,S$GLB,, | Performed by: FAMILY MEDICINE

## 2025-08-11 PROCEDURE — 3078F DIAST BP <80 MM HG: CPT | Mod: CPTII,S$GLB,, | Performed by: FAMILY MEDICINE

## 2025-08-11 PROCEDURE — 1126F AMNT PAIN NOTED NONE PRSNT: CPT | Mod: CPTII,S$GLB,, | Performed by: FAMILY MEDICINE

## 2025-08-11 RX ORDER — TRAMADOL HYDROCHLORIDE 50 MG/1
50 TABLET, FILM COATED ORAL EVERY 4 HOURS PRN
Qty: 40 TABLET | Refills: 0 | Status: SHIPPED | OUTPATIENT
Start: 2025-08-11

## 2025-08-11 RX ORDER — TRAZODONE HYDROCHLORIDE 50 MG/1
50-100 TABLET ORAL NIGHTLY PRN
Qty: 180 TABLET | Refills: 3 | Status: SHIPPED | OUTPATIENT
Start: 2025-08-11

## 2025-08-12 DIAGNOSIS — G56.03 BILATERAL CARPAL TUNNEL SYNDROME: ICD-10-CM

## (undated) DEVICE — TOWEL OR DISP STRL BLUE 4/PK

## (undated) DEVICE — SOL NACL IRR 1000ML BTL

## (undated) DEVICE — BNDG COFLEX FOAM LF2 ST 6X5YD

## (undated) DEVICE — DRAPE STERI INSTRUMENT 1018

## (undated) DEVICE — DRAPE INCISE IOBAN 2 23X33IN

## (undated) DEVICE — WRAP SHLDR HIP ACCU THRM PACK

## (undated) DEVICE — SEALER BIPOLAR TISSUE 6.0

## (undated) DEVICE — PIN STEINMANN TROCAR 7/64X9IN
Type: IMPLANTABLE DEVICE | Site: HIP | Status: NON-FUNCTIONAL
Removed: 2024-11-11

## (undated) DEVICE — PACK SIRUS BASIC V SURG STRL

## (undated) DEVICE — BLADE SAW SGTL NARROW 0.89

## (undated) DEVICE — GLOVE SENSICARE PI ALOE 8.5

## (undated) DEVICE — CLIPPER BLADE MOD 4406 (CAREF)

## (undated) DEVICE — SOCKINETTE IMPERVIOUS 12X48IN

## (undated) DEVICE — DRAPE HIP PCH 112X137X89IN

## (undated) DEVICE — COVER TABLE 44X90 STERILE

## (undated) DEVICE — MANIFOLD 4 PORT

## (undated) DEVICE — GLOVE SENSICARE PI ALOE 7.5

## (undated) DEVICE — DRAPE INVISISHIELD TOWEL SMALL

## (undated) DEVICE — SUT STRATAFIX PDS 1 CTX 18IN

## (undated) DEVICE — BLADE SURG CARBON STEEL #10

## (undated) DEVICE — SYS CLSR DERMABOND PRINEO 22CM

## (undated) DEVICE — SPONGE BULKEE II ABSRB 6X6.75

## (undated) DEVICE — DRAPE STERI U-SHAPED 47X51IN

## (undated) DEVICE — SUT MONOCRYL 3-0 PS-1

## (undated) DEVICE — INTERPULSE SET

## (undated) DEVICE — SOL NORMAL USPCA 0.9%

## (undated) DEVICE — SLEEVE SCD EXPRESS KNEE MEDIUM

## (undated) DEVICE — DRESSING GAUZE OIL EMUL 3X8

## (undated) DEVICE — SUT FIBERWIRE 2 38 IN TAPER

## (undated) DEVICE — PILLOW HIP ABDUCTION MED

## (undated) DEVICE — GOWN TOGA SYS PEELWY ZIP 2 XL

## (undated) DEVICE — YANKAUER FLEX NO VENT HI CAP

## (undated) DEVICE — ALCOHOL RUBBING 70% ISO 4OZ

## (undated) DEVICE — GLOVE SURG ULTRA TOUCH 8.5

## (undated) DEVICE — SUT STRATAFIX SPIRAL VIOLET

## (undated) DEVICE — PAD ABDOMINAL STERILE 8X10IN

## (undated) DEVICE — GLOVE SENSICARE PI ALOE 7

## (undated) DEVICE — COVER SURG LIGHT HANDLE

## (undated) DEVICE — DRAPE INCISE IOBAN 2 23X17IN

## (undated) DEVICE — DRAPE U SPLIT SHEET 54X76IN

## (undated) DEVICE — ELECTRODE REM PLYHSV RETURN 9

## (undated) DEVICE — UNGERGLOVE BIOGEL PI INDIC SZ9

## (undated) DEVICE — SOL NACL 0.9% IV INJ 250ML

## (undated) DEVICE — DRAPE ORTH SPLIT 77X108IN

## (undated) DEVICE — APPLICATOR CHLORAPREP ORN 26ML

## (undated) DEVICE — SYS CLSR DERMABOND PRINEO 42CM

## (undated) DEVICE — TOGA FLYTE PEEL AWAY XLARGE

## (undated) DEVICE — ALCOHOL 70% ANTISEPTIC ISO 4OZ

## (undated) DEVICE — GLOVE SENSICARE PI GRN 7

## (undated) DEVICE — DRAPE THREE-QTR REINF 53X77IN

## (undated) DEVICE — SOL NACL IRR 3000ML

## (undated) DEVICE — ELECTRODE BLADE TEFLON 6

## (undated) DEVICE — GLOVE SENSICARE PI GRN 8.5

## (undated) DEVICE — PACK CUSTOM UNIV BASIN SLI

## (undated) DEVICE — GLOVE SENSICARE PI ALOE 6.5

## (undated) DEVICE — STRAP OR TABLE 5IN X 72IN

## (undated) DEVICE — SOL NACL 0.9% INJ 250ML BG

## (undated) DEVICE — STRIP MEDI WND CLSR 1/2X4IN

## (undated) DEVICE — SPONGE LAP 18X18 PREWASHED

## (undated) DEVICE — SYS SURGIPHOR STRL IRRG